# Patient Record
Sex: FEMALE | Race: ASIAN | Employment: FULL TIME | URBAN - METROPOLITAN AREA
[De-identification: names, ages, dates, MRNs, and addresses within clinical notes are randomized per-mention and may not be internally consistent; named-entity substitution may affect disease eponyms.]

---

## 2017-12-19 ENCOUNTER — ALLSCRIPTS OFFICE VISIT (OUTPATIENT)
Dept: OTHER | Facility: OTHER | Age: 22
End: 2017-12-19

## 2017-12-20 NOTE — PROGRESS NOTES
Assessment  1  Right ovarian cyst (620 2) (N83 201)   2  Acute pelvic pain, female (625 9) (R10 2)    Plan  Acute pelvic pain, female, Right ovarian cyst    · Follow-up visit in 2 weeks Evaluation and Treatment  Follow-up  Status: Hold For -Scheduling  Requested for: [id number]   Ordered; For: Acute pelvic pain, female, Right ovarian cyst; Ordered By: Milton Cabrales Performed:  Due: [id number]    Discussion/Summary  Discussion Summary:   Await results from [facility] and will call patient with recommendations  Goals and Barriers: The patient has the current Goals: Resolution of pain  The patent has the current Barriers: None  Patient's Capacity to Self-Care: Patient is able to Self-Care  Chief Complaint  Chief Complaint Free Text Note Form: I am following up from the hospital      History of Present Illness  HPI: Pt is a 25 y o G0 with LMP 12/18/2017 who presents for follow up to the ED  SHe reports she went to the ED On 12/14/2017 for right sided pain and she was told she had an ovarian cyst  She reports she was told it was large, but does not know the size  She was given tramadol for pain nd naproxen, but she only is taking naproxen  In the ED her pain was 8/10 and now it is 4/10  She denies urinary complaints  SHe denies abnormal bleeding and reports regular menses  She reports she is sexually active with her   She has been  x 2 years  She denies abnormal vaginal discharge  She does not bring any medical records with her  Review of Systems  Focused-Female:  Constitutional: No fever, no chills, feels well, no tiredness, no recent weight gain or loss  ENT: no ear ache, no loss of hearing, no nosebleeds or nasal discharge, no sore throat or hoarseness  Cardiovascular: no complaints of slow or fast heart rate, no chest pain, no palpitations, no leg claudication or lower extremity edema  Respiratory: no complaints of shortness of breath, no wheezing, no dyspnea on exertion, no orthopnea or PND  Breasts: no complaints of breast pain, breast lump or nipple discharge  Gastrointestinal: no complaints of abdominal pain, no constipation, no nausea or diarrhea, no vomiting, no bloody stools  Genitourinary: as noted in HPI  Musculoskeletal: no complaints of arthralgia, no myalgia, no joint swelling or stiffness, no limb pain or swelling  Integumentary: no complaints of skin rash or lesion, no itching or dry skin, no skin wounds  Neurological: no complaints of headache, no confusion, no numbness or tingling, no dizziness or fainting  Active Problems  1  Acute pelvic pain, female (625 9) (R10 2)   2  Right ovarian cyst (620 2) (N83 201)    Past Medical History  1  History of chickenpox vaccination (V49 89) (Z92 29)   2  Denied: History of herpes simplex infection   3  Denied: History of pregnancy   4  History of sexually transmitted disease (V12 09) (Z86 19)   5  History of Menarche (V21 8)    Surgical History  1  History of Nasal Septal Deviation Repair    Family History  Mother    1  Family history of hypercholesterolemia (V18 19) (Z83 42)   2  Family history of ischemic heart disease (V17 3) (Z82 49)  Father    3  Family history of    4  Family history of ischemic heart disease (V17 3) (Z82 49)  Family History    5  Denied: Family history of breast cancer   6  Denied: Family history of colon cancer   7  Denied: FHx: ovarian cancer    Social History   · College student   · [employer] [employer] [place]   · Denied: History of drug use   · 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)   · Housewife or homemaker   ·    · Never a smoker   · Occasional alcohol use   · Sedentary lifestyle (V15 89) (Z91 89)    Current Meds   1  Naproxen 500 MG Oral Tablet; Therapy: (Recorded:[id number]) to Recorded    Allergies  1   No Known Drug Allergies    Vitals  Vital Signs    Recorded: 07CRA1745 19:58SC   Systolic 824   Diastolic 70   Height 5 ft 5 75 in   Weight 196 lb 0 4 oz   BMI Calculated 31 88   BSA Calculated 1 98 LMP [id number]     Physical Exam   Constitutional  General appearance: Abnormal   obese  Neck  Neck: Normal, supple, trachea midline, no masses  Pulmonary  Respiratory effort: No increased work of breathing or signs of respiratory distress  Auscultation of lungs: Clear to auscultation  Cardiovascular  Auscultation of heart: Normal rate and rhythm, normal S1 and S2, no murmurs  Peripheral vascular exam: Normal pulses Throughout  Genitourinary  External genitalia: Normal and no lesions appreciated  Vagina: Normal, no lesions or dryness appreciated  Urethra: Normal    Urethral meatus: Normal    Bladder: Normal, soft, non-tender and no prolapse or masses appreciated  Cervix: Normal, no palpable masses  Examination of the cervix revealed normal findings,-- a nulliparous cervix,-- no polyps-- and-- no masses  Bimanual exam findings include no cervical motion tenderness  Uterus: Normal, non-tender, not enlarged, and no palpable masses  Adnexa/parametria: Abnormal   Adnexa Findings: tenderness on the right-- and-- a 5-6 cm right mass, but-- normal right adnexa,-- normal left adnexa,-- nontender on the left-- and-- no masses on the left  Abdomen  Abdomen: Normal, non-tender, and no organomegaly noted  Liver and spleen: No hepatomegaly or splenomegaly  Examination for hernias: No hernias appreciated  Skin  Skin and subcutaneous tissue: Normal skin turgor and no rashes     Psychiatric  Orientation to person, place, and time: Normal    Mood and affect: Normal        Signatures   Electronically signed by : TORI Martinez ; Dec 19 2017 11:59AM EST                       (Author)

## 2018-01-22 ENCOUNTER — ALLSCRIPTS OFFICE VISIT (OUTPATIENT)
Dept: OTHER | Facility: OTHER | Age: 23
End: 2018-01-22

## 2018-01-23 VITALS
SYSTOLIC BLOOD PRESSURE: 120 MMHG | DIASTOLIC BLOOD PRESSURE: 70 MMHG | BODY MASS INDEX: 31.51 KG/M2 | WEIGHT: 196.03 LBS | HEIGHT: 66 IN

## 2018-01-23 NOTE — PROGRESS NOTES
Assessment   1  Right ovarian cyst (620 2) (N83 201)    Plan   Right ovarian cyst    · * US PELVIS COMPLETE (TRANSABDOMINAL AND TRANSVAGINAL); Status:Hold For -    Scheduling; Requested for:[id number]; Perform:[facility]; Order Comments:previous right ovarian cyst 12/15/2017 9 9 x 7 7 x 5 5 cmplease re-evaluate; XZX:[id number];DXVIGBP; For:Right ovarian cyst; Ordered By:Christy Valera;    Discussion/Summary   Goals and Barriers: The patient has the current Goals: As per HPI  The patent has the current Barriers: None  Patient's Capacity to Self-Care: Patient is able to Self-Care  Chief Complaint   Chief Complaint Free Text Note Form: discuss cyst management      History of Present Illness   HPI: Pt is a 25 y o  G0 with LMP 1/12/2018 who presents to discuss management of her ovarian cyst  I reviewed with the patient that I received her records from [facility] and as we had discussed on the phone her right ovarian cyst measures 9 5 x 7 7 x 5 5 cm  At that time we reviewed the risk of ovarian torsion and I recommended surgical management  The pt reports she has discussed things with her family and  and she would like more information about alternative options  SHe reports she has minimal pain as compared to prior  She reports sexual activity is not painful at all  She also reports she is a full time student and does not want to miss school at this time  We reviewed the technique of laparoscopy ovarian cystectomy and the risks of bleeding, infection, damage to adjacent organs, possible loss of the ovary, dvt/pe and death  We also reviewed the risks of ovarian torsion and need for emergent surgery with possible loss of ovary  The pt would like to be as conservative as possible  She reports she understands the risks and would like to repeat the ultrasound  Pt reports that if the ultrasound shows the cyst is similar in size she will undergo the surgery during spring break   iF it is smaller she would prefer conservative management  Review of Systems   Focused-Female:      Constitutional: No fever, no chills, feels well, no tiredness, no recent weight gain or loss  ENT: no ear ache, no loss of hearing, no nosebleeds or nasal discharge, no sore throat or hoarseness  Cardiovascular: no complaints of slow or fast heart rate, no chest pain, no palpitations, no leg claudication or lower extremity edema  Respiratory: no complaints of shortness of breath, no wheezing, no dyspnea on exertion, no orthopnea or PND  Breasts: no complaints of breast pain, breast lump or nipple discharge  Gastrointestinal: no complaints of abdominal pain, no constipation, no nausea or diarrhea, no vomiting, no bloody stools  Genitourinary: as noted in HPI  Musculoskeletal: no complaints of arthralgia, no myalgia, no joint swelling or stiffness, no limb pain or swelling  Integumentary: no complaints of skin rash or lesion, no itching or dry skin, no skin wounds  Neurological: no complaints of headache, no confusion, no numbness or tingling, no dizziness or fainting  Active Problems   1  Acute pelvic pain, female (625 9) (R10 2)   2  Right ovarian cyst (620 2) (N83 201)    Past Medical History   1  History of chickenpox vaccination (V49 89) (Z92 29)   2  Denied: History of herpes simplex infection   3  Denied: History of pregnancy   4  History of sexually transmitted disease (V12 09) (Z86 19)   5  History of Menarche (V21 8)  Active Problems And Past Medical History Reviewed: The active problems and past medical history were reviewed and updated today  Surgical History   1  History of Nasal Septal Deviation Repair  Surgical History Reviewed: The surgical history was reviewed and updated today  Family History   Mother    1  Family history of hypercholesterolemia (V18 19) (Z83 42)   2  Family history of ischemic heart disease (V17 3) (Z82 49)  Father    3   Family history of    4  Family history of ischemic heart disease (V17 3) (Z82 49)  Family History    5  Denied: Family history of breast cancer   6  Denied: Family history of colon cancer   7  Denied: FHx: ovarian cancer  Family History Reviewed: The family history was reviewed and updated today  Social History    · College student   · [employer] [employer] [place]   · Denied: History of drug use   · 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)   · Housewife or homemaker   ·    · Never a smoker   · Occasional alcohol use   · Sedentary lifestyle (V15 89) (Z91 89)  Social History Reviewed: The social history was reviewed and updated today  The social history was reviewed and is unchanged  Current Meds    1  Naproxen 500 MG Oral Tablet; Therapy: (Recorded:01Vhh9723) to Recorded  Medication List Reviewed: The medication list was reviewed and updated today  Allergies   1  No Known Drug Allergies    Vitals   Vital Signs    Recorded: 05LTY8942 45:92MA   Systolic 283, LUE, Sitting   Diastolic 70, LUE, Sitting   Height 5 ft 5 75 in   Weight 197 lb    BMI Calculated 32 04   BSA Calculated 1 98   LMP [id number]     Physical Exam        Constitutional      General appearance: Abnormal   obese  Neck      Neck: Normal, supple, trachea midline, no masses  Pulmonary      Respiratory effort: No increased work of breathing or signs of respiratory distress  Cardiovascular      Auscultation of heart: Normal rate and rhythm, normal S1 and S2, no murmurs  Genitourinary      External genitalia: Normal and no lesions appreciated  Vagina: Normal, no lesions or dryness appreciated  Urethra: Normal        Urethral meatus: Normal        Bladder: Normal, soft, non-tender and no prolapse or masses appreciated  Cervix: Normal, no palpable masses  Examination of the cervix revealed normal findings,-- a nulliparous cervix,-- no polyps-- and-- no masses   Bimanual exam findings include no cervical motion tenderness  Uterus: Normal, non-tender, not enlarged, and no palpable masses  Adnexa/parametria: Abnormal   Adnexa Findings: a 3-4 cm right mass, but-- normal right adnexa,-- normal left adnexa,-- nontender on the right,-- nontender on the left-- and-- no masses on the left  Abdomen      Abdomen: Normal, non-tender, and no organomegaly noted  Liver and spleen: No hepatomegaly or splenomegaly  Examination for hernias: No hernias appreciated  Skin      Skin and subcutaneous tissue: Normal skin turgor and no rashes         Psychiatric      Orientation to person, place, and time: Normal        Mood and affect: Normal        Signatures    Electronically signed by : TORI Araujo ; Jan 22 2018  4:22PM EST                       (Author)

## 2018-02-02 DIAGNOSIS — N83.201 CYST OF RIGHT OVARY: ICD-10-CM

## 2018-02-03 ENCOUNTER — HOSPITAL ENCOUNTER (OUTPATIENT)
Dept: ULTRASOUND IMAGING | Facility: HOSPITAL | Age: 23
Discharge: HOME | End: 2018-02-03
Attending: OBSTETRICS & GYNECOLOGY
Payer: COMMERCIAL

## 2018-02-03 DIAGNOSIS — N83.201 CYST OF RIGHT OVARY: ICD-10-CM

## 2018-02-03 PROCEDURE — 76830 TRANSVAGINAL US NON-OB: CPT

## 2018-02-03 PROCEDURE — 76856 US EXAM PELVIC COMPLETE: CPT

## 2018-02-13 ENCOUNTER — TELEPHONE (OUTPATIENT)
Dept: OBGYN CLINIC | Facility: CLINIC | Age: 23
End: 2018-02-13

## 2018-02-13 ENCOUNTER — OFFICE VISIT (OUTPATIENT)
Dept: OBGYN CLINIC | Facility: CLINIC | Age: 23
End: 2018-02-13
Payer: COMMERCIAL

## 2018-02-13 VITALS
HEIGHT: 65 IN | DIASTOLIC BLOOD PRESSURE: 70 MMHG | SYSTOLIC BLOOD PRESSURE: 102 MMHG | BODY MASS INDEX: 32.15 KG/M2 | WEIGHT: 193 LBS

## 2018-02-13 DIAGNOSIS — N83.201 RIGHT OVARIAN CYST: Primary | ICD-10-CM

## 2018-02-13 PROCEDURE — 99214 OFFICE O/P EST MOD 30 MIN: CPT | Performed by: OBSTETRICS & GYNECOLOGY

## 2018-02-13 RX ORDER — SODIUM CHLORIDE 9 MG/ML
125 INJECTION, SOLUTION INTRAVENOUS CONTINUOUS
Status: CANCELLED | OUTPATIENT
Start: 2018-02-13

## 2018-02-14 NOTE — PATIENT INSTRUCTIONS
Dr Emerita Wang surgery  [facility]                                                         Diagnostic surgery  [address]                                                                                           Tubal ligation  [place], [place]  [address]                                                                        Salpingectomy  [phone]                                                    Post-operative Instructions---Laparoscopic Surgery      Things to call for:         temperature of 100 4*F or more                                      worsening pain                                       foul smelling discharge                                      heavy vaginal bleeding                                      persistent nausea or vomiting                                      redness, tenderness, discharge at the incision(s)    You may experience: vaginal spotting for several days                                     mild nausea related to the anesthetic and which should steadily                                            improve                                     abdominal bloating for about two weeks                                     moderate pain for the first day or two followed by steady                                                        improvement    You may:                   return to work in 1-7 days (depending on the nature of your work                                         and the procedure performed)                                    shower the day following the procedure                                    eat as you normally would                                                                    do light housework (daily household requirements but please                                                 let the vacuuming wait at least two weeks) resume usual activities in two weeks                                    please avoid significant straining for at least two weeks      Please do not:           have sexual relations for at least four weeks                                      place anything in the vagina until two weeks after the procedure                                        lift or push heavy items (> 20 lbs ) until four weeks after the                                                   procedure

## 2018-02-14 NOTE — PROGRESS NOTES
HPI:  Pt is a 25 y o  Rayshawn Emeli with Patient's last menstrual period was 2018 (exact date)  using NFP for contraception presents c/o ovarian cyst, enlarging and persisting  The patient reports that after discussion with her  after we reviewed results of her last ultrasound, that even though her pelvic pain has resolved, she has decided to proceed with surgical management of her right ovarian cyst      PMHx:   Past Medical History:   Diagnosis Date    Chicken pox        PSHx:   Past Surgical History:   Procedure Laterality Date    NOSE SURGERY      Nasal Septum deviation repair       Meds:   (Not in a hospital admission)    Allergies: No Known Allergies    Social Hx:    Social History     Social History    Marital status: /Civil Union     Spouse name: N/A    Number of children: N/A    Years of education: college student     Occupational History    student      Social History Main Topics    Smoking status: Never Smoker    Smokeless tobacco: Never Used    Alcohol use Yes      Comment: occasional    Drug use: No    Sexual activity: Yes     Partners: Male     Birth control/ protection: Rhythm     Other Topics Concern    None     Social History Narrative    College student    [employer] [place]    Hookah pipe smoker    [place] lifestyle       Ob Hx:   OB History    Para Term  AB Living   0 0 0 0 0 0   SAB TAB Ectopic Multiple Live Births   0 0 0 0 0             Gyn HX:  denies STD, abnormal pap, significant dysmenorrhea or irregular menses    Fm Hx:   Family History   Problem Relation Age of Onset    Hyperlipidemia Mother     Heart disease Mother     Heart attack Father 36    Heart disease Father     Gopi's disease Maternal Grandmother     Breast cancer Neg Hx     Ovarian cancer Neg Hx     Colon cancer Neg Hx        ROS:  Review of Systems   Constitutional: Negative for chills, fatigue, fever and unexpected weight change     HENT: Negative for congestion, mouth sores and sore throat  Respiratory: Negative for cough, chest tightness, shortness of breath and wheezing  Cardiovascular: Negative for chest pain and palpitations  Gastrointestinal: Negative for abdominal distention, abdominal pain, constipation, diarrhea, nausea and vomiting  Endocrine: Negative for cold intolerance and heat intolerance  Genitourinary: Negative for dyspareunia, dysuria, genital sores, menstrual problem, pelvic pain, vaginal bleeding, vaginal discharge and vaginal pain  Musculoskeletal: Negative for arthralgias  Skin: Negative for color change and rash  Neurological: Negative for dizziness, light-headedness and headaches  Hematological: Negative for adenopathy  VS:  /70 (BP Location: Left arm, Patient Position: Sitting, Cuff Size: Standard)   Ht 5' 5 24" (1 657 m)   Wt 87 5 kg (193 lb)   LMP 02/09/2018 (Exact Date)   Breastfeeding? No   BMI 31 88 kg/m²       Exam:    Physical Exam   Constitutional: She is oriented to person, place, and time  She appears well-developed and well-nourished  HENT:   Head: Normocephalic and atraumatic  Eyes: Conjunctivae and EOM are normal    Neck: Normal range of motion  Cardiovascular: Normal rate, regular rhythm and normal heart sounds  Pulmonary/Chest: Effort normal and breath sounds normal    Abdominal: Soft  Bowel sounds are normal  She exhibits no distension  There is no tenderness  There is no rebound and no guarding  Musculoskeletal: Normal range of motion  Neurological: She is alert and oriented to person, place, and time  Skin: Skin is warm  Psychiatric: She has a normal mood and affect   Her behavior is normal  Judgment and thought content normal        abd        soft, NT, ND, no palpable masses or organomegally           vulva      normal external genitalia for age and no lesions, masses, epithelial changes, or exudate    vagina    color pink, rugae  well formed rugae and discharge  absent    cervix nullip, no lesions  and no cervical motion tenderness    uterus     NSSC, AF, NT, mobile and 6    adnexa   Right ovary with 4 cm mass palpated, nontender, mobile  left ovary normal size, free of cysts or masses  RV        deferred    Labs:  No results found for: WBC, HGB, HCT, PLT  No results found for: PREGTESTUR, PREGSERUM, HCG, HCGQUANT    Imagin/3/2018  UTERUS:  The uterus is normal in position, measuring 6 9 x 3 6 x 6 2 cm  Contour and echotexture appear normal   The cervix shows no suspicious abnormality      ENDOMETRIUM:    Normal caliber of 7 mm  Homogenous and normal in appearance      OVARIES/ADNEXA:  Right ovary:  10 8 x 6 6 x 6 9 cm  The right ovary is occupied by a large cyst   There are no septations  A small soft tissue nodule arises from the right, posterior wall of this cyst, this nodule measures approximately 8 mm in diameter  Doppler flow within normal limits      Left ovary:  3 6 x 2 1 x 2 6 cm  No suspicious left ovarian abnormality  Doppler flow within normal limits      No suspicious adnexal mass or loculated collections  There is trace free fluid      IMPRESSION:     The right ovary is replaced by a large primarily simple cyst which contains a small wall nodule  Surgical consultation recommended  A/P: 25 y o  Toney Hamilton with Patient's last menstrual period was 2018 (exact date)  with ovarian cyst, enlarging and persisting  for laparoscopic right ovarian cystetomy, possible right oophorectomy   The risks (infection, bleeding, transfusion, damage to adjacent organs requiring immediate and/or delayed repair, clots inside blood vessels, need to complete procedure using alternative approach, procedural failure, worsening of pre-exisiting medical condition, and death) and alternatives (see outpatient record) have been discussed and patient desires to proceed with laparoscopic right ovarian cystectomy, possible right oophrectomy at [facility] on 3/1/2018

## 2018-02-28 ENCOUNTER — ANESTHESIA EVENT (OUTPATIENT)
Dept: PERIOP | Facility: HOSPITAL | Age: 23
End: 2018-02-28
Payer: COMMERCIAL

## 2018-03-01 ENCOUNTER — ANESTHESIA (OUTPATIENT)
Dept: PERIOP | Facility: HOSPITAL | Age: 23
End: 2018-03-01
Payer: COMMERCIAL

## 2018-03-01 ENCOUNTER — HOSPITAL ENCOUNTER (OUTPATIENT)
Facility: HOSPITAL | Age: 23
Setting detail: OUTPATIENT SURGERY
Discharge: HOME | End: 2018-03-01
Attending: OBSTETRICS & GYNECOLOGY | Admitting: OBSTETRICS & GYNECOLOGY
Payer: COMMERCIAL

## 2018-03-01 VITALS
OXYGEN SATURATION: 98 % | HEIGHT: 67 IN | RESPIRATION RATE: 20 BRPM | WEIGHT: 192.9 LBS | SYSTOLIC BLOOD PRESSURE: 116 MMHG | DIASTOLIC BLOOD PRESSURE: 62 MMHG | HEART RATE: 84 BPM | BODY MASS INDEX: 30.28 KG/M2 | TEMPERATURE: 98.5 F

## 2018-03-01 DIAGNOSIS — N83.201 RIGHT OVARIAN CYST: ICD-10-CM

## 2018-03-01 DIAGNOSIS — G89.18 POST-OPERATIVE PAIN: Primary | ICD-10-CM

## 2018-03-01 LAB — EXT PREGNANCY TEST URINE: NEGATIVE

## 2018-03-01 PROCEDURE — 88112 CYTOPATH CELL ENHANCE TECH: CPT | Performed by: OBSTETRICS & GYNECOLOGY

## 2018-03-01 PROCEDURE — 58662 LAPAROSCOPY EXCISE LESIONS: CPT | Performed by: OBSTETRICS & GYNECOLOGY

## 2018-03-01 PROCEDURE — 88305 TISSUE EXAM BY PATHOLOGIST: CPT | Performed by: PATHOLOGY

## 2018-03-01 PROCEDURE — C1765 ADHESION BARRIER: HCPCS | Performed by: OBSTETRICS & GYNECOLOGY

## 2018-03-01 PROCEDURE — 88305 TISSUE EXAM BY PATHOLOGIST: CPT | Performed by: OBSTETRICS & GYNECOLOGY

## 2018-03-01 PROCEDURE — 81025 URINE PREGNANCY TEST: CPT | Performed by: OBSTETRICS & GYNECOLOGY

## 2018-03-01 PROCEDURE — 88304 TISSUE EXAM BY PATHOLOGIST: CPT | Performed by: PATHOLOGY

## 2018-03-01 PROCEDURE — 88112 CYTOPATH CELL ENHANCE TECH: CPT | Performed by: PATHOLOGY

## 2018-03-01 PROCEDURE — 88304 TISSUE EXAM BY PATHOLOGIST: CPT | Performed by: OBSTETRICS & GYNECOLOGY

## 2018-03-01 RX ORDER — ONDANSETRON 2 MG/ML
INJECTION INTRAMUSCULAR; INTRAVENOUS AS NEEDED
Status: DISCONTINUED | OUTPATIENT
Start: 2018-03-01 | End: 2018-03-01 | Stop reason: SURG

## 2018-03-01 RX ORDER — OXYCODONE HYDROCHLORIDE 5 MG/1
10 TABLET ORAL EVERY 4 HOURS PRN
Status: DISCONTINUED | OUTPATIENT
Start: 2018-03-01 | End: 2018-03-02 | Stop reason: HOSPADM

## 2018-03-01 RX ORDER — PROPOFOL 10 MG/ML
INJECTION, EMULSION INTRAVENOUS AS NEEDED
Status: DISCONTINUED | OUTPATIENT
Start: 2018-03-01 | End: 2018-03-01 | Stop reason: SURG

## 2018-03-01 RX ORDER — ONDANSETRON 2 MG/ML
4 INJECTION INTRAMUSCULAR; INTRAVENOUS ONCE AS NEEDED
Status: DISCONTINUED | OUTPATIENT
Start: 2018-03-01 | End: 2018-03-01 | Stop reason: HOSPADM

## 2018-03-01 RX ORDER — MIDAZOLAM HYDROCHLORIDE 1 MG/ML
INJECTION INTRAMUSCULAR; INTRAVENOUS AS NEEDED
Status: DISCONTINUED | OUTPATIENT
Start: 2018-03-01 | End: 2018-03-01 | Stop reason: SURG

## 2018-03-01 RX ORDER — BUPIVACAINE HYDROCHLORIDE 2.5 MG/ML
INJECTION, SOLUTION INFILTRATION; PERINEURAL AS NEEDED
Status: DISCONTINUED | OUTPATIENT
Start: 2018-03-01 | End: 2018-03-01 | Stop reason: HOSPADM

## 2018-03-01 RX ORDER — MAGNESIUM HYDROXIDE 1200 MG/15ML
LIQUID ORAL AS NEEDED
Status: DISCONTINUED | OUTPATIENT
Start: 2018-03-01 | End: 2018-03-01 | Stop reason: HOSPADM

## 2018-03-01 RX ORDER — KETOROLAC TROMETHAMINE 30 MG/ML
INJECTION, SOLUTION INTRAMUSCULAR; INTRAVENOUS AS NEEDED
Status: DISCONTINUED | OUTPATIENT
Start: 2018-03-01 | End: 2018-03-01 | Stop reason: SURG

## 2018-03-01 RX ORDER — OXYCODONE HYDROCHLORIDE AND ACETAMINOPHEN 5; 325 MG/1; MG/1
1 TABLET ORAL EVERY 4 HOURS PRN
Status: DISCONTINUED | OUTPATIENT
Start: 2018-03-01 | End: 2018-03-02 | Stop reason: HOSPADM

## 2018-03-01 RX ORDER — IBUPROFEN 600 MG/1
600 TABLET ORAL EVERY 6 HOURS PRN
Status: DISCONTINUED | OUTPATIENT
Start: 2018-03-01 | End: 2018-03-02 | Stop reason: HOSPADM

## 2018-03-01 RX ORDER — ROCURONIUM BROMIDE 10 MG/ML
INJECTION, SOLUTION INTRAVENOUS AS NEEDED
Status: DISCONTINUED | OUTPATIENT
Start: 2018-03-01 | End: 2018-03-01 | Stop reason: SURG

## 2018-03-01 RX ORDER — FENTANYL CITRATE/PF 50 MCG/ML
25 SYRINGE (ML) INJECTION
Status: DISCONTINUED | OUTPATIENT
Start: 2018-03-01 | End: 2018-03-01 | Stop reason: HOSPADM

## 2018-03-01 RX ORDER — IBUPROFEN 600 MG/1
600 TABLET ORAL EVERY 6 HOURS PRN
Qty: 30 TABLET | Refills: 0 | Status: SHIPPED | OUTPATIENT
Start: 2018-03-01 | End: 2018-03-08

## 2018-03-01 RX ORDER — OXYCODONE HYDROCHLORIDE AND ACETAMINOPHEN 5; 325 MG/1; MG/1
1 TABLET ORAL EVERY 4 HOURS PRN
Qty: 15 TABLET | Refills: 0 | Status: SHIPPED | OUTPATIENT
Start: 2018-03-01 | End: 2018-03-08

## 2018-03-01 RX ORDER — SODIUM CHLORIDE 9 MG/ML
125 INJECTION, SOLUTION INTRAVENOUS CONTINUOUS
Status: DISCONTINUED | OUTPATIENT
Start: 2018-03-01 | End: 2018-03-02 | Stop reason: HOSPADM

## 2018-03-01 RX ORDER — ONDANSETRON 2 MG/ML
4 INJECTION INTRAMUSCULAR; INTRAVENOUS EVERY 6 HOURS PRN
Status: DISCONTINUED | OUTPATIENT
Start: 2018-03-01 | End: 2018-03-02 | Stop reason: HOSPADM

## 2018-03-01 RX ORDER — FENTANYL CITRATE 50 UG/ML
INJECTION, SOLUTION INTRAMUSCULAR; INTRAVENOUS AS NEEDED
Status: DISCONTINUED | OUTPATIENT
Start: 2018-03-01 | End: 2018-03-01 | Stop reason: SURG

## 2018-03-01 RX ORDER — GLYCOPYRROLATE 0.2 MG/ML
INJECTION INTRAMUSCULAR; INTRAVENOUS AS NEEDED
Status: DISCONTINUED | OUTPATIENT
Start: 2018-03-01 | End: 2018-03-01 | Stop reason: SURG

## 2018-03-01 RX ADMIN — ROCURONIUM BROMIDE 30 MG: 10 INJECTION INTRAVENOUS at 14:12

## 2018-03-01 RX ADMIN — DEXAMETHASONE SODIUM PHOSPHATE 4 MG: 10 INJECTION INTRAMUSCULAR; INTRAVENOUS at 14:34

## 2018-03-01 RX ADMIN — MIDAZOLAM HYDROCHLORIDE 2 MG: 1 INJECTION, SOLUTION INTRAMUSCULAR; INTRAVENOUS at 14:02

## 2018-03-01 RX ADMIN — FENTANYL CITRATE 100 MCG: 50 INJECTION, SOLUTION INTRAMUSCULAR; INTRAVENOUS at 14:12

## 2018-03-01 RX ADMIN — ROCURONIUM BROMIDE 10 MG: 10 INJECTION INTRAVENOUS at 14:45

## 2018-03-01 RX ADMIN — PROPOFOL 200 MG: 10 INJECTION, EMULSION INTRAVENOUS at 14:12

## 2018-03-01 RX ADMIN — IBUPROFEN 600 MG: 600 TABLET, FILM COATED ORAL at 18:45

## 2018-03-01 RX ADMIN — KETOROLAC TROMETHAMINE 30 MG: 30 INJECTION, SOLUTION INTRAMUSCULAR at 16:07

## 2018-03-01 RX ADMIN — GLYCOPYRROLATE 0.4 MG: 0.2 INJECTION, SOLUTION INTRAMUSCULAR; INTRAVENOUS at 16:27

## 2018-03-01 RX ADMIN — FENTANYL CITRATE 50 MCG: 50 INJECTION, SOLUTION INTRAMUSCULAR; INTRAVENOUS at 15:39

## 2018-03-01 RX ADMIN — ONDANSETRON HYDROCHLORIDE 4 MG: 2 INJECTION, SOLUTION INTRAVENOUS at 14:34

## 2018-03-01 RX ADMIN — NEOSTIGMINE METHYLSULFATE 2.5 MG: 1 INJECTION, SOLUTION INTRAMUSCULAR; INTRAVENOUS; SUBCUTANEOUS at 16:27

## 2018-03-01 RX ADMIN — ONDANSETRON HYDROCHLORIDE 4 MG: 2 INJECTION, SOLUTION INTRAVENOUS at 15:41

## 2018-03-01 RX ADMIN — SODIUM CHLORIDE 125 ML/HR: 0.9 INJECTION, SOLUTION INTRAVENOUS at 13:35

## 2018-03-01 RX ADMIN — FENTANYL CITRATE 50 MCG: 50 INJECTION, SOLUTION INTRAMUSCULAR; INTRAVENOUS at 14:54

## 2018-03-01 RX ADMIN — FENTANYL CITRATE 25 MCG: 50 INJECTION INTRAMUSCULAR; INTRAVENOUS at 17:07

## 2018-03-01 RX ADMIN — FENTANYL CITRATE 50 MCG: 50 INJECTION, SOLUTION INTRAMUSCULAR; INTRAVENOUS at 15:00

## 2018-03-01 RX ADMIN — FENTANYL CITRATE 50 MCG: 50 INJECTION, SOLUTION INTRAMUSCULAR; INTRAVENOUS at 16:25

## 2018-03-01 NOTE — ANESTHESIA POSTPROCEDURE EVALUATION
Post-Op Assessment Note      CV Status:  Stable    Mental Status:  Alert and awake    Hydration Status:  Euvolemic    PONV Controlled:  Controlled    Airway Patency:  Patent    Post Op Vitals Reviewed:  Yes              /54 (03/01/18 1710)    Temp      Pulse 86 (03/01/18 1710)   Resp 12 (03/01/18 1710)    SpO2 93 % (03/01/18 1715)

## 2018-03-01 NOTE — ANESTHESIA PREPROCEDURE EVALUATION
Review of Systems/Medical History  Patient summary reviewed        Cardiovascular  Negative cardio ROS    Pulmonary  Negative pulmonary ROS        GI/Hepatic  Negative GI/hepatic ROS          Negative  ROS        Endo/Other  Negative endo/other ROS      GYN  Negative gynecology ROS          Hematology  Negative hematology ROS      Musculoskeletal  Negative musculoskeletal ROS        Neurology    Headaches,    Psychology   Negative psychology ROS              Physical Exam    Airway    Mallampati score: I  TM Distance: >3 FB  Neck ROM: full     Dental   No notable dental hx     Cardiovascular  Comment: Negative ROS, Rhythm: regular, Rate: normal, Cardiovascular exam normal    Pulmonary  Pulmonary exam normal Breath sounds clear to auscultation,     Other Findings        Anesthesia Plan  ASA Score- 2     Anesthesia Type- general with ASA Monitors  Additional Monitors:   Airway Plan: ETT  Plan Factors-Patient not instructed to abstain from smoking on day of procedure  Patient did not smoke on day of surgery  Induction-     Postoperative Plan-     Informed Consent- Anesthetic plan and risks discussed with patient and spouse

## 2018-03-01 NOTE — OP NOTE
OPERATIVE REPORT  PATIENT NAME: Ester Maldonado    :  1995  MRN: [id number]  Pt Location: [facility] OR ROOM 02    SURGERY DATE: 3/1/2018    Surgeon(s) and Role:     * Justino Landon MD - Primary     * Marcella Mcleod - Assisting    Preop Diagnosis:  Right ovarian cyst [N83 201]    Post-Op Diagnosis Codes:  Right fallopian tube cyst    Procedure(s) (LRB):  LAPAROSCOPIC RIGHT SALPINGOCYSTECTOMY (Right)    Specimen(s):  ID Type Source Tests Collected by Time Destination   1 : right fallopian tube cyst wall Tissue Fallopian Tube, Right TISSUE EXAM Justino Landon MD 3/1/2018 1558    2 : right fallopian cyst fluid Other Cyst NON-GYNECOLOGIC CYTOLOGY Justino Landon MD 3/1/2018 1600        Estimated Blood Loss:   25 mL    Drains: None    Anesthesia Type:   General with endotracheal tube    Operative Indications:  Right ovarian cyst [N83 201]    IV Fluids: 2000 mL normal saline    Supplemental Intraoperative Analgesia: IV toradol 30 mg    Diagnostic Laparoscopy Findings:   Right fallopian tube cyst - aspirated for 280 mL of straw colored clear fluid, sent for cytology   Elongated appearance to the ovaries bilaterally   Two small 1 cm corpus luteum cysts in the left ovary   Normal appearing left fallopian tube bilaterally   Normal appearing uterus;    Normal appearing vermiform appendix   No evidence of endometriosis   No evidence of intraperitoneal hemorrhage upon initial entry    Description of Procedure:    Patient was taken to the operating room were a time out was performed to confirm correct patient and correct procedure  General endotracheal anesthesia was administered and the patient was positioned on the OR table in the dorsal lithotomy position  All pressure points were padded and a maida hugger was placed to maintain control of core body temperature  Arms were tucked  The patient was prepped and draped in the usual sterile fashion       A Lacey catheter was introduced into the bladder, which started draining clear yellow urine  A weighted speculum was inserted into the vagina and a Bahman retractor was used to visualize the anterior lip of the cervix, which was then grasped with a single toothed tenaculum  A Cifuentes uterine manipulator was inserted into the cervix and secured to the tenaculum  The speculum was removed from the vagina  Sterile gloves were then exchanged and attention was turned to the abdomen  After injecting 2 mL of bupivacaine 0 25% subcutaneously at the inferior edge of the umbilicus, a 5 mm incision was made for introduction of a 5 mm trocar  Trocar was introduced under direct visualization  Pneumoperitoneum was then established to a maximum of 15 mmHg  The entire abdomen and pelvis were surveyed with the above-noted findings  There was no evidence of injury to bowel, bladder, vasculature, or other structures  Attention was then turned to the pelvis  The patient was placed in Trendelenburg position  A second port site was selected 3 cm cephalad and 3 cm medial to the right anterior superior iliac spine  After injecting 2 mL of bupivacaine 0 25% subcutaneously and transillumination of the skin via the laparoscopic light source, a 5 mm incision was made for introduction of a 5 mm trocar under direct visualization  A third port site was selected 3 cm cephalad and 3 cm medial to the left anterior superior iliac spine  After injecting 2 mL of bupivacaine 0 25% subcutaneously and transillumination of the skin via the laparoscopic light source, a 11 mm incision was made for introduction of a 11 mm trocar under direct visualization  A single, intact, right tubal cyst was visualized  Cystotomy with evacuation of the cyst fluid was performed with a sharp laparoscopic aspiration needle attached to a 60 mL syringe  A [place] grasper was used to elevate the cyst  Laparoscopic sharp scissors with bipolar electrocautery were used to incise the cyst margin   Further aspiration was performed using the laparoscopic blunt aspirator tip  Traction-countertraction using Maryland graspers was used to extensively seperate the incised cyst wall from the salpinx parenchyma  The cyst wall was also rotated to facilitate its separation from the salpinx parenchyma  The cyst wall was resected  The endopouch bag was inserted via the left port and deployed to capture the cyst wall  The trocar was removed and the endopouch bag retrieved with the resected cyst wall inside  Copious irrigation was performed on the resected cyst bed  Hemostasis was noted  The left trocar was removed  The 11 mm fascial defect was grasped on either side with Klaudia clamps, tented up, and closed with a single interrupted vicryl suture on a UR-6 needle  The right port was removed under direct visualization and the port site was noted to be hemostatic  The laparoscope was withdrawn from the abdomen, followed by its trocar sleeve at the umbilicus  Skin incisions were closed with 4-0 monocryl suture  The single toothed tenaculum and uterine manipulator were removed from the anterior lip of the cervix  At the conclusion of the procedure, all needle, sponge, and instrument counts were noted to be correct x2  Patient tolerated the procedure well and was transferred to PACU in stable condition prior to discharge with follow up in 1-2 weeks  Dr Ale Washington was present and participated in all key portions of the case  The patient's prescription narcotic record was reviewed in the [address] [place] [address] prior to prescribing her opioids for post-operative pain control  Complications:   None    Procedure and Technique:       I was present for the entire procedure    Patient Disposition:  PACU     SIGNATURE: Anthony Gonzalez  DATE: March 1, 2018  TIME: 4:52 PM    I agree with the operative report as dictated by Dr Luke Marc and edited by me  I was present for the entire procedure      Priscilla Moore MD

## 2018-03-01 NOTE — H&P (VIEW-ONLY)
HPI:  Pt is a 25 y o  Paulino Hackett with Patient's last menstrual period was 2018 (exact date)  using NFP for contraception presents c/o ovarian cyst, enlarging and persisting  The patient reports that after discussion with her  after we reviewed results of her last ultrasound, that even though her pelvic pain has resolved, she has decided to proceed with surgical management of her right ovarian cyst      PMHx:   Past Medical History:   Diagnosis Date    Chicken pox        PSHx:   Past Surgical History:   Procedure Laterality Date    NOSE SURGERY      Nasal Septum deviation repair       Meds:   (Not in a hospital admission)    Allergies: No Known Allergies    Social Hx:    Social History     Social History    Marital status: /Civil Union     Spouse name: N/A    Number of children: N/A    Years of education: college student     Occupational History    student      Social History Main Topics    Smoking status: Never Smoker    Smokeless tobacco: Never Used    Alcohol use Yes      Comment: occasional    Drug use: No    Sexual activity: Yes     Partners: Male     Birth control/ protection: Rhythm     Other Topics Concern    None     Social History Narrative    College student    [employer]    Hookah pipe smoker    [place] lifestyle       Ob Hx:   OB History    Para Term  AB Living   0 0 0 0 0 0   SAB TAB Ectopic Multiple Live Births   0 0 0 0 0             Gyn HX:  denies STD, abnormal pap, significant dysmenorrhea or irregular menses    Fm Hx:   Family History   Problem Relation Age of Onset    Hyperlipidemia Mother     Heart disease Mother     Heart attack Father 36    Heart disease Father     Mitchell's disease Maternal Grandmother     Breast cancer Neg Hx     Ovarian cancer Neg Hx     Colon cancer Neg Hx        ROS:  Review of Systems   Constitutional: Negative for chills, fatigue, fever and unexpected weight change     HENT: Negative for congestion, mouth sores and sore throat  Respiratory: Negative for cough, chest tightness, shortness of breath and wheezing  Cardiovascular: Negative for chest pain and palpitations  Gastrointestinal: Negative for abdominal distention, abdominal pain, constipation, diarrhea, nausea and vomiting  Endocrine: Negative for cold intolerance and heat intolerance  Genitourinary: Negative for dyspareunia, dysuria, genital sores, menstrual problem, pelvic pain, vaginal bleeding, vaginal discharge and vaginal pain  Musculoskeletal: Negative for arthralgias  Skin: Negative for color change and rash  Neurological: Negative for dizziness, light-headedness and headaches  Hematological: Negative for adenopathy  VS:  /70 (BP Location: Left arm, Patient Position: Sitting, Cuff Size: Standard)   Ht 5' 5 24" (1 657 m)   Wt 87 5 kg (193 lb)   LMP 02/09/2018 (Exact Date)   Breastfeeding? No   BMI 31 88 kg/m²       Exam:    Physical Exam   Constitutional: She is oriented to person, place, and time  She appears well-developed and well-nourished  HENT:   Head: Normocephalic and atraumatic  Eyes: Conjunctivae and EOM are normal    Neck: Normal range of motion  Cardiovascular: Normal rate, regular rhythm and normal heart sounds  Pulmonary/Chest: Effort normal and breath sounds normal    Abdominal: Soft  Bowel sounds are normal  She exhibits no distension  There is no tenderness  There is no rebound and no guarding  Musculoskeletal: Normal range of motion  Neurological: She is alert and oriented to person, place, and time  Skin: Skin is warm  Psychiatric: She has a normal mood and affect   Her behavior is normal  Judgment and thought content normal        abd        soft, NT, ND, no palpable masses or organomegally           vulva      normal external genitalia for age and no lesions, masses, epithelial changes, or exudate    vagina    color pink, rugae  well formed rugae and discharge  absent    cervix nullip, no lesions  and no cervical motion tenderness    uterus     NSSC, AF, NT, mobile and 6    adnexa   Right ovary with 4 cm mass palpated, nontender, mobile  left ovary normal size, free of cysts or masses  RV        deferred    Labs:  No results found for: WBC, HGB, HCT, PLT  No results found for: PREGTESTUR, PREGSERUM, HCG, HCGQUANT    Imagin/3/2018  UTERUS:  The uterus is normal in position, measuring 6 9 x 3 6 x 6 2 cm  Contour and echotexture appear normal   The cervix shows no suspicious abnormality      ENDOMETRIUM:    Normal caliber of 7 mm  Homogenous and normal in appearance      OVARIES/ADNEXA:  Right ovary:  10 8 x 6 6 x 6 9 cm  The right ovary is occupied by a large cyst   There are no septations  A small soft tissue nodule arises from the right, posterior wall of this cyst, this nodule measures approximately 8 mm in diameter  Doppler flow within normal limits      Left ovary:  3 6 x 2 1 x 2 6 cm  No suspicious left ovarian abnormality  Doppler flow within normal limits      No suspicious adnexal mass or loculated collections  There is trace free fluid      IMPRESSION:     The right ovary is replaced by a large primarily simple cyst which contains a small wall nodule  Surgical consultation recommended  A/P: 25 y o  [employer] with Patient's last menstrual period was 2018 (exact date)  with ovarian cyst, enlarging and persisting  for laparoscopic right ovarian cystetomy, possible right oophorectomy   The risks (infection, bleeding, transfusion, damage to adjacent organs requiring immediate and/or delayed repair, clots inside blood vessels, need to complete procedure using alternative approach, procedural failure, worsening of pre-exisiting medical condition, and death) and alternatives (see outpatient record) have been discussed and patient desires to proceed with laparoscopic right ovarian cystectomy, possible right oophrectomy at [facility] on 3/1/2018

## 2018-03-01 NOTE — DISCHARGE INSTRUCTIONS
Post-Gynecologic Surgery Discharge Instructions:  1  No heavy lifting more than one full gallon of milk (about 8 lbs) for 1 week  2  Nothing in the vagina for 6 weeks  3  You may take stairs one at a time, touching each step with both feet for the first few days, then as tolerated  4  Call the office for fever greater than 100  4'F, heavy vaginal bleeding, or increasing pain  5  Activity as tolerated  6  Avoid driving if taking narcotic pain medications (Percocet)  Post Operative Pain Management:  If you have cramping or mild pain you may take 600 mg Ibuprofen every 6 hours to relieve  If you continue to have residual mild pain not entirely relieved by Ibuprofen then you may take 650 mg of tylenol every 6 hours  If you have moderate pain then please take 1 tab of Percocet every 4 hours for relief  Do not combine with tylenol and please wait at least 4 hours after your last dose of Tylenol  If you have severe pain then please take 2 tabs of Percocet every 6 hours for relief  Do not combine with tylenol and please wait at least 4 hours after your last dose of Tylenol  If you have any questions regarding your prescriptions please call your doctor  Exploratory Laparoscopy   WHAT YOU NEED TO KNOW:   Exploratory laparoscopy is surgery to look for causes of pain, abnormal growths, bleeding, or disease in your abdomen  During this surgery, small incisions are made in your abdomen  A small scope and tools are inserted through these incisions  A scope is a flexible tube with a light and camera on the end  DISCHARGE INSTRUCTIONS:   Medicines:   · Antibiotics: This medicine is given to fight or prevent an infection caused by bacteria  · Pain medicine: You may be given a prescription medicine to decrease pain  Do not wait until the pain is severe before you take this medicine  · Take your medicine as directed    Contact your healthcare provider if you think your medicine is not helping or if you have side effects  Tell him or her if you are allergic to any medicine  Keep a list of the medicines, vitamins, and herbs you take  Include the amounts, and when and why you take them  Bring the list or the pill bottles to follow-up visits  Carry your medicine list with you in case of an emergency  Follow up with your healthcare provider or surgeon as directed: You may need to return to have your stitches removed  Write down your questions so you remember to ask them during your visits  Wound care:   · Follow your healthcare provider or surgeon's instructions: You may need to keep the bandages on your incisions for 1 to 2 days or until your follow-up visit  After your follow-up visit, you may need to change your bandage 1 to 2 times a day  · Wash your hands:  Use soap and warm water to wash your hands  Do this before and after you care for your wound  Hand washing helps prevent an infection  · Remove your bandages gently:  If the bandage sticks to your wound, use warm water on the bandage and lift it off slowly  Lift the edges toward the center of your wound  Carefully wash around the wound with soap and water  Try not to get soap and water directly on your wound  Dry the area and put on new, clean bandages as directed  Change your bandages when they get wet or dirty  Ask how to clean your wounds after your stitches are removed  Self-care:   · Pain:  You may have neck or shoulder pain from gas used during your surgery  Rest and use heat on your shoulder to help decrease the pain  You may be more comfortable if you elevate your head and shoulders on several pillows  · Rest:  You may feel like resting more after your surgery  Slowly start to do more each day  Rest when you feel it is needed  · Prevent constipation:  High-fiber foods, extra liquids, and regular exercise can help you prevent constipation  Examples of high-fiber foods are fruit and bran   Prune juice and water are good liquids to drink  Regular exercise helps your digestive system work  You may also be told to take over-the-counter fiber and stool softener medicines  Take these items as directed  · Vaginal bleeding: You may have vaginal bleeding for a day or two if your laparoscopy was done for a female problem  Ask when you can bathe:  Ask your healthcare provider when you can take a shower or bath  Contact your healthcare provider or surgeon if:   · You have a fever  · You have pain in your abdomen or shoulder that does not go away after 2 days or gets worse  · You have more vaginal bleeding or discharge than you expected  · You have trouble having a bowel movement, or have diarrhea often  · You have repeated vomiting  · You have chills, a cough, or feel weak and achy  · Your stitches are swollen, red, or have pus coming from them  · You have questions or concerns about your condition or care  Seek care immediately or call 911 if:   · Your incisions come apart  · Your incisions bleed, or blood soaks through your bandage  · Your arm or leg feels warm, tender, and painful  It may look swollen and red  · You suddenly feel lightheaded and short of breath  · You have chest pain when you take a deep breath or cough  You may cough up blood  © 2017 [address] Information is for End User's use only and may not be sold, redistributed or otherwise used for commercial purposes  All illustrations and images included in CareNotes® are the copyrighted property of A D A M , Inc  or [employer]  The above information is an  only  It is not intended as medical advice for individual conditions or treatments  Talk to your doctor, nurse or pharmacist before following any medical regimen to see if it is safe and effective for you  Ibuprofen (By mouth)   Ibuprofen (eye-bue-PROE-fen)  Treats pain and fever  This medicine is an NSAID  Brand Name(s):  Advil, Advil Children's, Advil Liqui-Gels, Advil Migraine, All-Purpose First Aid Kit, Children's Ibuprofen, Children's Motrin, Comfort Pac, Concentrated Motrin Infants' Drops, Equate Ibuprofen Steven Strength, Genpril, Good Neighbor Cap-Profen, Good Neighbor Ibuprofen Infants', [facility] Children's Ibuprofen, [facility] Ibuprofen   There may be other brand names for this medicine  When This Medicine Should Not Be Used: This medicine is not right for everyone  Do not use if you had an allergic reaction (including asthma) to ibuprofen, aspirin, or another NSAID, or right before or after heart surgery  How to Use This Medicine:   Capsule, Liquid Filled Capsule, Suspension, Tablet, Chewable Tablet  · Your doctor will tell you how much medicine to use  Do not use more than directed  · Prescription ibuprofen should come with a Medication Guide  Ask your pharmacist for the Medication Guide if you do not have one  · Follow the instructions on the medicine label if you are using this medicine without a prescription  · Take this medicine with food or milk if it upsets your stomach  · Oral liquid: Shake well just before using  Measure with a marked measuring spoon, oral syringe, or medicine cup  · Chewable tablet: Chew completely before you swallow it  Then drink some water to make sure you swallow all of the medicine  · For Children: Ask your pharmacist if you are not sure how much medicine to give a child  The dose is usually based on weight, not age  Never give more medicine than directed  · For Adults: Do not take more than 6 pills in 1 day (24 hours) unless your doctor tells you to  · Missed dose: If you take this medicine on a regular basis and miss a dose, take it as soon as you can  If it is almost time for your next dose, wait until then to use the medicine and skip the missed dose  Do not use extra medicine to make up for a missed dose    · Store the medicine in a closed container at room temperature, away from heat, moisture, and direct light  Do not freeze the oral liquid  Drugs and Foods to Avoid:   Ask your doctor or pharmacist before using any other medicine, including over-the-counter medicines, vitamins, and herbal products  · Some foods and medicine can affect how ibuprofen works  Tell your doctor if you are also using lithium, methotrexate, a blood thinner (such as warfarin), a steroid medicine (such as hydrocortisone, prednisolone, prednisone), a diuretic (water pill), or an ACE inhibitor blood pressure medicine  · Do not use any other NSAID medicine unless your doctor says it is okay  Some other NSAIDs are aspirin, diclofenac, naproxen, or celecoxib  · Do not drink alcohol while you are using this medicine  Warnings While Using This Medicine:   · Tell your doctor if you are pregnant or breastfeeding  Do not use this medicine during the later part of pregnancy  · Tell your doctor if you have kidney disease, liver disease, asthma, lupus or a similar connective tissue disease, or a history of ulcers or other digestion problems  Tell your doctor if you smoke or have heart or blood circulation problems, including high blood pressure, heart failure (CHF), or bleeding problems  · This medicine may cause the following problems:  ¨ Bleeding and ulcers in the stomach or intestines  ¨ Higher risk of heart attack or stroke  ¨ Liver damage  ¨ Kidney damage  ¨ Vision problems  · Call your doctor if symptoms get worse, pain lasts more than 10 days, or fever lasts more than 3 days  · This medicine might contain sugar or phenylalanine (aspartame)  · Tell any doctor or dentist who treats you that you are using this medicine  · Keep all medicine out of the reach of children  Never share your medicine with anyone    Possible Side Effects While Using This Medicine:   Call your doctor right away if you notice any of these side effects:  · Allergic reaction: Itching or hives, swelling in your face or hands, swelling or tingling in your mouth or throat, chest tightness, trouble breathing  · Blistering, peeling, or red skin rash  · Change in how much or how often you urinate  · Chest pain that may spread to your arms, jaw, back, or neck, trouble breathing, nausea, unusual sweating, faintness  · Chest pain, trouble breathing, weakness on one side of your body, severe headache, trouble seeing or talking, pain in your lower leg  · Dark urine or pale stools, nausea, vomiting, loss of appetite, stomach pain, yellow skin or eyes  · Fever, neck pain, stiff neck  · Severe stomach pain, vomiting blood, bloody or black, tarry stools  · Swelling in your hands, ankles, or feet, rapid weight gain  · Trouble seeing, blind spots, change in how you see colors  · Unusual bleeding, bruising, or weakness  If you notice these less serious side effects, talk with your doctor:   · Constipation, diarrhea, gas, mild upset stomach  · Dizziness, headache, ringing in the ears  If you notice other side effects that you think are caused by this medicine, tell your doctor  Call your doctor for medical advice about side effects  You may report side effects to FDA at 6-291-FDA-8992  © 2017 [address]  Information is for End User's use only and may not be sold, redistributed or otherwise used for commercial purposes  The above information is an  only  It is not intended as medical advice for individual conditions or treatments  Talk to your doctor, nurse or pharmacist before following any medical regimen to see if it is safe and effective for you  Oxycodone/Acetaminophen (By mouth)   Acetaminophen (b-pgtz-w-MIN-oh-fen), Oxycodone Hydrochloride (kv-j-JFU-done dmitri-droe-KLOR-augie)  Treats moderate to moderately severe pain  This medicine is a narcotic pain reliever  Brand Name(s): Endocet, Percocet, Primlev, Xartemis XR   There may be other brand names for this medicine    When This Medicine Should Not Be Used:   This medicine is not right for everyone  Do not use it if you had an allergic reaction to acetaminophen or oxycodone, or if you have serious breathing problems or paralytic ileus  How to Use This Medicine:   Capsule, Liquid, Tablet, Long Acting Tablet  · Your doctor will tell you how much medicine to use  Do not use more than directed  · An overdose can be dangerous  Follow directions carefully so you do not get too much medicine at one time  · Oral liquid: Measure the oral liquid medicine with a marked measuring spoon, oral syringe, or medicine cup  · Swallow the extended-release tablet whole  Do not crush, break, or chew it  Do not lick or wet the tablet before placing it in your mouth  Do not give this medicine through a feeding tube  · This medicine should come with a Medication Guide  Ask your pharmacist for a copy if you do not have one  · Missed dose: If you miss a dose of this medicine, skip the missed dose and go back to your regular dosing schedule  Do not double doses  · Store the medicine in a closed container at room temperature, away from heat, moisture, and direct light  Ask your pharmacist about the best way to dispose of medicine you do not use  Drugs and Foods to Avoid:   Ask your doctor or pharmacist before using any other medicine, including over-the-counter medicines, vitamins, and herbal products  · Do not use Xartemis XR if you are using or have used an MAO inhibitor in the past 14 days  · Some medicines can affect how this medicine works  Tell your doctor if you are using any of the following:   ¨ Carbamazepine, erythromycin, ketoconazole, lamotrigine, mirtazapine, naltrexone, phenytoin, propranolol, rifampin, ritonavir, tramadol, trazodone, or zidovudine  ¨ Birth control pills  ¨ Diuretic (water pill)  ¨ Medicine to treat depression  ¨ Phenothiazine medicine  ¨ Triptan medicine to treat migraine headaches  · Do not drink alcohol while you are using this medicine  Acetaminophen can damage your liver, and alcohol can increase this risk  Do not take acetaminophen without asking your doctor if you have 3 or more drinks of alcohol every day  · Tell your doctor if you use anything else that makes you sleepy  Some examples are allergy medicine, narcotic pain medicine, and alcohol  Tell your doctor if you are using buprenorphine, butorphanol, nalbuphine, pentazocine, a benzodiazepine, or a muscle relaxer  Warnings While Using This Medicine:   · Tell your doctor if you are pregnant or breastfeeding, or if you have kidney disease, liver disease, heart disease, low blood pressure, breathing problems or lung disease (such as asthma, COPD), thyroid problems, [place] disease, pancreas or gallbladder problems, prostate problems, trouble urinating, or a stomach problems, or a history of head injury or brain damage, seizures, or alcohol or drug abuse  Tell your doctor if you are allergic to codeine  · This medicine may cause the following problems:  ¨ High risk of overdose, which can lead to death  ¨ Respiratory depression (serious breathing problem that can be life-threatening)  ¨ Liver problems  ¨ Serious skin reactions  ¨ Serotonin syndrome (when used with certain medicines)  · This medicine may make you dizzy or drowsy  Do not drive or do anything that could be dangerous until you know how this medicine affects you  Sit or lie down if you feel dizzy  Stand up carefully  · This medicine contains acetaminophen  Read the labels of all other medicines you are using to see if they also contain acetaminophen, or ask your doctor or pharmacist  Luis Croft not use more than 4 grams (4,000 milligrams) total of acetaminophen in one day  · This medicine can be habit-forming  Do not use more than your prescribed dose  Call your doctor if you think your medicine is not working  · Do not stop using this medicine suddenly   Your doctor will need to slowly decrease your dose before you stop it completely  · This medicine could cause infertility  Talk with your doctor before using this medicine if you plan to have children  · This medicine may cause constipation, especially with long-term use  Ask your doctor if you should use a laxative to prevent and treat constipation  · Keep all medicine out of the reach of children  Never share your medicine with anyone  Possible Side Effects While Using This Medicine:   Call your doctor right away if you notice any of these side effects:  · Allergic reaction: Itching or hives, swelling in your face or hands, swelling or tingling in your mouth or throat, chest tightness, trouble breathing  · Anxiety, restlessness, fast heartbeat, fever, muscle spasms, twitching, diarrhea, seeing or hearing things that are not there  · Blistering, peeling, red skin rash  · Blue lips, fingernails, or skin  · Dark urine or pale stools, loss of appetite, stomach pain, yellow skin or eyes  · Extreme weakness, shallow breathing, uneven heartbeat, seizures, sweating, or cold or clammy skin  · Severe confusion, lightheadedness, dizziness, or fainting  · Severe constipation, nausea, or vomiting  · Trouble breathing or slow breathing  If you notice these less serious side effects, talk with your doctor:   · Headache  · Mild constipation, nausea, or vomiting  · Mild sleepiness or drowsiness  If you notice other side effects that you think are caused by this medicine, tell your doctor  Call your doctor for medical advice about side effects  You may report side effects to FDA at 6-898-FDA-9371  © 2017 [address] Information is for End User's use only and may not be sold, redistributed or otherwise used for commercial purposes  The above information is an  only  It is not intended as medical advice for individual conditions or treatments   Talk to your doctor, nurse or pharmacist before following any medical regimen to see if it is safe and effective for you

## 2018-03-01 NOTE — INTERVAL H&P NOTE
H&P reviewed  After examining the patient I find no changes in the patients condition since the H&P had been written  Abdomen has been marked on the right side for surgical identification

## 2018-03-08 ENCOUNTER — OFFICE VISIT (OUTPATIENT)
Dept: OBGYN CLINIC | Facility: CLINIC | Age: 23
End: 2018-03-08

## 2018-03-08 VITALS — WEIGHT: 196 LBS | SYSTOLIC BLOOD PRESSURE: 110 MMHG | BODY MASS INDEX: 30.7 KG/M2 | DIASTOLIC BLOOD PRESSURE: 70 MMHG

## 2018-03-08 DIAGNOSIS — Z09 POSTOP CHECK: Primary | ICD-10-CM

## 2018-03-08 PROBLEM — G89.18 POST-OPERATIVE PAIN: Status: RESOLVED | Noted: 2018-03-01 | Resolved: 2018-03-08

## 2018-03-08 PROCEDURE — 99024 POSTOP FOLLOW-UP VISIT: CPT | Performed by: OBSTETRICS & GYNECOLOGY

## 2018-03-08 NOTE — PROGRESS NOTES
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 Patient's last menstrual period was 2018 (exact date)  who presents for post operative examination s/p Laparoscopic right salpingocystectomy for 11 cm right fallopian tube cyst  The pt reports she feels well and all incision are healing well  Her pain is well controlled and she no longer is using any pain medications  She is performing all ADLS  She denies fever/chills/purulent drainage  We reviewed that her pathology was benign and her cytology was unremarkable  We reviewed laparoscopic photos together  We also reviewed that she may have a higher chance of ectopic pregnancy in the future as she had tubal surgery  Pt should call with any pregnancy to ensure it is intrauterine  Pt verbalizes understanding  Past Medical History:   Diagnosis Date    Anal itching     since childhood    Chicken pox     Cyst of fallopian tube 2017    Right side, 11 cm, benign    Generalized headaches        Past Surgical History:   Procedure Laterality Date    NOSE SURGERY      Nasal Septum deviation repair, lazer    NE REMOVAL OF OVARIAN CYST(S) Right 3/1/2018    Procedure: LAPAROSCOPIC RIGHT SALPINGOCYSTECTOMY;  Surgeon: Justino Landon MD;  Location: [facility];  Service: Gynecology       OB History    Para Term  AB Living   0 0 0 0 0 0   SAB TAB Ectopic Multiple Live Births   0 0 0 0 0             Gyn HX:  denies STD, abnormal pap, significant dysmenorrhea or irregular menses    No current outpatient prescriptions on file      No Known Allergies    Social History     Social History    Marital status: /Civil Union     Spouse name: N/A    Number of children: N/A    Years of education: college student     Occupational History    student      Social History Main Topics    Smoking status: Never Smoker    Smokeless tobacco: Never Used    Alcohol use Yes      Comment: occasional    Drug use: No    Sexual activity: Yes     Partners: Male     Birth control/ protection: Rhythm     Other Topics Concern    None     Social History Narrative    College student    [employer]    Hookah pipe smoker    S[place]etary lifestyle       Family History   Problem Relation Age of Onset    Hyperlipidemia Mother     Heart disease Mother     Heart attack Father 36    Heart disease Father     Gopi's disease Maternal Grandmother     Breast cancer Neg Hx     Ovarian cancer Neg Hx     Colon cancer Neg Hx        Review of Systems   Constitutional: Negative for chills, fatigue, fever and unexpected weight change  HENT: Negative for congestion, mouth sores and sore throat  Respiratory: Negative for cough, chest tightness, shortness of breath and wheezing  Cardiovascular: Negative for chest pain and palpitations  Gastrointestinal: Negative for abdominal distention, abdominal pain, constipation, diarrhea, nausea and vomiting  Endocrine: Negative for cold intolerance and heat intolerance  Genitourinary: Negative for dyspareunia, dysuria, genital sores, menstrual problem, pelvic pain, vaginal bleeding, vaginal discharge and vaginal pain  Musculoskeletal: Negative for arthralgias  Skin: Negative for color change and rash  Neurological: Negative for dizziness, light-headedness and headaches  Hematological: Negative for adenopathy  Blood pressure 110/70, weight 88 9 kg (196 lb), last menstrual period 02/09/2018, not currently breastfeeding  and Body mass index is 30 7 kg/m²  Physical Exam   Constitutional: She is oriented to person, place, and time  She appears well-developed and well-nourished  HENT:   Head: Normocephalic and atraumatic  Eyes: Conjunctivae and EOM are normal    Neck: Normal range of motion  Pulmonary/Chest: Effort normal    Abdominal: Soft  Bowel sounds are normal  She exhibits no distension and no mass  There is no tenderness  There is no rebound and no guarding  Incisions c/d/i x 3  No erythema, purulence or drainage  Non tender on examination   Musculoskeletal: Normal range of motion  Neurological: She is alert and oriented to person, place, and time  Skin: Skin is warm  No rash noted  No erythema  Psychiatric: She has a normal mood and affect  Her behavior is normal  Judgment and thought content normal              A/P:  Pt is a 25 y o  Romain [place] with      Diagnoses and all orders for this visit:    Postop check      Doing well  Follow up prn or for annual gyn examination

## 2018-06-07 ENCOUNTER — ANNUAL EXAM (OUTPATIENT)
Dept: OBGYN CLINIC | Facility: CLINIC | Age: 23
End: 2018-06-07
Payer: COMMERCIAL

## 2018-06-07 VITALS
SYSTOLIC BLOOD PRESSURE: 140 MMHG | WEIGHT: 198 LBS | BODY MASS INDEX: 31.82 KG/M2 | DIASTOLIC BLOOD PRESSURE: 82 MMHG | HEIGHT: 66 IN

## 2018-06-07 DIAGNOSIS — Z23 NEED FOR HPV VACCINATION: ICD-10-CM

## 2018-06-07 DIAGNOSIS — Z01.419 ENCOUNTER FOR ANNUAL ROUTINE GYNECOLOGICAL EXAMINATION: Primary | ICD-10-CM

## 2018-06-07 DIAGNOSIS — E58 DIETARY CALCIUM DEFICIENCY: ICD-10-CM

## 2018-06-07 DIAGNOSIS — Z12.4 PAP SMEAR FOR CERVICAL CANCER SCREENING: ICD-10-CM

## 2018-06-07 DIAGNOSIS — Z72.3 INADEQUATE EXERCISE: ICD-10-CM

## 2018-06-07 PROBLEM — Z09 POSTOP CHECK: Status: RESOLVED | Noted: 2018-03-08 | Resolved: 2018-06-07

## 2018-06-07 PROCEDURE — 99395 PREV VISIT EST AGE 18-39: CPT | Performed by: OBSTETRICS & GYNECOLOGY

## 2018-06-07 PROCEDURE — 90651 9VHPV VACCINE 2/3 DOSE IM: CPT

## 2018-06-07 PROCEDURE — 90471 IMMUNIZATION ADMIN: CPT

## 2018-06-07 NOTE — PROGRESS NOTES
Pt is a 25 y o  [facility] with Patient's last menstrual period was 2018  using [facility] for [facility] presents for preventive care  She notes the same partner since her last STI evaluation  In her lifetime she has been involved with 1 partner   Safe sexual practices (monogomy, condoms) are followed consistently  · She does  feel safe in the relationship  She does feel safe in her home  · Her calcium intake encompasses milk (cow, goat, almond, cashew, soy, etc), cheese and yogurt for a total of 1-2 servings daily on average  She does not take additional Vitamin D (MVI or supplement)  · She exercises irregular times per week  · Her menses occur every 28  Days, last 6-7 days and require overnight pad every 6-7 hours  Menstrual History:  OB History      Para Term  AB Living    0 0 0 0 0 0    SAB TAB Ectopic Multiple Live Births    0 0 0 0 0        Obstetric Comments    Menarche: 15                              Menarche age: 15  Patient's last menstrual period was 2018  · She has never recieved an HPV vaccine  · tobacco use : does not use tobacco              · Last pap: never--will obtain today  · Pt encouraged to have HPV vaccination       Past Medical History:   Diagnosis Date    Anal itching     since childhood    Chicken pox     Cyst of fallopian tube 2017    Right side, 11 cm, benign    Generalized headaches        Past Surgical History:   Procedure Laterality Date    NOSE SURGERY      Nasal Septum deviation repair, lazer    ME REMOVAL OF OVARIAN CYST(S) Right 3/1/2018    Procedure: LAPAROSCOPIC RIGHT SALPINGOCYSTECTOMY;  Surgeon: Kirstie Mejia MD;  Location: [facility] OR;  Service: Gynecology       OB History    Para Term  AB Living   0 0 0 0 0 0   SAB TAB Ectopic Multiple Live Births   0 0 0 0 0         Obstetric Comments   Menarche: 15                                      Gyn HX:  denies STD, abnormal pap, significant dysmenorrhea or irregular menses     No current outpatient prescriptions on file  No Known Allergies    Social History     Social History    Marital status: /Civil Union     Spouse name: N/A    Number of children: 0    Years of education: college student     Occupational History    student      Social History Main Topics    Smoking status: Never Smoker    Smokeless tobacco: Never Used    Alcohol use Yes      Comment: occasional    Drug use: No    Sexual activity: Yes     Partners: Male     Birth control/ protection: Rhythm      Comment: lifetime partners: 1     Other Topics Concern    None     Social History Narrative    College student    [place] orthodox [place]ian    Hookah pipe smoker    Sedetary lifestyle    Accepts blood products       Family History   Problem Relation Age of Onset    Hyperlipidemia Mother     Heart disease Mother     Heart attack Father 36    Heart disease Father     [place]'s disease Maternal Grandmother     Breast cancer Neg Hx     Ovarian cancer Neg Hx     Colon cancer Neg Hx        Blood pressure 140/82, height 5' 6" (1 676 m), weight 89 8 kg (198 lb), last menstrual period 06/01/2018, not currently breastfeeding  and Body mass index is 31 96 kg/m²  Physical Exam   Constitutional: She is oriented to person, place, and time  She appears well-developed and well-nourished  HENT:   Head: Normocephalic and atraumatic  Eyes: Conjunctivae and EOM are normal    Neck: Normal range of motion  Neck supple  No tracheal deviation present  No thyromegaly present  Cardiovascular: Normal rate, regular rhythm and normal heart sounds  Pulmonary/Chest: Effort normal and breath sounds normal  No stridor  Abdominal: Soft  Bowel sounds are normal  She exhibits no distension and no mass  There is no tenderness  There is no rebound and no guarding  Musculoskeletal: Normal range of motion  She exhibits no edema or tenderness  Lymphadenopathy:     She has no cervical adenopathy     Neurological: She is alert and oriented to person, place, and time  Skin: Skin is warm  No rash noted  No erythema  Psychiatric: She has a normal mood and affect  Her behavior is normal  Judgment and thought content normal      Breasts: breasts appear normal, no suspicious masses, no skin or nipple changes or axillary nodes, right breast normal without mass, skin or nipple changes or axillary nodes, left breast normal without mass, skin or nipple changes or axillary nodes  vulva: normal external genitalia for age and no lesions, masses, epithelial changes, or exudate  vagina: color pink, rugae  well formed rugae, discharge  white and bleeding  without any bleeding  cervix: nullip, no lesions  and pap obtained  uterus: NSSC, AF, NT, mobile  adnexa: no masses or tenderness      A/P:  Pt is a 25 y o  Guillermo Mems with      Emerita Files was seen today for gynecologic exam     Diagnoses and all orders for this visit:    Encounter for annual routine gynecological examination    Pap smear for cervical cancer screening  -     Thinprep Tis Pap (Refl) HPV mRNA E6/E7    Dietary calcium deficiency    Inadequate exercise    Need for HPV vaccination  -     HPV VACCINE 9 VALENT IM      Patient advised recommendation of daily dietary calcium of  1000 mg calcium  Patient advised recommendation of exercise 5 times per week for 30 minutes  Patient advised recommendation of BMI to be between 19-25

## 2018-06-11 LAB
CLINICAL INFO: NORMAL
CYTO CVX: NORMAL
CYTOLOGY CMNT CVX/VAG CYTO-IMP: NORMAL
DATE PREVIOUS BX: NORMAL
LMP START DATE: NORMAL
QUESTION/PROBLEM: NORMAL
SL AMB CONTAINER TYPE: NORMAL
SL AMB FINAL RESOLUTION: NORMAL
SL AMB PREV. PAP:: NORMAL
SL AMB REPORT STATUS: NORMAL
SPECIMEN SOURCE CVX/VAG CYTO: NORMAL

## 2018-06-25 ENCOUNTER — OFFICE VISIT (OUTPATIENT)
Dept: FAMILY MEDICINE CLINIC | Facility: CLINIC | Age: 23
End: 2018-06-25
Payer: COMMERCIAL

## 2018-06-25 VITALS
BODY MASS INDEX: 33.59 KG/M2 | HEIGHT: 65 IN | SYSTOLIC BLOOD PRESSURE: 110 MMHG | WEIGHT: 201.6 LBS | TEMPERATURE: 97.6 F | HEART RATE: 96 BPM | DIASTOLIC BLOOD PRESSURE: 72 MMHG | RESPIRATION RATE: 20 BRPM | OXYGEN SATURATION: 100 %

## 2018-06-25 DIAGNOSIS — L40.9 PSORIASIS: ICD-10-CM

## 2018-06-25 DIAGNOSIS — R63.5 WEIGHT GAIN: ICD-10-CM

## 2018-06-25 DIAGNOSIS — E61.1 IRON DEFICIENCY: Primary | ICD-10-CM

## 2018-06-25 DIAGNOSIS — F17.200 SMOKING: ICD-10-CM

## 2018-06-25 PROCEDURE — 99214 OFFICE O/P EST MOD 30 MIN: CPT | Performed by: FAMILY MEDICINE

## 2018-06-25 NOTE — PROGRESS NOTES
Assessment/Plan:    No problem-specific Assessment & Plan notes found for this encounter  Diagnoses and all orders for this visit:    Iron deficiency  Comments:  Home check Hemoccult  To consider starting iron supplement after that okay  Psoriasis  Comments:  cleared     Weight gain  Comments: To follow was low-fat diet  exercise   recommend referral to [facility] pt declined    Smoking  Comments:  advise to stop smoking          Subjective:      Patient ID: Rob Morfin is a 25 y o  female     on deficiency   Denied fatigue  Denied bleeding  Did not do Hemoccult, not taking iron supplements   Psoriasis   Patient took steroids cream    Patient stated skin lesions resolved  Weight gain  Patient did not change her diet  But less active  Patient used to live in [facility]  used to walk 2 KM daily  denied hair loss , fatigue , cold intolerance   Smoking   Still smoking Hooka   Denied SOB, cough    no tests done since last ov     The following portions of the patient's history were reviewed and updated as appropriate: allergies, current medications, past family history, past medical history, past social history, past surgical history and problem list     Review of Systems   Constitutional: Negative for chills, diaphoresis and fatigue  HENT: Negative for ear pain, sore throat, trouble swallowing and voice change  Eyes: Negative for visual disturbance  Respiratory: Negative for cough, chest tightness and shortness of breath  Cardiovascular: Negative for chest pain, palpitations and leg swelling  Gastrointestinal: Negative for abdominal pain, blood in stool, constipation, diarrhea and nausea  Endocrine: Negative for polydipsia and polyuria  Genitourinary: Negative for dysuria, flank pain, frequency, hematuria, pelvic pain, urgency, vaginal bleeding and vaginal discharge  Musculoskeletal: Negative for arthralgias, back pain, gait problem, myalgias and neck pain     Neurological: Negative for dizziness, tremors, seizures, weakness, light-headedness, numbness and headaches  Hematological: Negative for adenopathy  Does not bruise/bleed easily  Psychiatric/Behavioral: Negative for confusion  Objective:      /72 (BP Location: Left arm, Patient Position: Sitting, Cuff Size: Standard)   Pulse 96   Temp 97 6 °F (36 4 °C)   Resp 20   Ht 5' 4 96" (1 65 m)   Wt 91 4 kg (201 lb 9 6 oz)   LMP 06/01/2018   SpO2 100%   BMI 33 59 kg/m²          Physical Exam   Constitutional: She is oriented to person, place, and time  She appears well-developed and well-nourished  HENT:   Head: Normocephalic  Eyes: No scleral icterus  Neck: Neck supple  Cardiovascular: Normal rate and regular rhythm  No murmur heard  Pulmonary/Chest: Effort normal and breath sounds normal    Abdominal: She exhibits no distension and no mass  There is no tenderness  There is no rebound  Musculoskeletal: She exhibits no edema or tenderness  Lymphadenopathy:     She has no cervical adenopathy  Neurological: She is alert and oriented to person, place, and time  No cranial nerve deficit  Skin: No rash noted  No erythema  No pallor  Psychiatric: She has a normal mood and affect   Her behavior is normal  Judgment and thought content normal

## 2018-07-02 ENCOUNTER — TRANSCRIBE ORDERS (OUTPATIENT)
Dept: LAB | Facility: HOSPITAL | Age: 23
End: 2018-07-02

## 2018-07-02 ENCOUNTER — APPOINTMENT (OUTPATIENT)
Dept: LAB | Facility: HOSPITAL | Age: 23
End: 2018-07-02
Payer: COMMERCIAL

## 2018-07-02 DIAGNOSIS — E61.1 IRON DEFICIENCY: ICD-10-CM

## 2018-07-02 LAB
BASOPHILS # BLD AUTO: 0 THOUSANDS/ΜL (ref 0–0.1)
BASOPHILS NFR BLD AUTO: 0 % (ref 0–1)
EOSINOPHIL # BLD AUTO: 0.2 THOUSAND/ΜL (ref 0–0.4)
EOSINOPHIL NFR BLD AUTO: 2 % (ref 0–6)
ERYTHROCYTE [DISTWIDTH] IN BLOOD BY AUTOMATED COUNT: 14.8 %
HCT VFR BLD AUTO: 39.4 % (ref 36–46)
HGB BLD-MCNC: 12.5 G/DL (ref 12–16)
LYMPHOCYTES # BLD AUTO: 2.6 THOUSANDS/ΜL (ref 0.5–4)
LYMPHOCYTES NFR BLD AUTO: 27 % (ref 20–50)
MCH RBC QN AUTO: 24.6 PG (ref 26–34)
MCHC RBC AUTO-ENTMCNC: 31.7 G/DL (ref 31–36)
MCV RBC AUTO: 78 FL (ref 80–100)
MICROCYTES BLD QL AUTO: PRESENT
MONOCYTES # BLD AUTO: 0.9 THOUSAND/ΜL (ref 0.2–0.9)
MONOCYTES NFR BLD AUTO: 9 % (ref 1–10)
NEUTROPHILS # BLD AUTO: 5.8 THOUSANDS/ΜL (ref 1.8–7.8)
NEUTS SEG NFR BLD AUTO: 61 % (ref 45–65)
PLATELET # BLD AUTO: 309 THOUSANDS/UL (ref 150–450)
PLATELET BLD QL SMEAR: ADEQUATE
PMV BLD AUTO: 8.7 FL (ref 8.9–12.7)
RBC # BLD AUTO: 5.06 MILLION/UL (ref 4–5.2)
RBC MORPH BLD: NORMAL
WBC # BLD AUTO: 9.5 THOUSAND/UL (ref 4.5–11)

## 2018-07-02 PROCEDURE — 36415 COLL VENOUS BLD VENIPUNCTURE: CPT

## 2018-07-02 PROCEDURE — 85025 COMPLETE CBC W/AUTO DIFF WBC: CPT

## 2018-07-13 ENCOUNTER — TRANSCRIBE ORDERS (OUTPATIENT)
Dept: ADMINISTRATIVE | Facility: HOSPITAL | Age: 23
End: 2018-07-13

## 2018-07-13 DIAGNOSIS — E61.1 IRON DEFICIENCY: Primary | ICD-10-CM

## 2018-07-13 DIAGNOSIS — E61.1 LOW IRON: Primary | ICD-10-CM

## 2018-07-19 ENCOUNTER — OFFICE VISIT (OUTPATIENT)
Dept: FAMILY MEDICINE CLINIC | Facility: CLINIC | Age: 23
End: 2018-07-19
Payer: COMMERCIAL

## 2018-07-19 VITALS
HEART RATE: 83 BPM | SYSTOLIC BLOOD PRESSURE: 110 MMHG | DIASTOLIC BLOOD PRESSURE: 70 MMHG | HEIGHT: 66 IN | RESPIRATION RATE: 18 BRPM | BODY MASS INDEX: 32.27 KG/M2 | TEMPERATURE: 98.1 F | WEIGHT: 200.8 LBS | OXYGEN SATURATION: 98 %

## 2018-07-19 DIAGNOSIS — Z01.84 IMMUNITY STATUS TESTING: Primary | ICD-10-CM

## 2018-07-19 DIAGNOSIS — E61.1 IRON DEFICIENCY: ICD-10-CM

## 2018-07-19 PROCEDURE — 3008F BODY MASS INDEX DOCD: CPT | Performed by: FAMILY MEDICINE

## 2018-07-19 PROCEDURE — 99213 OFFICE O/P EST LOW 20 MIN: CPT | Performed by: FAMILY MEDICINE

## 2018-07-19 RX ORDER — FERROUS SULFATE 325(65) MG
TABLET ORAL
Qty: 60 TABLET | Refills: 1 | Status: SHIPPED | OUTPATIENT
Start: 2018-07-19 | End: 2018-09-13 | Stop reason: SDUPTHER

## 2018-07-19 NOTE — PROGRESS NOTES
Assessment/Plan:      Diagnoses and all orders for this visit:    Immunity status testing  -     Measles/Mumps/Rubella Immunity; Future    Iron deficiency  Comments:  Recommend to check Hemoccult  Patient decline  Recheck blood work in 3 months  Orders:  -     ferrous sulfate (FEOSOL) 325 (65 Fe) mg tablet; Take 1 tablet twice a day, p o   -     CBC and differential; Future  -     Iron, TIBC and Ferritin Panel; Future  -     Iron Saturation %; Future          Subjective:     Patient ID: Rina Waddell is a 25 y o  female  Iron deficient  Patient denied rectal bleeding  Melena  Patient stated she need blood test to check for immunity for her measles, rubella and mumps for school     Test result  CBC done on July 2, 2018 discussed  hemoccult not done it was the wrong container     Last menstrual period June 26, 2018        Review of Systems   Constitutional: Negative for appetite change, chills, fatigue and fever  HENT: Negative for rhinorrhea and sore throat  Eyes: Negative for visual disturbance  Respiratory: Negative for cough, chest tightness and shortness of breath  Cardiovascular: Negative for chest pain, palpitations and leg swelling  Gastrointestinal: Negative for abdominal pain, blood in stool, constipation, nausea and vomiting  Genitourinary: Negative for dysuria, frequency, hematuria, vaginal bleeding and vaginal discharge  Musculoskeletal: Negative for arthralgias, back pain, gait problem, joint swelling and myalgias  Skin: Negative for rash  Neurological: Negative for dizziness, weakness and numbness  Objective:     Physical Exam   Constitutional: She is oriented to person, place, and time  She appears well-developed  No distress  Eyes: No scleral icterus  Neck: Neck supple  Cardiovascular: Normal rate and regular rhythm  No murmur heard  Pulmonary/Chest: Effort normal and breath sounds normal    Abdominal: Soft  Bowel sounds are normal  She exhibits no mass  There is no tenderness  There is no rebound and no guarding  Musculoskeletal: She exhibits no edema or tenderness  Lymphadenopathy:     She has no cervical adenopathy  Neurological: She is alert and oriented to person, place, and time  Skin: No pallor  Psychiatric: She has a normal mood and affect

## 2018-07-23 ENCOUNTER — APPOINTMENT (OUTPATIENT)
Dept: LAB | Age: 23
End: 2018-07-23
Payer: COMMERCIAL

## 2018-07-23 DIAGNOSIS — Z01.84 IMMUNITY STATUS TESTING: ICD-10-CM

## 2018-07-23 DIAGNOSIS — E61.1 IRON DEFICIENCY: ICD-10-CM

## 2018-07-23 LAB
BASOPHILS # BLD AUTO: 0.03 THOUSANDS/ΜL (ref 0–0.1)
BASOPHILS NFR BLD AUTO: 0 % (ref 0–1)
EOSINOPHIL # BLD AUTO: 0.15 THOUSAND/ΜL (ref 0–0.61)
EOSINOPHIL NFR BLD AUTO: 2 % (ref 0–6)
ERYTHROCYTE [DISTWIDTH] IN BLOOD BY AUTOMATED COUNT: 14.5 % (ref 11.6–15.1)
HCT VFR BLD AUTO: 39.7 % (ref 34.8–46.1)
HGB BLD-MCNC: 12.2 G/DL (ref 11.5–15.4)
IMM GRANULOCYTES # BLD AUTO: 0.04 THOUSAND/UL (ref 0–0.2)
IMM GRANULOCYTES NFR BLD AUTO: 0 % (ref 0–2)
LYMPHOCYTES # BLD AUTO: 2.38 THOUSANDS/ΜL (ref 0.6–4.47)
LYMPHOCYTES NFR BLD AUTO: 25 % (ref 14–44)
MCH RBC QN AUTO: 24.4 PG (ref 26.8–34.3)
MCHC RBC AUTO-ENTMCNC: 30.7 G/DL (ref 31.4–37.4)
MCV RBC AUTO: 79 FL (ref 82–98)
MONOCYTES # BLD AUTO: 0.7 THOUSAND/ΜL (ref 0.17–1.22)
MONOCYTES NFR BLD AUTO: 8 % (ref 4–12)
NEUTROPHILS # BLD AUTO: 6.07 THOUSANDS/ΜL (ref 1.85–7.62)
NEUTS SEG NFR BLD AUTO: 65 % (ref 43–75)
NRBC BLD AUTO-RTO: 0 /100 WBCS
PLATELET # BLD AUTO: 267 THOUSANDS/UL (ref 149–390)
PMV BLD AUTO: 11.1 FL (ref 8.9–12.7)
RBC # BLD AUTO: 5.01 MILLION/UL (ref 3.81–5.12)
RUBV IGG SERPL IA-ACNC: 146.1 IU/ML
WBC # BLD AUTO: 9.37 THOUSAND/UL (ref 4.31–10.16)

## 2018-07-23 PROCEDURE — 86762 RUBELLA ANTIBODY: CPT

## 2018-07-23 PROCEDURE — 86735 MUMPS ANTIBODY: CPT

## 2018-07-23 PROCEDURE — 85025 COMPLETE CBC W/AUTO DIFF WBC: CPT

## 2018-07-23 PROCEDURE — 86765 RUBEOLA ANTIBODY: CPT

## 2018-07-23 PROCEDURE — 36415 COLL VENOUS BLD VENIPUNCTURE: CPT

## 2018-07-24 LAB
MEV IGG SER QL: NORMAL
MUV IGG SER QL: NORMAL

## 2018-07-27 ENCOUNTER — TELEPHONE (OUTPATIENT)
Dept: FAMILY MEDICINE CLINIC | Facility: CLINIC | Age: 23
End: 2018-07-27

## 2018-07-30 NOTE — TELEPHONE ENCOUNTER
Patient called and told of immunity of MMR and that her form will be filled  She was also told that she did her CBC too soon and that it will need to be redone in 2-3 months, Patient understands

## 2018-07-31 ENCOUNTER — DOCUMENTATION (OUTPATIENT)
Dept: FAMILY MEDICINE CLINIC | Facility: CLINIC | Age: 23
End: 2018-07-31

## 2018-07-31 ENCOUNTER — TELEPHONE (OUTPATIENT)
Dept: FAMILY MEDICINE CLINIC | Facility: CLINIC | Age: 23
End: 2018-07-31

## 2018-07-31 NOTE — TELEPHONE ENCOUNTER
Left message for pt in regards to her form  Form was not given to me   Need to know if she still has it in her possession

## 2018-08-21 ENCOUNTER — CLINICAL SUPPORT (OUTPATIENT)
Dept: OBGYN CLINIC | Facility: CLINIC | Age: 23
End: 2018-08-21
Payer: COMMERCIAL

## 2018-08-21 VITALS — BODY MASS INDEX: 33.27 KG/M2 | WEIGHT: 203.6 LBS | DIASTOLIC BLOOD PRESSURE: 84 MMHG | SYSTOLIC BLOOD PRESSURE: 118 MMHG

## 2018-08-21 DIAGNOSIS — Z23 NEED FOR HPV VACCINATION: Primary | ICD-10-CM

## 2018-08-21 PROCEDURE — 90471 IMMUNIZATION ADMIN: CPT | Performed by: OBSTETRICS & GYNECOLOGY

## 2018-08-21 PROCEDURE — 90651 9VHPV VACCINE 2/3 DOSE IM: CPT | Performed by: OBSTETRICS & GYNECOLOGY

## 2018-08-21 NOTE — PROGRESS NOTES
Pt tolerated 2nd Gardasil 9 Injection IM in right upper arm w/out any complication, has 3rd Gardasil injection scheduled      Jonny Moreno 47: [id number]  LOT: [id number]  EXP: 6/25/2020

## 2018-09-13 DIAGNOSIS — E61.1 IRON DEFICIENCY: ICD-10-CM

## 2018-09-13 RX ORDER — FERROUS SULFATE 325(65) MG
TABLET ORAL
Qty: 60 TABLET | Refills: 1 | Status: SHIPPED | OUTPATIENT
Start: 2018-09-13 | End: 2018-11-06 | Stop reason: SDUPTHER

## 2018-10-31 ENCOUNTER — APPOINTMENT (OUTPATIENT)
Dept: LAB | Age: 23
End: 2018-10-31
Payer: COMMERCIAL

## 2018-10-31 DIAGNOSIS — E61.1 IRON DEFICIENCY: ICD-10-CM

## 2018-10-31 LAB
FERRITIN SERPL-MCNC: 30 NG/ML (ref 8–388)
IRON SATN MFR SERPL: 22 %
IRON SERPL-MCNC: 74 UG/DL (ref 50–170)
TIBC SERPL-MCNC: 338 UG/DL (ref 250–450)

## 2018-10-31 PROCEDURE — 83550 IRON BINDING TEST: CPT

## 2018-10-31 PROCEDURE — 83540 ASSAY OF IRON: CPT

## 2018-10-31 PROCEDURE — 82728 ASSAY OF FERRITIN: CPT

## 2018-10-31 PROCEDURE — 36415 COLL VENOUS BLD VENIPUNCTURE: CPT

## 2018-11-06 ENCOUNTER — OFFICE VISIT (OUTPATIENT)
Dept: FAMILY MEDICINE CLINIC | Facility: CLINIC | Age: 23
End: 2018-11-06
Payer: COMMERCIAL

## 2018-11-06 VITALS
HEIGHT: 66 IN | WEIGHT: 208.2 LBS | BODY MASS INDEX: 33.46 KG/M2 | RESPIRATION RATE: 20 BRPM | HEART RATE: 77 BPM | DIASTOLIC BLOOD PRESSURE: 78 MMHG | SYSTOLIC BLOOD PRESSURE: 120 MMHG | OXYGEN SATURATION: 99 % | TEMPERATURE: 96.9 F

## 2018-11-06 DIAGNOSIS — E66.9 CLASS 1 OBESITY WITHOUT SERIOUS COMORBIDITY WITH BODY MASS INDEX (BMI) OF 30.0 TO 30.9 IN ADULT, UNSPECIFIED OBESITY TYPE: ICD-10-CM

## 2018-11-06 DIAGNOSIS — L40.9 PSORIASIS: ICD-10-CM

## 2018-11-06 DIAGNOSIS — E61.1 IRON DEFICIENCY: Primary | ICD-10-CM

## 2018-11-06 DIAGNOSIS — R63.5 WEIGHT GAIN: ICD-10-CM

## 2018-11-06 PROCEDURE — 99214 OFFICE O/P EST MOD 30 MIN: CPT | Performed by: FAMILY MEDICINE

## 2018-11-06 PROCEDURE — 3008F BODY MASS INDEX DOCD: CPT | Performed by: FAMILY MEDICINE

## 2018-11-06 RX ORDER — FERROUS SULFATE 325(65) MG
1 TABLET ORAL
Qty: 60 TABLET | Refills: 0 | Status: SHIPPED | OUTPATIENT
Start: 2018-11-06 | End: 2018-11-26

## 2018-11-06 NOTE — PATIENT INSTRUCTIONS
To follow up with her test results      To bring her immunization record with her to update her immunization

## 2018-11-06 NOTE — PROGRESS NOTES
Assessment/Plan:          Diagnoses and all orders for this visit:    Iron deficiency  Comments:   decreased iron supplement to 1 tablet daily  Orders:  -     Comprehensive metabolic panel; Future  -     CBC and differential; Future  -     ferrous sulfate 325 (65 Fe) mg tablet; Take 1 tablet (325 mg total) by mouth daily with breakfast    Weight gain  Comments:    Diet and exercise discussed  Orders:  -     TSH, 3rd generation with Free T4 reflex; Future  -     Lipid Panel with Direct LDL reflex; Future    Class 1 obesity without serious comorbidity with body mass index (BMI) of 30 0 to 30 9 in adult, unspecified obesity type  Comments:   consel patient about obesity   advised to lose weight  Psoriasis  Comments:   resolved            Subjective:     Patient ID: Marylene Bottom is a 21 y o  female       Patient is here for follow-up on chronic medical problem  Low iron  Patient had been taking iron supplement 1 tablet twice a day  Denied fatigue, dizziness or bleeding  Obesity  Patient stated she is trying to lose weight she had been exercising but not able to lose the weight  Admit to regular fat intake  Denied fatigue, cold intolerance or hair loss  Psoriasis  Patient stated she has no further skin lesion  Test results  Lab done on July 23, 2018 and October 31, 2018   discussed result with patient   hemoccults not done  Patient does not want to do them        Review of Systems   Constitutional: Negative for chills, diaphoresis and fatigue  HENT: Negative for ear pain, sore throat, trouble swallowing and voice change  Eyes: Negative for visual disturbance  Respiratory: Negative for cough, chest tightness and shortness of breath  Cardiovascular: Negative for chest pain, palpitations and leg swelling  Gastrointestinal: Negative for abdominal pain, blood in stool, constipation, diarrhea and nausea  Endocrine: Negative for polydipsia and polyuria     Genitourinary: Negative for dysuria, flank pain, frequency, hematuria, pelvic pain, urgency, vaginal bleeding and vaginal discharge  Musculoskeletal: Negative for arthralgias, back pain, gait problem, myalgias and neck pain  Neurological: Negative for dizziness, tremors, seizures, weakness, light-headedness, numbness and headaches  Hematological: Negative for adenopathy  Does not bruise/bleed easily  Psychiatric/Behavioral: Negative for confusion  Objective:     Physical Exam   Constitutional: She is oriented to person, place, and time  She appears well-developed and well-nourished  No distress  HENT:   Head: Normocephalic  Eyes: Pupils are equal, round, and reactive to light  Conjunctivae and EOM are normal  No scleral icterus  Neck: No thyromegaly present  Cardiovascular: Normal rate and regular rhythm  Pulmonary/Chest: Effort normal and breath sounds normal  She has no wheezes  Abdominal: Soft  She exhibits no mass  There is no tenderness  There is no rebound and no guarding  No hernia  Lymphadenopathy:     She has no cervical adenopathy  Neurological: She is alert and oriented to person, place, and time  No cranial nerve deficit  She exhibits normal muscle tone  Coordination normal    Skin: No rash noted  No erythema  Psychiatric: She has a normal mood and affect   Her behavior is normal  Judgment and thought content normal

## 2018-11-26 DIAGNOSIS — E61.1 IRON DEFICIENCY: ICD-10-CM

## 2018-11-26 RX ORDER — FERROUS SULFATE 325(65) MG
1 TABLET ORAL
Qty: 30 TABLET | Refills: 2 | Status: SHIPPED | OUTPATIENT
Start: 2018-11-26 | End: 2020-08-11

## 2018-12-17 ENCOUNTER — CLINICAL SUPPORT (OUTPATIENT)
Dept: OBGYN CLINIC | Facility: CLINIC | Age: 23
End: 2018-12-17
Payer: COMMERCIAL

## 2018-12-17 DIAGNOSIS — Z23 NEED FOR HPV VACCINE: Primary | ICD-10-CM

## 2018-12-17 PROCEDURE — 90651 9VHPV VACCINE 2/3 DOSE IM: CPT

## 2018-12-17 PROCEDURE — 90471 IMMUNIZATION ADMIN: CPT

## 2019-01-02 ENCOUNTER — APPOINTMENT (OUTPATIENT)
Dept: LAB | Age: 24
End: 2019-01-02
Payer: COMMERCIAL

## 2019-01-02 DIAGNOSIS — E61.1 IRON DEFICIENCY: ICD-10-CM

## 2019-01-02 DIAGNOSIS — R63.5 WEIGHT GAIN: ICD-10-CM

## 2019-01-02 LAB
ALBUMIN SERPL BCP-MCNC: 3.6 G/DL (ref 3.5–5)
ALP SERPL-CCNC: 52 U/L (ref 46–116)
ALT SERPL W P-5'-P-CCNC: 26 U/L (ref 12–78)
ANION GAP SERPL CALCULATED.3IONS-SCNC: 8 MMOL/L (ref 4–13)
AST SERPL W P-5'-P-CCNC: 18 U/L (ref 5–45)
BASOPHILS # BLD AUTO: 0.03 THOUSANDS/ΜL (ref 0–0.1)
BASOPHILS NFR BLD AUTO: 0 % (ref 0–1)
BILIRUB SERPL-MCNC: 0.2 MG/DL (ref 0.2–1)
BUN SERPL-MCNC: 10 MG/DL (ref 5–25)
CALCIUM SERPL-MCNC: 8.8 MG/DL (ref 8.3–10.1)
CHLORIDE SERPL-SCNC: 107 MMOL/L (ref 100–108)
CHOLEST SERPL-MCNC: 168 MG/DL (ref 50–200)
CO2 SERPL-SCNC: 23 MMOL/L (ref 21–32)
CREAT SERPL-MCNC: 0.76 MG/DL (ref 0.6–1.3)
EOSINOPHIL # BLD AUTO: 0.11 THOUSAND/ΜL (ref 0–0.61)
EOSINOPHIL NFR BLD AUTO: 1 % (ref 0–6)
ERYTHROCYTE [DISTWIDTH] IN BLOOD BY AUTOMATED COUNT: 13.4 % (ref 11.6–15.1)
GFR SERPL CREATININE-BSD FRML MDRD: 111 ML/MIN/1.73SQ M
GLUCOSE P FAST SERPL-MCNC: 86 MG/DL (ref 65–99)
HCT VFR BLD AUTO: 41.8 % (ref 34.8–46.1)
HDLC SERPL-MCNC: 49 MG/DL (ref 40–60)
HGB BLD-MCNC: 13.2 G/DL (ref 11.5–15.4)
IMM GRANULOCYTES # BLD AUTO: 0.04 THOUSAND/UL (ref 0–0.2)
IMM GRANULOCYTES NFR BLD AUTO: 0 % (ref 0–2)
LDLC SERPL CALC-MCNC: 95 MG/DL (ref 0–100)
LYMPHOCYTES # BLD AUTO: 2.24 THOUSANDS/ΜL (ref 0.6–4.47)
LYMPHOCYTES NFR BLD AUTO: 22 % (ref 14–44)
MCH RBC QN AUTO: 26.1 PG (ref 26.8–34.3)
MCHC RBC AUTO-ENTMCNC: 31.6 G/DL (ref 31.4–37.4)
MCV RBC AUTO: 83 FL (ref 82–98)
MONOCYTES # BLD AUTO: 0.6 THOUSAND/ΜL (ref 0.17–1.22)
MONOCYTES NFR BLD AUTO: 6 % (ref 4–12)
NEUTROPHILS # BLD AUTO: 7.18 THOUSANDS/ΜL (ref 1.85–7.62)
NEUTS SEG NFR BLD AUTO: 71 % (ref 43–75)
NRBC BLD AUTO-RTO: 0 /100 WBCS
PLATELET # BLD AUTO: 290 THOUSANDS/UL (ref 149–390)
PMV BLD AUTO: 10.5 FL (ref 8.9–12.7)
POTASSIUM SERPL-SCNC: 4.3 MMOL/L (ref 3.5–5.3)
PROT SERPL-MCNC: 7.3 G/DL (ref 6.4–8.2)
RBC # BLD AUTO: 5.06 MILLION/UL (ref 3.81–5.12)
SODIUM SERPL-SCNC: 138 MMOL/L (ref 136–145)
TRIGL SERPL-MCNC: 122 MG/DL
TSH SERPL DL<=0.05 MIU/L-ACNC: 2.09 UIU/ML (ref 0.36–3.74)
WBC # BLD AUTO: 10.2 THOUSAND/UL (ref 4.31–10.16)

## 2019-01-02 PROCEDURE — 36415 COLL VENOUS BLD VENIPUNCTURE: CPT

## 2019-01-02 PROCEDURE — 80053 COMPREHEN METABOLIC PANEL: CPT

## 2019-01-02 PROCEDURE — 85025 COMPLETE CBC W/AUTO DIFF WBC: CPT

## 2019-01-02 PROCEDURE — 80061 LIPID PANEL: CPT

## 2019-01-02 PROCEDURE — 84443 ASSAY THYROID STIM HORMONE: CPT

## 2019-01-03 ENCOUNTER — OFFICE VISIT (OUTPATIENT)
Dept: FAMILY MEDICINE CLINIC | Facility: CLINIC | Age: 24
End: 2019-01-03
Payer: COMMERCIAL

## 2019-01-03 VITALS
DIASTOLIC BLOOD PRESSURE: 70 MMHG | WEIGHT: 209.2 LBS | RESPIRATION RATE: 20 BRPM | TEMPERATURE: 97.8 F | BODY MASS INDEX: 33.77 KG/M2 | OXYGEN SATURATION: 98 % | SYSTOLIC BLOOD PRESSURE: 102 MMHG | HEART RATE: 87 BPM

## 2019-01-03 DIAGNOSIS — E66.9 CLASS 1 OBESITY WITHOUT SERIOUS COMORBIDITY WITH BODY MASS INDEX (BMI) OF 33.0 TO 33.9 IN ADULT, UNSPECIFIED OBESITY TYPE: ICD-10-CM

## 2019-01-03 DIAGNOSIS — F17.200 SMOKING: ICD-10-CM

## 2019-01-03 DIAGNOSIS — J06.9 UPPER RESPIRATORY TRACT INFECTION, UNSPECIFIED TYPE: ICD-10-CM

## 2019-01-03 DIAGNOSIS — E61.1 IRON DEFICIENCY: Primary | ICD-10-CM

## 2019-01-03 DIAGNOSIS — D72.829 LEUKOCYTOSIS, UNSPECIFIED TYPE: ICD-10-CM

## 2019-01-03 PROCEDURE — 99214 OFFICE O/P EST MOD 30 MIN: CPT | Performed by: FAMILY MEDICINE

## 2019-01-03 NOTE — PROGRESS NOTES
Assessment/Plan:          Diagnoses and all orders for this visit:    Iron deficiency  Comments:  Improved  Discussed to take 1 iron supplement daily started 2 days before her menstrual period and through her menstrual period  Orders:  -     CBC and differential; Future    Smoking  Comments:   patient about smoking for 5 min  Discussed potential complication  Advised patient to stop smoking    Upper respiratory tract infection, unspecified type  Comments:  Improving  Patient to call if any further problem  Discussed no need for antibiotic    Leukocytosis, unspecified type  Comments:  Most likely secondary to her recent upper respiratory infection  Recheck CBC in 1-2 months  Orders:  -     CBC and differential; Future    Class 1 obesity without serious comorbidity with body mass index (BMI) of 33 0 to 33 9 in adult, unspecified obesity type  Comments:  Consel patient about medications  Patient declined medication  Diet and exercise discussed            Subjective:     Patient ID: Dima Bagley is a 21 y o  female      New complaint  Cold symptoms  Patient started with clear runny nose,  dry cough , started 1 week ago  Symptoms are mild, off and on  Overall are improving  Denied fever, chills, sore throat  Patient smokes hooka    Follow-up  Low iron  Patient denied dizziness  Rectal bleeding  Taking iron supplement 1 tablet every day  Luke Harper Her menstrual period little heavy but stable for her  Obesity  Patient stated she does not eat a lot     Does not exceed 1500 calorie a day Had been exercising 3-4 times a week for 1 hr  And she is not able to lose weight  Denied fatigue, hair loss or cold intolerance          Review of Systems   Constitutional: Negative for chills, diaphoresis and fatigue  HENT: Negative for ear pain, sore throat, trouble swallowing and voice change  Eyes: Negative for visual disturbance  Respiratory: Negative for cough, chest tightness and shortness of breath  Cardiovascular: Negative for chest pain, palpitations and leg swelling  Gastrointestinal: Negative for abdominal pain, blood in stool, constipation, diarrhea and nausea  Endocrine: Negative for polydipsia and polyuria  Genitourinary: Negative for dysuria, flank pain, frequency, hematuria, pelvic pain, urgency, vaginal bleeding and vaginal discharge  Musculoskeletal: Negative for arthralgias, back pain, gait problem, myalgias and neck pain  Neurological: Negative for dizziness, tremors, seizures, weakness, light-headedness, numbness and headaches  Hematological: Negative for adenopathy  Does not bruise/bleed easily  Psychiatric/Behavioral: Negative for confusion  Objective:     Physical Exam   Constitutional: She appears well-developed and well-nourished  HENT:   Head: Normocephalic  Right Ear: External ear normal    Left Ear: External ear normal    Nose: Nose normal    Mouth/Throat: Oropharynx is clear and moist  No oropharyngeal exudate  Tympanic membranes are normal bilaterally   Eyes: Pupils are equal, round, and reactive to light  Conjunctivae are normal  No scleral icterus  Neck: Normal range of motion  Neck supple  No JVD present  Cardiovascular: Normal rate, regular rhythm, normal heart sounds and intact distal pulses  Pulmonary/Chest: Effort normal and breath sounds normal  No respiratory distress  She has no wheezes  She has no rales  Abdominal: Soft  Bowel sounds are normal  She exhibits no mass  There is no tenderness  There is no rebound  Musculoskeletal: She exhibits no edema  Lymphadenopathy:     She has no cervical adenopathy  Neurological: Coordination normal    Skin: No rash noted  Psychiatric: She has a normal mood and affect   Her behavior is normal  Thought content normal

## 2019-01-04 ENCOUNTER — OFFICE VISIT (OUTPATIENT)
Dept: OBGYN CLINIC | Facility: CLINIC | Age: 24
End: 2019-01-04
Payer: COMMERCIAL

## 2019-01-04 ENCOUNTER — ULTRASOUND (OUTPATIENT)
Dept: OBGYN CLINIC | Facility: CLINIC | Age: 24
End: 2019-01-04
Payer: COMMERCIAL

## 2019-01-04 VITALS — SYSTOLIC BLOOD PRESSURE: 120 MMHG | BODY MASS INDEX: 35.09 KG/M2 | DIASTOLIC BLOOD PRESSURE: 80 MMHG | WEIGHT: 217.4 LBS

## 2019-01-04 DIAGNOSIS — R10.2 PELVIC PAIN IN FEMALE: Primary | ICD-10-CM

## 2019-01-04 DIAGNOSIS — N89.8 VAGINAL ODOR: ICD-10-CM

## 2019-01-04 DIAGNOSIS — R10.2 PELVIC PAIN: Primary | ICD-10-CM

## 2019-01-04 LAB — SL AMB POCT WET MOUNT: NORMAL

## 2019-01-04 PROCEDURE — 87210 SMEAR WET MOUNT SALINE/INK: CPT | Performed by: OBSTETRICS & GYNECOLOGY

## 2019-01-04 PROCEDURE — 99214 OFFICE O/P EST MOD 30 MIN: CPT | Performed by: OBSTETRICS & GYNECOLOGY

## 2019-01-04 PROCEDURE — 76830 TRANSVAGINAL US NON-OB: CPT | Performed by: OBSTETRICS & GYNECOLOGY

## 2019-01-04 PROCEDURE — 76856 US EXAM PELVIC COMPLETE: CPT | Performed by: OBSTETRICS & GYNECOLOGY

## 2019-01-04 NOTE — PROGRESS NOTES
AMB US Pelvic Non OB  Date/Time: 1/4/2019 9:35 AM  Performed by: Iris Pulse by: Fanny Gross     Procedure details:     Indications: non-obstetric abdominal pain      Technique:  Transvaginal US, Non-OB  Uterine findings:     Diameter (mm):  73    Length (mm):  48    Width (mm):  51    Uterine adhesions: not identified      Adnexal mass: not identified      Polyps: not identified      Myomas: not identified      Endometrial stripe: identified      Endometrial hyperplasia: not identified      Endometrium thickness (mm):  9  Left ovary findings:     Left ovary:  Visualized    Diameter (mm):  29    Length (mm):  22    Width (mm):  22  Right ovary findings:     Right ovary:  Visualized    Diameter (mm):  31    Length (mm):  29    Width (mm):  29  Other findings:     Free pelvic fluid: not identified      Free peritoneal fluid: not identified    Post-Procedure Details:     Impression:  No cyst seen    Tolerance:   Tolerated well, no immediate complications

## 2019-08-08 ENCOUNTER — OFFICE VISIT (OUTPATIENT)
Dept: FAMILY MEDICINE CLINIC | Facility: CLINIC | Age: 24
End: 2019-08-08
Payer: COMMERCIAL

## 2019-08-08 ENCOUNTER — APPOINTMENT (OUTPATIENT)
Dept: LAB | Facility: CLINIC | Age: 24
End: 2019-08-08
Payer: COMMERCIAL

## 2019-08-08 VITALS
OXYGEN SATURATION: 97 % | BODY MASS INDEX: 32.5 KG/M2 | SYSTOLIC BLOOD PRESSURE: 118 MMHG | HEART RATE: 72 BPM | RESPIRATION RATE: 16 BRPM | TEMPERATURE: 98.5 F | DIASTOLIC BLOOD PRESSURE: 80 MMHG | HEIGHT: 66 IN | WEIGHT: 202.2 LBS

## 2019-08-08 DIAGNOSIS — E61.1 IRON DEFICIENCY: ICD-10-CM

## 2019-08-08 DIAGNOSIS — D72.829 LEUKOCYTOSIS, UNSPECIFIED TYPE: ICD-10-CM

## 2019-08-08 DIAGNOSIS — L65.9 HAIR LOSS: ICD-10-CM

## 2019-08-08 DIAGNOSIS — E66.9 CLASS 1 OBESITY WITHOUT SERIOUS COMORBIDITY WITH BODY MASS INDEX (BMI) OF 32.0 TO 32.9 IN ADULT, UNSPECIFIED OBESITY TYPE: ICD-10-CM

## 2019-08-08 DIAGNOSIS — L40.9 PSORIASIS: ICD-10-CM

## 2019-08-08 DIAGNOSIS — L65.9 HAIR LOSS: Primary | ICD-10-CM

## 2019-08-08 DIAGNOSIS — L30.9 DERMATITIS: ICD-10-CM

## 2019-08-08 LAB
ALBUMIN SERPL BCP-MCNC: 3.7 G/DL (ref 3.5–5)
ALP SERPL-CCNC: 55 U/L (ref 46–116)
ALT SERPL W P-5'-P-CCNC: 26 U/L (ref 12–78)
ANION GAP SERPL CALCULATED.3IONS-SCNC: 8 MMOL/L (ref 4–13)
AST SERPL W P-5'-P-CCNC: 25 U/L (ref 5–45)
BASOPHILS # BLD AUTO: 0.03 THOUSANDS/ΜL (ref 0–0.1)
BASOPHILS NFR BLD AUTO: 0 % (ref 0–1)
BILIRUB SERPL-MCNC: 0.34 MG/DL (ref 0.2–1)
BUN SERPL-MCNC: 8 MG/DL (ref 5–25)
CALCIUM SERPL-MCNC: 8.7 MG/DL (ref 8.3–10.1)
CHLORIDE SERPL-SCNC: 106 MMOL/L (ref 100–108)
CO2 SERPL-SCNC: 22 MMOL/L (ref 21–32)
CREAT SERPL-MCNC: 0.67 MG/DL (ref 0.6–1.3)
EOSINOPHIL # BLD AUTO: 0.13 THOUSAND/ΜL (ref 0–0.61)
EOSINOPHIL NFR BLD AUTO: 2 % (ref 0–6)
ERYTHROCYTE [DISTWIDTH] IN BLOOD BY AUTOMATED COUNT: 13.3 % (ref 11.6–15.1)
FERRITIN SERPL-MCNC: 38 NG/ML (ref 8–388)
FOLATE SERPL-MCNC: 14.8 NG/ML (ref 3.1–17.5)
GFR SERPL CREATININE-BSD FRML MDRD: 123 ML/MIN/1.73SQ M
GLUCOSE P FAST SERPL-MCNC: 97 MG/DL (ref 65–99)
HCT VFR BLD AUTO: 42.4 % (ref 34.8–46.1)
HGB BLD-MCNC: 13.3 G/DL (ref 11.5–15.4)
IMM GRANULOCYTES # BLD AUTO: 0.04 THOUSAND/UL (ref 0–0.2)
IMM GRANULOCYTES NFR BLD AUTO: 1 % (ref 0–2)
IRON SATN MFR SERPL: 16 %
IRON SERPL-MCNC: 55 UG/DL (ref 50–170)
LYMPHOCYTES # BLD AUTO: 1.95 THOUSANDS/ΜL (ref 0.6–4.47)
LYMPHOCYTES NFR BLD AUTO: 23 % (ref 14–44)
MCH RBC QN AUTO: 25.8 PG (ref 26.8–34.3)
MCHC RBC AUTO-ENTMCNC: 31.4 G/DL (ref 31.4–37.4)
MCV RBC AUTO: 82 FL (ref 82–98)
MONOCYTES # BLD AUTO: 0.62 THOUSAND/ΜL (ref 0.17–1.22)
MONOCYTES NFR BLD AUTO: 7 % (ref 4–12)
NEUTROPHILS # BLD AUTO: 5.69 THOUSANDS/ΜL (ref 1.85–7.62)
NEUTS SEG NFR BLD AUTO: 67 % (ref 43–75)
NRBC BLD AUTO-RTO: 0 /100 WBCS
PLATELET # BLD AUTO: 270 THOUSANDS/UL (ref 149–390)
PMV BLD AUTO: 11 FL (ref 8.9–12.7)
POTASSIUM SERPL-SCNC: 4 MMOL/L (ref 3.5–5.3)
PROT SERPL-MCNC: 7.5 G/DL (ref 6.4–8.2)
RBC # BLD AUTO: 5.15 MILLION/UL (ref 3.81–5.12)
SODIUM SERPL-SCNC: 136 MMOL/L (ref 136–145)
TIBC SERPL-MCNC: 350 UG/DL (ref 250–450)
VIT B12 SERPL-MCNC: 284 PG/ML (ref 100–900)
WBC # BLD AUTO: 8.46 THOUSAND/UL (ref 4.31–10.16)

## 2019-08-08 PROCEDURE — 83540 ASSAY OF IRON: CPT

## 2019-08-08 PROCEDURE — 3008F BODY MASS INDEX DOCD: CPT | Performed by: FAMILY MEDICINE

## 2019-08-08 PROCEDURE — 80053 COMPREHEN METABOLIC PANEL: CPT

## 2019-08-08 PROCEDURE — 82746 ASSAY OF FOLIC ACID SERUM: CPT

## 2019-08-08 PROCEDURE — 86038 ANTINUCLEAR ANTIBODIES: CPT

## 2019-08-08 PROCEDURE — 86592 SYPHILIS TEST NON-TREP QUAL: CPT

## 2019-08-08 PROCEDURE — 84403 ASSAY OF TOTAL TESTOSTERONE: CPT

## 2019-08-08 PROCEDURE — 82627 DEHYDROEPIANDROSTERONE: CPT

## 2019-08-08 PROCEDURE — 82728 ASSAY OF FERRITIN: CPT

## 2019-08-08 PROCEDURE — 84402 ASSAY OF FREE TESTOSTERONE: CPT

## 2019-08-08 PROCEDURE — 99214 OFFICE O/P EST MOD 30 MIN: CPT | Performed by: FAMILY MEDICINE

## 2019-08-08 PROCEDURE — 85025 COMPLETE CBC W/AUTO DIFF WBC: CPT

## 2019-08-08 PROCEDURE — 36415 COLL VENOUS BLD VENIPUNCTURE: CPT

## 2019-08-08 PROCEDURE — 82607 VITAMIN B-12: CPT

## 2019-08-08 PROCEDURE — 83550 IRON BINDING TEST: CPT

## 2019-08-08 RX ORDER — CLOBETASOL PROPIONATE 0.5 MG/G
CREAM TOPICAL 2 TIMES DAILY
Qty: 30 G | Refills: 0 | Status: SHIPPED | OUTPATIENT
Start: 2019-08-08 | End: 2020-08-11

## 2019-08-08 RX ORDER — CLOTRIMAZOLE AND BETAMETHASONE DIPROPIONATE 10; .64 MG/G; MG/G
CREAM TOPICAL 2 TIMES DAILY
Qty: 30 G | Refills: 0 | Status: SHIPPED | OUTPATIENT
Start: 2019-08-08 | End: 2020-08-11

## 2019-08-08 NOTE — PROGRESS NOTES
Assessment/Plan:          Diagnoses and all orders for this visit:    Hair loss  Comments: To consider referral to Dermatology after blood work done  Orders:  -     EFRAIN Screen w/ Reflex to Titer/Pattern; Future  -     CBC and differential; Future  -     Comprehensive metabolic panel; Future  -     DHEA-sulfate; Future  -     Folate; Future  -     Testosterone, Free+Total LC/MS; Future  -     Vitamin B12; Future  -     RPR; Future  -     Iron Saturation %; Future  -     TIBC; Future  -     Ferritin; Future  -     Iron; Future    Dermatitis  Comments:   avoid using deodorant  Keep area clean and dry  to call if any further problem  Orders:  -     clotrimazole-betamethasone (LOTRISONE) 1-0 05 % cream; Apply topically 2 (two) times a day    Leukocytosis, unspecified type  -     CBC and differential; Future    Psoriasis  -     clobetasol (TEMOVATE) 0 05 % cream; Apply topically 2 (two) times a day    Class 1 obesity without serious comorbidity with body mass index (BMI) of 32 0 to 32 9 in adult, unspecified obesity type  Comments:   encouraged patient to lose weight  Consel patient    Iron deficiency  -     CBC and differential; Future            Subjective:     Patient ID: Selin Wharton is a 25 y o  female       New complaint  Rash  Patient stated she complain from  Change in the color of the skin and itching under both axilla for the last 2-3 weeks  Patient does use deodorant  Symptoms stable  Hair loss  Patient stated [place] 2[place]3 years [place] she noticed her head she is losing hair more than usual ,   Mild ,denied itching of the scalp denied lesion  Patient   Does color her hair  chronic medical problem  Iron deficiency anemia  Patient stated taking 1 iron tablet only through her     Denied fatigue, shortness of breath or  Dizziness  Obesity  Patient stated she has been going to the gym 3 times a week for 1 hour  And also has been eating healthier    And stopped eating bread  She lost few lbs  She patient stated never her weight was 217 as mentioned with last office visit this is a mistake  psoriasis overall well controlled occasionally gets a little small patches on her buttocks and request to refill temovate  to keep it on hand  No test done since last office visit        Review of Systems   Constitutional: Negative for activity change, appetite change, chills, fatigue, fever and unexpected weight change  HENT: Negative for congestion, ear discharge, ear pain, hearing loss, nosebleeds, rhinorrhea, sinus pressure, sore throat, tinnitus, trouble swallowing and voice change  Eyes: Negative for photophobia, pain and visual disturbance  Respiratory: Negative for cough, chest tightness, shortness of breath and wheezing  Cardiovascular: Negative for chest pain, palpitations and leg swelling  Gastrointestinal: Negative for abdominal pain, anal bleeding, blood in stool, constipation, diarrhea, nausea and vomiting  Endocrine: Negative for cold intolerance, heat intolerance, polydipsia and polyuria  Genitourinary: Negative for dysuria, frequency, hematuria and urgency  Musculoskeletal: Negative for arthralgias, back pain, gait problem, joint swelling, myalgias and neck pain  Neurological: Negative for dizziness, tremors, seizures, syncope, weakness, light-headedness and headaches  Hematological: Negative for adenopathy  Does not bruise/bleed easily  Psychiatric/Behavioral: Negative for agitation, behavioral problems, confusion, dysphoric mood, hallucinations and sleep disturbance  The patient is not nervous/anxious  Objective:     Physical Exam   Constitutional: She is oriented to person, place, and time  She appears well-developed and well-nourished  No distress  HENT:   Head: Normocephalic and atraumatic  Eyes: Pupils are equal, round, and reactive to light  Conjunctivae and EOM are normal  No scleral icterus  Neck: Neck supple  No JVD present  No thyromegaly present  Cardiovascular: Normal rate, regular rhythm and normal heart sounds  No murmur heard  Pulses:       Carotid pulses are 3+ on the right side, and 3+ on the left side  Extremities  No edema   Pulmonary/Chest: Effort normal and breath sounds normal    Abdominal: Soft  Bowel sounds are normal  She exhibits no distension and no mass  There is no tenderness  There is no rebound and no guarding  No hernia  Musculoskeletal:   Under both axilla there is a tannish pigmentation bilaterally  Skull  There is no lesion  She has normal hair  And no obvious alopecia   Lymphadenopathy:     She has no cervical adenopathy  Neurological: She is alert and oriented to person, place, and time  No cranial nerve deficit  She exhibits normal muscle tone  Coordination normal    Skin: No rash noted  Psychiatric: She has a normal mood and affect  Her behavior is normal  Judgment and thought content normal    BMI Counseling: Body mass index is 32 64 kg/m²  Discussed the patient's BMI with her  The BMI is above average  BMI counseling and education was provided to the patient  Nutrition recommendations include reducing portion sizes

## 2019-08-09 LAB
DHEA-S SERPL-MCNC: 589.8 UG/DL (ref 110–431.7)
RPR SER QL: NORMAL

## 2019-08-10 LAB
TESTOST FREE SERPL-MCNC: 3.3 PG/ML (ref 0–4.2)
TESTOST SERPL-MCNC: 50 NG/DL (ref 8–48)

## 2019-08-13 LAB — RYE IGE QN: NEGATIVE

## 2019-09-12 ENCOUNTER — TELEPHONE (OUTPATIENT)
Dept: FAMILY MEDICINE CLINIC | Facility: CLINIC | Age: 24
End: 2019-09-12

## 2019-09-12 DIAGNOSIS — R79.89 HIGH SERUM TESTOSTERONE: Primary | ICD-10-CM

## 2019-09-12 NOTE — TELEPHONE ENCOUNTER
----- Message from Francis Bourne MD sent at 9/10/2019  2:10 PM EDT -----  Labs all normal except slight elevation in total testosterone and DHEA  Check pelvic ultrasound  And if continue to lose hair refer her to Dermatology    Keep office visit for follow-up

## 2019-09-12 NOTE — TELEPHONE ENCOUNTER
No order for ultrasound put in the system I do not find it can you please put this order in for the patient thanks

## 2019-09-13 ENCOUNTER — TELEPHONE (OUTPATIENT)
Dept: FAMILY MEDICINE CLINIC | Facility: CLINIC | Age: 24
End: 2019-09-13

## 2019-10-28 ENCOUNTER — HOSPITAL ENCOUNTER (OUTPATIENT)
Dept: ULTRASOUND IMAGING | Facility: HOSPITAL | Age: 24
Discharge: HOME | End: 2019-10-28
Attending: FAMILY MEDICINE
Payer: COMMERCIAL

## 2019-10-28 DIAGNOSIS — R79.89 HIGH SERUM TESTOSTERONE: ICD-10-CM

## 2019-10-28 PROCEDURE — 76856 US EXAM PELVIC COMPLETE: CPT

## 2019-10-28 PROCEDURE — 76830 TRANSVAGINAL US NON-OB: CPT

## 2019-11-25 ENCOUNTER — OFFICE VISIT (OUTPATIENT)
Dept: FAMILY MEDICINE CLINIC | Facility: CLINIC | Age: 24
End: 2019-11-25
Payer: COMMERCIAL

## 2019-11-25 VITALS
TEMPERATURE: 99.2 F | DIASTOLIC BLOOD PRESSURE: 78 MMHG | WEIGHT: 196.6 LBS | SYSTOLIC BLOOD PRESSURE: 123 MMHG | BODY MASS INDEX: 31.6 KG/M2 | HEART RATE: 76 BPM | OXYGEN SATURATION: 99 % | HEIGHT: 66 IN

## 2019-11-25 DIAGNOSIS — R42 VERTIGO: Primary | ICD-10-CM

## 2019-11-25 DIAGNOSIS — R79.89 HIGH SERUM TESTOSTERONE: ICD-10-CM

## 2019-11-25 DIAGNOSIS — N92.6 MENSTRUAL PERIODS, ABNORMAL: ICD-10-CM

## 2019-11-25 DIAGNOSIS — L65.9 HAIR LOSS: ICD-10-CM

## 2019-11-25 DIAGNOSIS — R79.89 ELEVATED DHEA: ICD-10-CM

## 2019-11-25 DIAGNOSIS — E61.1 IRON DEFICIENCY: ICD-10-CM

## 2019-11-25 DIAGNOSIS — Z28.21 IMMUNIZATION NOT CARRIED OUT BECAUSE OF PATIENT REFUSAL: ICD-10-CM

## 2019-11-25 DIAGNOSIS — D72.829 LEUKOCYTOSIS, UNSPECIFIED TYPE: ICD-10-CM

## 2019-11-25 DIAGNOSIS — F17.200 SMOKING: ICD-10-CM

## 2019-11-25 DIAGNOSIS — Z53.20 HIV SCREENING DECLINED: ICD-10-CM

## 2019-11-25 PROBLEM — IMO0001 SMOKING: Status: ACTIVE | Noted: 2019-11-25

## 2019-11-25 PROCEDURE — 99214 OFFICE O/P EST MOD 30 MIN: CPT | Performed by: FAMILY MEDICINE

## 2019-11-25 NOTE — PROGRESS NOTES
The Assessment/Plan:          Diagnoses and all orders for this visit:    Vertigo  Comments:  Improving most likely patient has benign vertigo  Discussed using Antivert  Patient decline  Advised to call if any further problem    Hair loss  -     Ambulatory referral to Dermatology; Future  -     Methylmalonic acid, serum; Future    High serum testosterone  -     Testosterone, Free+Total LC/MS; Future    Elevated DHEA (HCC)  -     DHEA-sulfate; Future    Iron deficiency  Comments:  Corrected    Smoking  Comments:  Again advised to stop smoking discussed complication of smoking including heart disease, stroke, COPD and cancer    Leukocytosis, unspecified type  Comments:  Most likely secondary refused to smoking  Advised to stop smoking  Orders:  -     CBC and differential; Future    Menstrual periods, abnormal  Comments:  check urne pregnancy , pt declined  to follow with GYN    Immunization not carried out because of patient refusal  Comments:  Had flu shot , Tdap and prevnar    HIV screening declined            Subjective:     Patient ID: Judy Bonilla is a 25 y o  female        Patient is here for follow-up    Hair loss  Patient continued to have hair loss mild diffuse  Denied skin lesion or itching of the hair  Patient does apply dime to her hair  Anemia  Denied melena or rectal bleeding denied fatigue or shortness of breath  New complaint  Dizziness  Patient stated today when she woke up in the morning and she get up off bed she felt little bed the room is spinning around her  Had been improving was mild  Denied any hearing denied loss of vision, slurred speech, weakness or numbness  Had similar symptoms 1 time last year  Patient denied earache  Denied head injury  She said the last 2 menstrual   They changed in pattern  Usually she gets her  For 5 days the 1st 3 days she has heavy blow  And then last 2 days she has mild flow  However in the last 2  She had heavy flow for 2 days and her    Slow on the 3rd day and finish denied pelvic pain  Patient made an appointment with her gyn next month      Test results lab done August 8, 2019  Pelvic ultrasound  Discussed result with patient      Review of Systems   Constitutional: Negative for activity change, appetite change, chills, fatigue, fever and unexpected weight change  HENT: Negative for congestion, ear discharge, ear pain, hearing loss, nosebleeds, rhinorrhea, sinus pressure, sore throat, tinnitus, trouble swallowing and voice change  Eyes: Negative for photophobia, pain and visual disturbance  Respiratory: Negative for cough, chest tightness, shortness of breath and wheezing  Cardiovascular: Negative for chest pain, palpitations and leg swelling  Gastrointestinal: Negative for abdominal pain, anal bleeding, blood in stool, constipation, diarrhea, nausea and vomiting  Endocrine: Negative for cold intolerance, heat intolerance, polydipsia and polyuria  Genitourinary: Negative for dysuria, frequency, hematuria and urgency  Musculoskeletal: Negative for arthralgias, back pain, gait problem, joint swelling, myalgias and neck pain  Skin: Negative for rash  Neurological: Negative for dizziness, tremors, seizures, syncope, weakness, light-headedness and headaches  Hematological: Negative for adenopathy  Does not bruise/bleed easily  Psychiatric/Behavioral: Negative for agitation, behavioral problems, confusion, dysphoric mood, hallucinations and sleep disturbance  The patient is not nervous/anxious  Objective:     Physical Exam   Constitutional: She is oriented to person, place, and time  She appears well-developed and well-nourished  No distress  HENT:   Head: Normocephalic and atraumatic  Right Ear: External ear normal    Left Ear: External ear normal    Nose: Nose normal    Mouth/Throat: Oropharynx is clear and moist  No oropharyngeal exudate  Tympanic membrane    Normal bilaterally   Eyes: Pupils are equal, round, and reactive to light  Conjunctivae and EOM are normal  No scleral icterus  Neck: Normal range of motion  Neck supple  No JVD present  No thyromegaly present  Cardiovascular: Normal rate, regular rhythm and normal heart sounds  No murmur heard  Pulses:       Carotid pulses are 3+ on the right side, and 3+ on the left side  Dorsalis pedis pulses are 3+ on the right side, and 3+ on the left side  Posterior tibial pulses are 3+ on the right side, and 3+ on the left side  Pulmonary/Chest: Effort normal and breath sounds normal  No stridor  No respiratory distress  She has no wheezes  She has no rales  Abdominal: Soft  Bowel sounds are normal  She exhibits no distension and no mass  There is no tenderness  There is no rebound and no guarding  No hernia  Musculoskeletal: Normal range of motion  She exhibits no edema or deformity  Lymphadenopathy:     She has no cervical adenopathy  Neurological: She is alert and oriented to person, place, and time  She displays normal reflexes  No cranial nerve deficit or sensory deficit  She exhibits normal muscle tone  Coordination normal    Gait is normal   Negative Babinski bilaterally  Negative Romberg  Skin: No rash noted  She is not diaphoretic  Psychiatric: She has a normal mood and affect   Her behavior is normal  Judgment and thought content normal

## 2019-11-26 PROBLEM — Z28.21 IMMUNIZATION NOT CARRIED OUT BECAUSE OF PATIENT REFUSAL: Status: ACTIVE | Noted: 2019-11-26

## 2019-11-26 PROBLEM — Z53.20 HIV SCREENING DECLINED: Status: ACTIVE | Noted: 2019-11-26

## 2020-03-26 ENCOUNTER — TELEPHONE (OUTPATIENT)
Dept: FAMILY MEDICINE CLINIC | Facility: CLINIC | Age: 25
End: 2020-03-26

## 2020-03-31 ENCOUNTER — TELEPHONE (OUTPATIENT)
Dept: FAMILY MEDICINE CLINIC | Facility: CLINIC | Age: 25
End: 2020-03-31

## 2020-03-31 NOTE — TELEPHONE ENCOUNTER
Left message for patient to return phone call regarding past due follow up appointment with Dr Maximiliano Cardona

## 2020-04-21 ENCOUNTER — TELEPHONE (OUTPATIENT)
Dept: FAMILY MEDICINE CLINIC | Facility: CLINIC | Age: 25
End: 2020-04-21

## 2020-04-30 ENCOUNTER — TELEPHONE (OUTPATIENT)
Dept: FAMILY MEDICINE CLINIC | Facility: CLINIC | Age: 25
End: 2020-04-30

## 2020-08-11 ENCOUNTER — ANNUAL EXAM (OUTPATIENT)
Dept: OBGYN CLINIC | Facility: CLINIC | Age: 25
End: 2020-08-11
Payer: COMMERCIAL

## 2020-08-11 VITALS
WEIGHT: 195 LBS | TEMPERATURE: 97.5 F | HEART RATE: 88 BPM | SYSTOLIC BLOOD PRESSURE: 120 MMHG | BODY MASS INDEX: 31.34 KG/M2 | HEIGHT: 66 IN | DIASTOLIC BLOOD PRESSURE: 80 MMHG

## 2020-08-11 DIAGNOSIS — Z72.3 INADEQUATE EXERCISE: ICD-10-CM

## 2020-08-11 DIAGNOSIS — Z01.419 ENCOUNTER FOR ANNUAL ROUTINE GYNECOLOGICAL EXAMINATION: Primary | ICD-10-CM

## 2020-08-11 DIAGNOSIS — E58 DIETARY CALCIUM DEFICIENCY: ICD-10-CM

## 2020-08-11 PROCEDURE — 3008F BODY MASS INDEX DOCD: CPT | Performed by: OBSTETRICS & GYNECOLOGY

## 2020-08-11 PROCEDURE — S0612 ANNUAL GYNECOLOGICAL EXAMINA: HCPCS | Performed by: OBSTETRICS & GYNECOLOGY

## 2020-08-11 RX ORDER — FERROUS SULFATE 325(65) MG
325 TABLET ORAL
COMMUNITY
End: 2021-09-09

## 2020-08-11 NOTE — PROGRESS NOTES
Pt is a 22 y o  Harden Ar with Patient's last menstrual period was 2020 (exact date)  using [facility] for [facility] presents for preventive care  She notes the same partner since her last STI evaluation  In her lifetime she has been involved with 1 partner   Safe sexual practices (monogomy, condoms) are followed consistently  · She does  feel safe in the relationship  She does feel safe in her home  · Her calcium intake encompasses cheese and yogurt for a total of 1 servings daily on average  She does not take additional Vitamin D (MVI or supplement)  · She exercises 0 times per week  · Her menses occur every 28  Days, last 5-6 days and require super tampons every 2-4 hours  Menstrual History:  OB History        0    Para   0    Term   0       0    AB   0    Living   0       SAB   0    TAB   0    Ectopic   0    Multiple   0    Live Births   0           Obstetric Comments   Menarche: 15    28/5-6/super tampon every 2-4 hours             Menarche age: 15  Patient's last menstrual period was 2020 (exact date)  ·      · She has completed the HPV vaccine series appropriate for age  · tobacco use : does use tobacco   Hookah  Advised to cease          · Colonoscopy/mammogram: not indicated  · Pap: 2018-wnl, repeat 1 year    Past Medical History:   Diagnosis Date    Anal itching     since childhood    Chicken pox     Cyst of fallopian tube 2017    Right side, 11 cm, benign    Generalized headaches     Need for HPV vaccination     completed series 2018    Psoriasis        Past Surgical History:   Procedure Laterality Date    NOSE SURGERY      Nasal Septum deviation repair, lazer    OR REMOVAL OF OVARIAN CYST(S) Right 3/1/2018    Procedure: LAPAROSCOPIC RIGHT SALPINGOCYSTECTOMY;  Surgeon: Francesca Trujillo MD;  Location: [facility];  Service: Gynecology       OB History    Para Term  AB Living   0 0 0 0 0 0   SAB TAB Ectopic Multiple Live Births   0 0 0 0 0   Obstetric Comments   Menarche: 13      28/5-6/super tampon every 2-4 hours             Current Outpatient Medications:     ferrous sulfate 325 (65 Fe) mg tablet, Take 325 mg by mouth daily with breakfast During menses, Disp: , Rfl:     Allergies   Allergen Reactions    No Known Allergies        Social History     Socioeconomic History    Marital status: /Civil Union     Spouse name: None    Number of children: 0    Years of education: college student    Highest education level: None   Occupational History    Occupation: student   Social Needs    Financial resource strain: None    Food insecurity     Worry: None     Inability: None    Transportation needs     Medical: None     Non-medical: None   Tobacco Use    Smoking status: Current Every Day Smoker    Smokeless tobacco: Current User    Tobacco comment: pt smokes hookah   Substance and Sexual Activity    Alcohol use: Yes     Comment: occasional    Drug use: No    Sexual activity: Yes     Partners: Male     Birth control/protection: Rhythm     Comment: lifetime partners: 1   Lifestyle    Physical activity     Days per week: None     Minutes per session: None    Stress: None   Relationships    Social connections     Talks on phone: None     Gets together: None     Attends Baptism service: None     Active member of club or organization: None     Attends meetings of clubs or organizations: None     Relationship status: None    Intimate partner violence     Fear of current or ex partner: None     Emotionally abused: None     Physically abused: None     Forced sexual activity: None   Other Topics Concern    None   Social History Narrative    College student - Electrical engineering    [place]    Hookah pipe smoker    Sedetary lifestyle    Accepts blood products        Exercise: none    Calcium: 1 serving cheese 3-4x  week, yogurt once/week       Family History   Problem Relation Age of Onset    Hyperlipidemia Mother     Heart disease Mother     Heart attack Father 36    Heart disease Father     Victoria's disease Maternal Grandmother     Breast cancer Neg Hx     Ovarian cancer Neg Hx     Colon cancer Neg Hx        Blood pressure 120/80, pulse 88, temperature 97 5 °F (36 4 °C), temperature source Tympanic, height 5' 6" (1 676 m), weight 88 5 kg (195 lb), last menstrual period 08/01/2020, not currently breastfeeding  and Body mass index is 31 47 kg/m²  Physical Exam  Constitutional:       Appearance: Normal appearance  She is well-developed  She is obese  HENT:      Head: Normocephalic and atraumatic  Eyes:      Conjunctiva/sclera: Conjunctivae normal    Neck:      Musculoskeletal: Normal range of motion and neck supple  Thyroid: No thyromegaly  Trachea: No tracheal deviation  Cardiovascular:      Rate and Rhythm: Normal rate and regular rhythm  Heart sounds: Normal heart sounds  Pulmonary:      Effort: Pulmonary effort is normal  No respiratory distress  Breath sounds: Normal breath sounds  No stridor  No wheezing or rales  Abdominal:      General: Bowel sounds are normal  There is no distension  Palpations: Abdomen is soft  There is no mass  Tenderness: There is no abdominal tenderness  There is no guarding or rebound  Musculoskeletal: Normal range of motion  General: No tenderness  Lymphadenopathy:      Cervical: No cervical adenopathy  Skin:     General: Skin is warm  Findings: No erythema or rash  Neurological:      Mental Status: She is alert and oriented to person, place, and time  Psychiatric:         Behavior: Behavior normal          Thought Content: Thought content normal          Judgment: Judgment normal          Breasts: breasts appear normal, no suspicious masses, no skin or nipple changes or axillary nodes, symmetric fibrous changes in both upper outer quadrants      vulva: normal external genitalia for age and no lesions, masses, epithelial changes, or exudate  vagina: color pink and rugae  well formed rugae  cervix: nullip and no lesions   uterus: NSSC, AF, NT, mobile  adnexa: no masses or tenderness      A/P:  Pt is a 22 y o  Toney Grise with      Diagnoses and all orders for this visit:    Encounter for annual routine gynecological examination  -pap up to date    Dietary calcium deficiency  Patient advised recommendation of daily dietary calcium of 1000 mg calcium  Inadequate exercise  Patient advised recommendation of exercise 5 times per week for 30 minutes  BMI 31 0-31 9,adult  Patient advised recommendation of BMI to be between 19-25

## 2021-03-10 ENCOUNTER — OFFICE VISIT (OUTPATIENT)
Dept: FAMILY MEDICINE CLINIC | Facility: CLINIC | Age: 26
End: 2021-03-10
Payer: COMMERCIAL

## 2021-03-10 VITALS
SYSTOLIC BLOOD PRESSURE: 126 MMHG | DIASTOLIC BLOOD PRESSURE: 72 MMHG | WEIGHT: 211.4 LBS | HEART RATE: 84 BPM | HEIGHT: 66 IN | OXYGEN SATURATION: 98 % | TEMPERATURE: 98.1 F | BODY MASS INDEX: 33.97 KG/M2

## 2021-03-10 DIAGNOSIS — R63.5 WEIGHT GAIN: ICD-10-CM

## 2021-03-10 DIAGNOSIS — F17.200 SMOKING: ICD-10-CM

## 2021-03-10 DIAGNOSIS — E66.9 OBESITY (BMI 30-39.9): ICD-10-CM

## 2021-03-10 DIAGNOSIS — N94.6 DYSMENORRHEA: ICD-10-CM

## 2021-03-10 DIAGNOSIS — R79.89 HIGH SERUM TESTOSTERONE: ICD-10-CM

## 2021-03-10 DIAGNOSIS — Z53.20 HIV SCREENING DECLINED: ICD-10-CM

## 2021-03-10 DIAGNOSIS — R79.89 ELEVATED DHEA: ICD-10-CM

## 2021-03-10 DIAGNOSIS — L40.9 PSORIASIS: ICD-10-CM

## 2021-03-10 DIAGNOSIS — Z71.85 IMMUNIZATION COUNSELING: ICD-10-CM

## 2021-03-10 DIAGNOSIS — Z00.00 WELL ADULT EXAM: Primary | ICD-10-CM

## 2021-03-10 DIAGNOSIS — D72.829 LEUKOCYTOSIS, UNSPECIFIED TYPE: ICD-10-CM

## 2021-03-10 PROCEDURE — 99214 OFFICE O/P EST MOD 30 MIN: CPT | Performed by: FAMILY MEDICINE

## 2021-03-10 PROCEDURE — 99395 PREV VISIT EST AGE 18-39: CPT | Performed by: FAMILY MEDICINE

## 2021-03-10 RX ORDER — FOLIC ACID 1 MG/1
TABLET ORAL DAILY
COMMUNITY
End: 2021-09-09

## 2021-03-10 RX ORDER — PSYLLIUM HUSK 0.4 G
CAPSULE ORAL
COMMUNITY
End: 2021-09-09

## 2021-03-10 RX ORDER — CLOBETASOL PROPIONATE 0.5 MG/G
CREAM TOPICAL 2 TIMES DAILY
Qty: 30 G | Refills: 0 | Status: SHIPPED | OUTPATIENT
Start: 2021-03-10 | End: 2021-09-09

## 2021-03-10 RX ORDER — NAPROXEN 500 MG/1
TABLET ORAL
Qty: 20 TABLET | Refills: 0 | Status: SHIPPED | OUTPATIENT
Start: 2021-03-10 | End: 2021-09-09

## 2021-03-10 NOTE — PROGRESS NOTES
Assessment/Plan:       No problem-specific Assessment & Plan notes found for this encounter  Diagnoses and all orders for this visit:    Well adult exam  Comments:  Advised to see Ophthalmology for routine eye care and the dentist for teeth care    Psoriasis  -     clobetasol (TEMOVATE) 0 05 % cream; Apply topically 2 (two) times a day  -     Ambulatory referral to Dermatology; Future    Dysmenorrhea  Comments:  GI side effect of Naprosyn discussed  Orders:  -     US pelvis complete non OB; Future  -     naproxen (NAPROSYN) 500 mg tablet; Take 1 tablet twice a day with food as needed p o  Weight gain  Comments:  Advised patient to start watching her diet and to exercise  Orders:  -     TSH, 3rd generation with Free T4 reflex; Future  -     Lipid Panel with Direct LDL reflex; Future    Smoking  Comments:  Consel patient about smoking advised to stop smoking  Patient stated she will try to stop smoking in 2 months after she finishes school    Obesity (BMI 30-39  9)  Comments:  Concel patient about weight loss  Orders:  -     Lipid Panel with Direct LDL reflex; Future    High serum testosterone  -     Testosterone, free, total; Future    Elevated DHEA (HCC)  -     DHEA; Future    Leukocytosis, unspecified type  -     CBC and differential; Future  -     Comprehensive metabolic panel; Future    HIV screening declined    Immunization counseling  Comments:  Recommend COVID vaccine    Other orders  -     Calcium Carb-Cholecalciferol (Calcium 1000 + D) 1000-800 MG-UNIT TABS; Take by mouth  -     folic acid (FOLVITE) 1 mg tablet; Take by mouth daily        Patient Instructions   To follow up with test results      Orders Placed This Encounter   Procedures    US pelvis complete non OB     Standing Status:   Future     Standing Expiration Date:   3/10/2025     Scheduling Instructions:      13 years and Up - 1)  Full bladder required  2)  Please drink 24-32 oz of water 1 hour prior to appointment time  3)  No voiding 1 hour prior to appointment time  "Patient must drink 24 oz of water 60 minutes beforeyour scheduled appointment time  This test requires you to have a FULL bladder  Please do not urinate 1 hour before your appointment time  Patient is not to urinate until their Ultrasound is completed  Bladder filling is akey part of this study and needs to be full for accurate imaging during this exam             Please bring your insurance cards, a form of photo ID and a list of your medications with you  Arrive 15 minutesprior to your appointment time in order to register "            To schedule this appointment, please contact Central Scheduling at [phone]   CBC and differential     This is a patient instruction: This test is non-fasting  Please drink two glasses of water morning of bloodwork  Standing Status:   Future     Standing Expiration Date:   3/10/2022    Comprehensive metabolic panel     This is a patient instruction: Patient fasting for 8 hours or longer recommended  Standing Status:   Future     Standing Expiration Date:   3/10/2022    TSH, 3rd generation with Free T4 reflex     Standing Status:   Future     Standing Expiration Date:   3/10/2022    Lipid Panel with Direct LDL reflex     This is a patient instruction: This test requires patient fasting for 10-12 hours or longer  Drinking of black coffee or black tea is acceptable  Standing Status:   Future     Standing Expiration Date:   3/10/2022    DHEA     Standing Status:   Future     Standing Expiration Date:   3/10/2022    Testosterone, free, total     This is a patient instruction: Fasting preferred  Collections for men not undergoing treatment must be completed between 7am-9am ONLY  Collection time restrictions are not applicable to women or men already undergoing treatment         Standing Status:   Future     Standing Expiration Date:   3/10/2022    Ambulatory referral to Dermatology     Standing Status:   Future Standing Expiration Date:   3/10/2022     Referral Priority:   Routine     Referral Type:   Consult - AMB     Referral Reason:   Specialty Services Required     Requested Specialty:   Dermatology     Number of Visits Requested:   1     Expiration Date:   3/10/2022         Subjective:     Patient ID: Dionna Hayes is a 22 y o  female      HPI  Dysmenorrhea  Patient stated in the beginning when she start having her menstrual period ,used to get pelvic pain before her menstrual   But when she got  3 years ago her symptoms was much less   in the last 1 year start experiencing the same significant pain she use to have before  Her menstrual period  Is regular and last 5-6 days with has been stable, no significant vaginal bleeding   Patient did have her routine gyn with Dr Kamilah Collins  Everything was normal but she forgot to tell her about her concerns  History of iron deficiency anemia  She is not taking any iron supplement   Denied fatigue or dizziness  Obesity  Patient she is a student and she really does not exercise and she spend long time sitting on the chair studying  Admit to regular diet  Psoriasis  Patient stated her diarrhea is acting up  She does Temovate at home to use  She has a patch on her lower skull and between buttocks area    No test done since last office visit    Review of Systems   Constitutional: Negative for chills, diaphoresis and fatigue  HENT: Negative for ear pain, sore throat, trouble swallowing and voice change  Eyes: Negative for visual disturbance  Respiratory: Negative for cough, chest tightness and shortness of breath  Cardiovascular: Negative for chest pain, palpitations and leg swelling  Gastrointestinal: Negative for abdominal pain, blood in stool, constipation, diarrhea and nausea  Endocrine: Negative for polydipsia and polyuria     Genitourinary: Negative for dysuria, flank pain, frequency, hematuria, pelvic pain, urgency, vaginal bleeding and vaginal discharge  Musculoskeletal: Negative for arthralgias, back pain, gait problem, myalgias and neck pain  Skin: Negative for rash  Allergic/Immunologic: Negative for food allergies  Neurological: Negative for dizziness, tremors, seizures, weakness, light-headedness, numbness and headaches  Hematological: Negative for adenopathy  Does not bruise/bleed easily  Psychiatric/Behavioral: Negative for confusion and dysphoric mood  The patient is not nervous/anxious  Objective:     Physical Exam  Constitutional:       General: She is not in acute distress  Appearance: Normal appearance  She is well-developed  She is not ill-appearing or diaphoretic  HENT:      Head: Normocephalic and atraumatic  Eyes:      General: No scleral icterus  Left eye: No discharge  Conjunctiva/sclera: Conjunctivae normal       Pupils: Pupils are equal, round, and reactive to light  Neck:      Thyroid: No thyromegaly  Vascular: No JVD  Cardiovascular:      Rate and Rhythm: Normal rate and regular rhythm  Heart sounds: Normal heart sounds  No murmur  Comments: Extremities  No edema  Pulmonary:      Effort: Pulmonary effort is normal       Breath sounds: Normal breath sounds  Abdominal:      Palpations: Abdomen is soft  There is no mass  Tenderness: There is no abdominal tenderness  There is no guarding or rebound  Hernia: No hernia is present  Lymphadenopathy:      Cervical: No cervical adenopathy  Skin:     Findings: No rash  Comments: Moderate size psoriatic  patch at the right lower scalp   Neurological:      General: No focal deficit present  Mental Status: She is alert and oriented to person, place, and time  Cranial Nerves: No cranial nerve deficit  Motor: No abnormal muscle tone  Coordination: Coordination normal    Psychiatric:         Mood and Affect: Mood normal          Behavior: Behavior normal          Thought Content:  Thought content normal      BMI Counseling: Body mass index is 34 12 kg/m²  The BMI is above normal  Nutrition recommendations include decreasing portion sizes

## 2021-03-10 NOTE — PROGRESS NOTES
Assessment/Plan:       No problem-specific Assessment & Plan notes found for this encounter  Diagnoses and all orders for this visit:    Well adult exam  Comments:  Advised to see Ophthalmology for routine eye care and the dentist for teeth care    Psoriasis  -     clobetasol (TEMOVATE) 0 05 % cream; Apply topically 2 (two) times a day  -     Ambulatory referral to Dermatology; Future    Dysmenorrhea  Comments:  GI side effect of Naprosyn discussed  Orders:  -     US pelvis complete non OB; Future  -     naproxen (NAPROSYN) 500 mg tablet; Take 1 tablet twice a day with food as needed p o  Weight gain  Comments:  Advised patient to start watching her diet and to exercise  Orders:  -     TSH, 3rd generation with Free T4 reflex; Future  -     Lipid Panel with Direct LDL reflex; Future    Smoking  Comments:  Consel patient about smoking advised to stop smoking  Patient stated she will try to stop smoking in 2 months after she finishes school    Obesity (BMI 30-39  9)  Comments:  Concel patient about weight loss  Orders:  -     Lipid Panel with Direct LDL reflex; Future    High serum testosterone  -     Testosterone, free, total; Future    Elevated DHEA (HCC)  -     DHEA; Future    Leukocytosis, unspecified type  -     CBC and differential; Future  -     Comprehensive metabolic panel; Future    HIV screening declined    Immunization counseling  Comments:  Recommend COVID vaccine    Other orders  -     Calcium Carb-Cholecalciferol (Calcium 1000 + D) 1000-800 MG-UNIT TABS; Take by mouth  -     folic acid (FOLVITE) 1 mg tablet; Take by mouth daily        Patient Instructions   To follow up with test results      Orders Placed This Encounter   Procedures    US pelvis complete non OB     Standing Status:   Future     Standing Expiration Date:   3/10/2025     Scheduling Instructions:      13 years and Up - 1)  Full bladder required  2)  Please drink 24-32 oz of water 1 hour prior to appointment time  3)  No voiding 1 hour prior to appointment time  "Patient must drink 24 oz of water 60 minutes beforeyour scheduled appointment time  This test requires you to have a FULL bladder  Please do not urinate 1 hour before your appointment time  Patient is not to urinate until their Ultrasound is completed  Bladder filling is akey part of this study and needs to be full for accurate imaging during this exam             Please bring your insurance cards, a form of photo ID and a list of your medications with you  Arrive 15 minutesprior to your appointment time in order to register "            To schedule this appointment, please contact Central Scheduling at [phone]   CBC and differential     This is a patient instruction: This test is non-fasting  Please drink two glasses of water morning of bloodwork  Standing Status:   Future     Standing Expiration Date:   3/10/2022    Comprehensive metabolic panel     This is a patient instruction: Patient fasting for 8 hours or longer recommended  Standing Status:   Future     Standing Expiration Date:   3/10/2022    TSH, 3rd generation with Free T4 reflex     Standing Status:   Future     Standing Expiration Date:   3/10/2022    Lipid Panel with Direct LDL reflex     This is a patient instruction: This test requires patient fasting for 10-12 hours or longer  Drinking of black coffee or black tea is acceptable  Standing Status:   Future     Standing Expiration Date:   3/10/2022    DHEA     Standing Status:   Future     Standing Expiration Date:   3/10/2022    Testosterone, free, total     This is a patient instruction: Fasting preferred  Collections for men not undergoing treatment must be completed between 7am-9am ONLY  Collection time restrictions are not applicable to women or men already undergoing treatment         Standing Status:   Future     Standing Expiration Date:   3/10/2022    Ambulatory referral to Dermatology     Standing Status:   Future Standing Expiration Date:   3/10/2022     Referral Priority:   Routine     Referral Type:   Consult - AMB     Referral Reason:   Specialty Services Required     Requested Specialty:   Dermatology     Number of Visits Requested:   1     Expiration Date:   3/10/2022         Subjective:     Patient ID: Mami Connors is a 22 y o  female      HPI   Patient overall she feels well  Denied anxiety or depression  Eye care  She is not following with Ophthalmology  Does not wear glasses  Dental care she has not been seen by a dentist for a long time   Denied dental problem  Gyn exam   Had gyn exam at the last summer  Breast exam also done in the same time  Immunization patient is up-to-date with her childhood immunization last time she took tetanus shot was 5 years ago and also she took  3 shots of HPV  Exercise  She student she said on the chair starting all day  Does not exercise   Diet  Regular diet  Smokes hookah  Drinks socially  Does not do drugs    Review of Systems   Constitutional: Negative for activity change, appetite change, chills, fatigue, fever and unexpected weight change  HENT: Negative for congestion, ear discharge, ear pain, hearing loss, mouth sores, nosebleeds, rhinorrhea, sinus pressure, sore throat, tinnitus, trouble swallowing and voice change  Eyes: Negative for photophobia, pain, discharge, itching and visual disturbance  Respiratory: Negative for cough, chest tightness, shortness of breath and wheezing  Cardiovascular: Negative for chest pain, palpitations and leg swelling  Gastrointestinal: Negative for abdominal pain, anal bleeding, blood in stool, constipation, diarrhea, nausea and vomiting  Endocrine: Negative for cold intolerance, heat intolerance, polydipsia and polyuria  Genitourinary: Negative for decreased urine volume, dyspareunia, dysuria, enuresis, flank pain, frequency, hematuria, urgency, vaginal bleeding and vaginal discharge     Musculoskeletal: Negative for arthralgias, back pain, gait problem, joint swelling, myalgias and neck pain  Skin: Negative for rash  Neurological: Negative for dizziness, tremors, seizures, syncope, facial asymmetry, speech difficulty, weakness, light-headedness, numbness and headaches  Hematological: Negative for adenopathy  Does not bruise/bleed easily  Psychiatric/Behavioral: Negative for agitation, behavioral problems, confusion, dysphoric mood, hallucinations and sleep disturbance  The patient is not nervous/anxious and is not hyperactive  Objective:     Physical Exam  Constitutional:       General: She is not in acute distress  Appearance: Normal appearance  She is well-developed  She is not ill-appearing, toxic-appearing or diaphoretic  HENT:      Head: Normocephalic and atraumatic  Comments: Whisper test normal bilaterally 6 ft away     Right Ear: Tympanic membrane, ear canal and external ear normal       Left Ear: Tympanic membrane, ear canal and external ear normal       Nose:      Comments: Nose and throat  Not examined, has a mask due to COVID  Eyes:      General: No scleral icterus  Right eye: No discharge  Left eye: No discharge  Extraocular Movements: Extraocular movements intact  Conjunctiva/sclera: Conjunctivae normal       Pupils: Pupils are equal, round, and reactive to light  Neck:      Musculoskeletal: Neck supple  No neck rigidity or muscular tenderness  Thyroid: No thyromegaly  Vascular: No carotid bruit or JVD  Cardiovascular:      Rate and Rhythm: Normal rate and regular rhythm  Pulses:           Carotid pulses are 3+ on the right side and 3+ on the left side  Dorsalis pedis pulses are 3+ on the right side and 3+ on the left side  Posterior tibial pulses are 3+ on the right side and 3+ on the left side  Heart sounds: Normal heart sounds  No murmur  Pulmonary:      Effort: Pulmonary effort is normal  No respiratory distress  Breath sounds: Normal breath sounds  No stridor  No wheezing or rales  Abdominal:      General: Bowel sounds are normal  There is no distension  Palpations: Abdomen is soft  There is no mass  Tenderness: There is no abdominal tenderness  There is no guarding or rebound  Hernia: No hernia is present  Musculoskeletal: Normal range of motion  General: No deformity  Right lower leg: No edema  Left lower leg: No edema  Lymphadenopathy:      Cervical: No cervical adenopathy  Skin:     Coloration: Skin is not jaundiced or pale  Findings: No bruising or erythema  Comments: Moderate size psoriatic  patch at the lower right  scalp    Neurological:      General: No focal deficit present  Mental Status: She is alert and oriented to person, place, and time  Mental status is at baseline  Cranial Nerves: No cranial nerve deficit  Sensory: No sensory deficit  Motor: No weakness or abnormal muscle tone  Coordination: Coordination normal       Gait: Gait normal       Deep Tendon Reflexes: Reflexes normal       Comments: Negative Babinski  Negative Romberg   Psychiatric:         Mood and Affect: Mood normal          Behavior: Behavior normal          Thought Content:  Thought content normal          Judgment: Judgment normal

## 2021-03-30 ENCOUNTER — VBI (OUTPATIENT)
Dept: ADMINISTRATIVE | Facility: OTHER | Age: 26
End: 2021-03-30

## 2021-05-18 ENCOUNTER — OFFICE VISIT (OUTPATIENT)
Dept: OBGYN CLINIC | Facility: CLINIC | Age: 26
End: 2021-05-18
Payer: COMMERCIAL

## 2021-05-18 VITALS
BODY MASS INDEX: 32.92 KG/M2 | HEIGHT: 66 IN | DIASTOLIC BLOOD PRESSURE: 66 MMHG | WEIGHT: 204.8 LBS | SYSTOLIC BLOOD PRESSURE: 114 MMHG

## 2021-05-18 DIAGNOSIS — N93.0 PCB (POST COITAL BLEEDING): Primary | ICD-10-CM

## 2021-05-18 PROCEDURE — 87624 HPV HI-RISK TYP POOLED RSLT: CPT | Performed by: OBSTETRICS & GYNECOLOGY

## 2021-05-18 PROCEDURE — 88175 CYTOPATH C/V AUTO FLUID REDO: CPT | Performed by: OBSTETRICS & GYNECOLOGY

## 2021-05-18 PROCEDURE — 99213 OFFICE O/P EST LOW 20 MIN: CPT | Performed by: OBSTETRICS & GYNECOLOGY

## 2021-05-18 PROCEDURE — 87591 N.GONORRHOEAE DNA AMP PROB: CPT | Performed by: OBSTETRICS & GYNECOLOGY

## 2021-05-18 PROCEDURE — 87491 CHLMYD TRACH DNA AMP PROBE: CPT | Performed by: OBSTETRICS & GYNECOLOGY

## 2021-05-18 NOTE — PROGRESS NOTES
Patient is a 22 y o  Greg Camara with Patient's last menstrual period was 2021  who presents requesting evaluation of abnormal bleeding  Pt reports that she is actively attempting pregnancy and when she had intercourse on 2021 she began bleeding immediately  She reports it was bright red and then it turned darker until it became spotting and then stopped on 2021  She reports this has never happened before  She did not have any pain associated with it  She reports otherwise that her menses are monthly and they last 5-6 days  She reports they are heavier the first 3 days and then taper off  She does report nausea after intercourse for the last 2 months  She reports that prior to attempting pregnancy she only had intercourse once weekly but now it is 4-6x/week  Reviewed with patient she may have nausea from movement of her uterus as she typically does not have intercourse frequently  Did review that for active attempts at pregnancy, recommend every other day intercourse from day 10-20 of her cycle  Pt reports she is ovulating  Pt has h o a tubal cyst, 11 cm in the past  Advised pelvic u/s--PCP has already ordered  Advised pt to have done to r/o pathology  Additionally, recommend diagnostic pap (last pap 3 years ago) and sugar/ch screening for eval of post coital bleeding  Pt agreeable       Past Medical History:   Diagnosis Date    Anal itching     since childhood    Chicken pox     Cyst of fallopian tube 2017    Right side, 11 cm, benign    Generalized headaches     Need for HPV vaccination     completed series 2018    Psoriasis        Past Surgical History:   Procedure Laterality Date    NOSE SURGERY      Nasal Septum deviation repair, lazer    NV REMOVAL OF OVARIAN CYST(S) Right 3/1/2018    Procedure: LAPAROSCOPIC RIGHT SALPINGOCYSTECTOMY;  Surgeon: Mari Munoz MD;  Location: [facility];  Service: Gynecology       OB History    Para Term  AB Living   0 0 0 0 0 0   SAB TAB Ectopic Multiple Live Births   0 0 0 0 0   Obstetric Comments   Menarche: 13      28/5-6/super tampon every 2-4 hours            Current Outpatient Medications:     Calcium Carb-Cholecalciferol (Calcium 1000 + D) 1000-800 MG-UNIT TABS, Take by mouth, Disp: , Rfl:     clobetasol (TEMOVATE) 0 05 % cream, Apply topically 2 (two) times a day, Disp: 30 g, Rfl: 0    ferrous sulfate 325 (65 Fe) mg tablet, Take 325 mg by mouth daily with breakfast During menses, Disp: , Rfl:     folic acid (FOLVITE) 1 mg tablet, Take by mouth daily, Disp: , Rfl:     naproxen (NAPROSYN) 500 mg tablet, Take 1 tablet twice a day with food as needed p o , Disp: 20 tablet, Rfl: 0    Allergies   Allergen Reactions    No Known Allergies        Social History     Socioeconomic History    Marital status: /Civil Union     Spouse name: None    Number of children: 0    Years of education: college student    Highest education level: None   Occupational History    Occupation: student   Social Needs    Financial resource strain: None    Food insecurity     Worry: None     Inability: None    Transportation needs     Medical: None     Non-medical: None   Tobacco Use    Smoking status: Never Smoker    Smokeless tobacco: Current User   Substance and Sexual Activity    Alcohol use: Yes     Frequency: Monthly or less     Drinks per session: 1 or 2     Binge frequency: Never     Comment: occasional    Drug use: No    Sexual activity: Yes     Partners: Male     Birth control/protection: None     Comment: lifetime partners: 1   Lifestyle    Physical activity     Days per week: None     Minutes per session: None    Stress: None   Relationships    Social connections     Talks on phone: None     Gets together: None     Attends Taoism service: None     Active member of club or organization: None     Attends meetings of clubs or organizations: None     Relationship status: None    Intimate partner violence     Fear of current or ex partner: None     Emotionally abused: None     Physically abused: None     Forced sexual activity: None   Other Topics Concern    None   Social History Narrative    College student - Electrical engineering    Eastern Sabianist [place]    Hookah pipe smoker    Sedetary lifestyle    Accepts blood products        Exercise: none    Calcium: 1 serving cheese 3-4x  week, yogurt once/week       Family History   Problem Relation Age of Onset    Hyperlipidemia Mother     Heart disease Mother     Heart attack Father 36    Heart disease Father     Leicester's disease Maternal Grandmother     Breast cancer Neg Hx     Ovarian cancer Neg Hx     Colon cancer Neg Hx        Review of Systems   Constitutional: Negative for chills, fatigue, fever and unexpected weight change  HENT: Negative for congestion, mouth sores and sore throat  Respiratory: Negative for cough, chest tightness, shortness of breath and wheezing  Cardiovascular: Negative for chest pain and palpitations  Gastrointestinal: Negative for abdominal distention, abdominal pain, constipation, diarrhea, nausea (after intercourse) and vomiting  Endocrine: Negative for cold intolerance and heat intolerance  Genitourinary: Positive for vaginal bleeding (post coital bleeding)  Negative for dyspareunia, dysuria, genital sores, menstrual problem, pelvic pain, vaginal discharge and vaginal pain  Musculoskeletal: Negative for arthralgias  Skin: Negative for color change and rash  Neurological: Negative for dizziness, light-headedness and headaches  Hematological: Negative for adenopathy  Blood pressure 114/66, height 5' 6" (1 676 m), weight 92 9 kg (204 lb 12 8 oz), last menstrual period 05/06/2021, not currently breastfeeding  and Body mass index is 33 06 kg/m²  Physical Exam  Constitutional:       General: She is not in acute distress  Appearance: Normal appearance  She is well-developed  She is obese  She is not ill-appearing     HENT: Head: Normocephalic and atraumatic  Eyes:      Extraocular Movements: Extraocular movements intact  Conjunctiva/sclera: Conjunctivae normal    Neck:      Musculoskeletal: Normal range of motion and neck supple  Thyroid: No thyromegaly  Trachea: No tracheal deviation  Cardiovascular:      Rate and Rhythm: Normal rate and regular rhythm  Heart sounds: Normal heart sounds  Pulmonary:      Effort: Pulmonary effort is normal  No respiratory distress  Breath sounds: Normal breath sounds  No stridor  No wheezing or rales  Abdominal:      General: Bowel sounds are normal  There is no distension  Palpations: Abdomen is soft  There is no mass  Tenderness: There is no abdominal tenderness  There is no guarding or rebound  Hernia: No hernia is present  Musculoskeletal: Normal range of motion  General: No tenderness  Lymphadenopathy:      Cervical: No cervical adenopathy  Skin:     General: Skin is warm  Findings: No erythema or rash  Neurological:      Mental Status: She is alert and oriented to person, place, and time  Psychiatric:         Mood and Affect: Mood normal          Behavior: Behavior normal          Thought Content: Thought content normal          Judgment: Judgment normal           vulva: normal external genitalia for age and no lesions, masses, epithelial changes, or exudate  vagina: color pink, rugae  well formed rugae and discharge  clear   cervix: nullip and no lesions   uterus: NSSC, AF, NT, mobile  adnexa: no masses or tenderness      A/P:  Pt is a 22 y o  Rennis Chiles with      Bunny Primes was seen today for menstrual problem      Diagnoses and all orders for this visit:    PCB (post coital bleeding)  -     Chlamydia/GC amplified DNA by PCR  -     Liquid-based pap, diagnostic    f u on pelvic u/s as ordered by PCP

## 2021-05-21 ENCOUNTER — APPOINTMENT (OUTPATIENT)
Dept: LAB | Age: 26
End: 2021-05-21
Payer: COMMERCIAL

## 2021-05-21 DIAGNOSIS — D72.829 LEUKOCYTOSIS, UNSPECIFIED TYPE: ICD-10-CM

## 2021-05-21 DIAGNOSIS — R79.89 HIGH SERUM TESTOSTERONE: ICD-10-CM

## 2021-05-21 DIAGNOSIS — R79.89 ELEVATED DHEA: ICD-10-CM

## 2021-05-21 DIAGNOSIS — R63.5 WEIGHT GAIN: ICD-10-CM

## 2021-05-21 DIAGNOSIS — E66.9 OBESITY (BMI 30-39.9): ICD-10-CM

## 2021-05-21 LAB
ALBUMIN SERPL BCP-MCNC: 3.7 G/DL (ref 3.5–5)
ALP SERPL-CCNC: 45 U/L (ref 46–116)
ALT SERPL W P-5'-P-CCNC: 31 U/L (ref 12–78)
ANION GAP SERPL CALCULATED.3IONS-SCNC: 5 MMOL/L (ref 4–13)
AST SERPL W P-5'-P-CCNC: 12 U/L (ref 5–45)
BASOPHILS # BLD AUTO: 0.04 THOUSANDS/ΜL (ref 0–0.1)
BASOPHILS NFR BLD AUTO: 0 % (ref 0–1)
BILIRUB SERPL-MCNC: 0.21 MG/DL (ref 0.2–1)
BUN SERPL-MCNC: 15 MG/DL (ref 5–25)
C TRACH DNA SPEC QL NAA+PROBE: NEGATIVE
CALCIUM SERPL-MCNC: 9 MG/DL (ref 8.3–10.1)
CHLORIDE SERPL-SCNC: 109 MMOL/L (ref 100–108)
CHOLEST SERPL-MCNC: 172 MG/DL (ref 50–200)
CO2 SERPL-SCNC: 25 MMOL/L (ref 21–32)
CREAT SERPL-MCNC: 0.65 MG/DL (ref 0.6–1.3)
EOSINOPHIL # BLD AUTO: 0.12 THOUSAND/ΜL (ref 0–0.61)
EOSINOPHIL NFR BLD AUTO: 1 % (ref 0–6)
ERYTHROCYTE [DISTWIDTH] IN BLOOD BY AUTOMATED COUNT: 13.2 % (ref 11.6–15.1)
GFR SERPL CREATININE-BSD FRML MDRD: 124 ML/MIN/1.73SQ M
GLUCOSE P FAST SERPL-MCNC: 105 MG/DL (ref 65–99)
HCT VFR BLD AUTO: 41 % (ref 34.8–46.1)
HDLC SERPL-MCNC: 53 MG/DL
HGB BLD-MCNC: 13.1 G/DL (ref 11.5–15.4)
IMM GRANULOCYTES # BLD AUTO: 0.03 THOUSAND/UL (ref 0–0.2)
IMM GRANULOCYTES NFR BLD AUTO: 0 % (ref 0–2)
LDLC SERPL CALC-MCNC: 99 MG/DL (ref 0–100)
LYMPHOCYTES # BLD AUTO: 2.38 THOUSANDS/ΜL (ref 0.6–4.47)
LYMPHOCYTES NFR BLD AUTO: 26 % (ref 14–44)
MCH RBC QN AUTO: 26 PG (ref 26.8–34.3)
MCHC RBC AUTO-ENTMCNC: 32 G/DL (ref 31.4–37.4)
MCV RBC AUTO: 81 FL (ref 82–98)
MONOCYTES # BLD AUTO: 0.6 THOUSAND/ΜL (ref 0.17–1.22)
MONOCYTES NFR BLD AUTO: 7 % (ref 4–12)
N GONORRHOEA DNA SPEC QL NAA+PROBE: NEGATIVE
NEUTROPHILS # BLD AUTO: 6.04 THOUSANDS/ΜL (ref 1.85–7.62)
NEUTS SEG NFR BLD AUTO: 66 % (ref 43–75)
NRBC BLD AUTO-RTO: 0 /100 WBCS
PLATELET # BLD AUTO: 284 THOUSANDS/UL (ref 149–390)
PMV BLD AUTO: 10.5 FL (ref 8.9–12.7)
POTASSIUM SERPL-SCNC: 4.3 MMOL/L (ref 3.5–5.3)
PROT SERPL-MCNC: 7.3 G/DL (ref 6.4–8.2)
RBC # BLD AUTO: 5.04 MILLION/UL (ref 3.81–5.12)
SODIUM SERPL-SCNC: 139 MMOL/L (ref 136–145)
TRIGL SERPL-MCNC: 100 MG/DL
TSH SERPL DL<=0.05 MIU/L-ACNC: 2.58 UIU/ML (ref 0.36–3.74)
WBC # BLD AUTO: 9.21 THOUSAND/UL (ref 4.31–10.16)

## 2021-05-21 PROCEDURE — 84402 ASSAY OF FREE TESTOSTERONE: CPT

## 2021-05-21 PROCEDURE — 80061 LIPID PANEL: CPT

## 2021-05-21 PROCEDURE — 82626 DEHYDROEPIANDROSTERONE: CPT

## 2021-05-21 PROCEDURE — 80053 COMPREHEN METABOLIC PANEL: CPT

## 2021-05-21 PROCEDURE — 84403 ASSAY OF TOTAL TESTOSTERONE: CPT

## 2021-05-21 PROCEDURE — 85025 COMPLETE CBC W/AUTO DIFF WBC: CPT

## 2021-05-21 PROCEDURE — 84443 ASSAY THYROID STIM HORMONE: CPT

## 2021-05-21 PROCEDURE — 36415 COLL VENOUS BLD VENIPUNCTURE: CPT

## 2021-05-22 LAB
HPV HR 12 DNA CVX QL NAA+PROBE: NEGATIVE
HPV16 DNA CVX QL NAA+PROBE: NEGATIVE
HPV18 DNA CVX QL NAA+PROBE: NEGATIVE
TESTOST FREE SERPL-MCNC: 3.5 PG/ML (ref 0–4.2)
TESTOST SERPL-MCNC: 56 NG/DL (ref 13–71)

## 2021-05-24 LAB
DHEA SERPL-MCNC: 527 NG/DL (ref 31–701)
LAB AP GYN PRIMARY INTERPRETATION: NORMAL
Lab: NORMAL
PATH INTERP SPEC-IMP: NORMAL

## 2021-05-25 ENCOUNTER — HOSPITAL ENCOUNTER (OUTPATIENT)
Dept: RADIOLOGY | Age: 26
Discharge: HOME | End: 2021-05-25
Payer: COMMERCIAL

## 2021-05-25 DIAGNOSIS — N94.6 DYSMENORRHEA: ICD-10-CM

## 2021-05-25 PROCEDURE — 76856 US EXAM PELVIC COMPLETE: CPT

## 2021-05-25 PROCEDURE — 76830 TRANSVAGINAL US NON-OB: CPT

## 2021-06-03 ENCOUNTER — OFFICE VISIT (OUTPATIENT)
Dept: FAMILY MEDICINE CLINIC | Facility: CLINIC | Age: 26
End: 2021-06-03
Payer: COMMERCIAL

## 2021-06-03 VITALS
BODY MASS INDEX: 32.62 KG/M2 | OXYGEN SATURATION: 98 % | HEART RATE: 80 BPM | DIASTOLIC BLOOD PRESSURE: 80 MMHG | HEIGHT: 66 IN | SYSTOLIC BLOOD PRESSURE: 110 MMHG | TEMPERATURE: 97.3 F | WEIGHT: 203 LBS | RESPIRATION RATE: 16 BRPM

## 2021-06-03 DIAGNOSIS — N92.6 ABNORMAL MENSTRUAL PERIODS: Primary | ICD-10-CM

## 2021-06-03 DIAGNOSIS — R79.89 ELEVATED DHEA: ICD-10-CM

## 2021-06-03 DIAGNOSIS — L91.8 SKIN TAG: ICD-10-CM

## 2021-06-03 DIAGNOSIS — E66.9 OBESITY (BMI 30-39.9): ICD-10-CM

## 2021-06-03 DIAGNOSIS — L65.9 HAIR LOSS: ICD-10-CM

## 2021-06-03 DIAGNOSIS — Z71.85 IMMUNIZATION COUNSELING: ICD-10-CM

## 2021-06-03 DIAGNOSIS — R93.5 ABNORMAL ULTRASOUND OF UTERUS: ICD-10-CM

## 2021-06-03 DIAGNOSIS — R73.9 ELEVATED BLOOD SUGAR: ICD-10-CM

## 2021-06-03 DIAGNOSIS — F17.200 SMOKING: ICD-10-CM

## 2021-06-03 DIAGNOSIS — R79.89 HIGH SERUM TESTOSTERONE: ICD-10-CM

## 2021-06-03 LAB — SL AMB POCT URINE HCG: NEGATIVE

## 2021-06-03 PROCEDURE — 81025 URINE PREGNANCY TEST: CPT | Performed by: FAMILY MEDICINE

## 2021-06-03 PROCEDURE — 99214 OFFICE O/P EST MOD 30 MIN: CPT | Performed by: FAMILY MEDICINE

## 2021-06-03 NOTE — PROGRESS NOTES
Assessment/Plan:       No problem-specific Assessment & Plan notes found for this encounter  Diagnoses and all orders for this visit:    Abnormal menstrual periods  Comments: To follow with her gyn  Orders:  -     POCT urine HCG  -     Pregnancy Test (HCG Qualitative); Future    Hair loss  Comments:  Improved    Elevated blood sugar  Comments: To follow with low sweet and carbohydrate diet  Advised to lose weight    Abnormal ultrasound of uterus  Comments: To follow with her gyn    Smoking  Comments:  Advised to stop smoking hookah    High serum testosterone  Comments:  Resolved    Elevated DHEA (HCC)  Comments:  Elevated DHEA sulfate  But normal DHEA  Orders:  -     DHEA-sulfate; Future    Skin tag  Comments:  Discussed excision  Discussed referral to plastic surgeon  Patient would like to hold on that    Obesity (BMI 30-39  9)  Comments:  Advised to lose weight  Discussed referral to weight management  Patient stated she knows what to do but she is not doing what she has to do    Immunization counseling  Comments:  Recommend COVID vaccine        Patient Instructions   To follow up with test results      Orders Placed This Encounter   Procedures    Pregnancy Test (HCG Qualitative)     Standing Status:   Future     Standing Expiration Date:   6/3/2022    DHEA-sulfate     Standing Status:   Future     Standing Expiration Date:   6/3/2022    POCT urine HCG         Subjective:     Patient ID: Eryn Rea is a 22 y o  female      HPI  Hair loss,no futher hair loss  Had vaginal bleeding, she had her menstural period on 5-6 x 5 day  Three days later she after intercourse she has heavy bleeding  She did see her gyn  Had gyn exam and Pap  Patient stated last menstrual period 3 days ago was different  She had only dark blood only spots  She check pregnancy test his he thought it was positive  Seb[facility] digital pregnancy test was negative      Skin lesion patient stated she has a skin lesion for 2 years located left side of her waist   Lesion is floppy  And the same color as the skin  Swink [employer] Her cloth starting irritating the lesion  Lesion is stable    Test results  Pelvic ultrasound  Lab done on May 21, 2021   discussed result with patient    Review of Systems    Objective:     Physical Exam  Constitutional:       General: She is not in acute distress  Appearance: She is well-developed  HENT:      Head: Normocephalic and atraumatic  Comments: Hair  Normal  Eyes:      General: No scleral icterus  Conjunctiva/sclera: Conjunctivae normal       Pupils: Pupils are equal, round, and reactive to light  Neck:      Musculoskeletal: Neck supple  Thyroid: No thyromegaly  Vascular: No JVD  Cardiovascular:      Rate and Rhythm: Normal rate and regular rhythm  Pulses:           Carotid pulses are 3+ on the right side and 3+ on the left side  Heart sounds: Normal heart sounds  No murmur  Comments: Extremities  No edema  Pulmonary:      Effort: Pulmonary effort is normal       Breath sounds: Normal breath sounds  Abdominal:      General: Bowel sounds are normal  There is no distension  Palpations: Abdomen is soft  There is no mass  Tenderness: There is no abdominal tenderness  There is no guarding or rebound  Hernia: No hernia is present  Lymphadenopathy:      Cervical: No cervical adenopathy  Skin:     Findings: No rash  Comments: There is 10x6 mm skin color floppy lesion located at the left side of her Waist   Neurological:      General: No focal deficit present  Mental Status: She is alert and oriented to person, place, and time  Cranial Nerves: No cranial nerve deficit  Motor: No weakness or abnormal muscle tone  Coordination: Coordination normal       Gait: Gait normal    Psychiatric:         Mood and Affect: Mood normal          Behavior: Behavior normal          Thought Content:  Thought content normal          Judgment: Judgment normal

## 2021-06-04 PROBLEM — E66.9 OBESITY (BMI 30-39.9): Status: ACTIVE | Noted: 2021-06-04

## 2021-06-04 PROBLEM — R73.9 ELEVATED BLOOD SUGAR: Status: ACTIVE | Noted: 2021-06-04

## 2021-06-04 PROBLEM — R93.5 ABNORMAL ULTRASOUND OF UTERUS: Status: ACTIVE | Noted: 2021-06-04

## 2021-06-04 PROBLEM — N92.6 ABNORMAL MENSTRUAL PERIODS: Status: ACTIVE | Noted: 2021-06-04

## 2021-06-04 PROBLEM — R42 VERTIGO: Status: RESOLVED | Noted: 2019-11-25 | Resolved: 2021-06-04

## 2021-06-04 PROBLEM — R79.89 HIGH SERUM TESTOSTERONE: Status: RESOLVED | Noted: 2019-11-25 | Resolved: 2021-06-04

## 2021-06-04 PROBLEM — Z71.85 IMMUNIZATION COUNSELING: Status: ACTIVE | Noted: 2021-06-04

## 2021-06-04 PROBLEM — L65.9 HAIR LOSS: Status: RESOLVED | Noted: 2019-11-25 | Resolved: 2021-06-04

## 2021-06-04 PROBLEM — D72.829 LEUKOCYTOSIS: Status: RESOLVED | Noted: 2019-11-25 | Resolved: 2021-06-04

## 2021-06-04 PROBLEM — L91.8 SKIN TAG: Status: ACTIVE | Noted: 2021-06-04

## 2021-06-05 ENCOUNTER — APPOINTMENT (OUTPATIENT)
Dept: LAB | Age: 26
End: 2021-06-05
Payer: COMMERCIAL

## 2021-06-05 DIAGNOSIS — R79.89 ELEVATED DHEA: ICD-10-CM

## 2021-06-05 DIAGNOSIS — N92.6 ABNORMAL MENSTRUAL PERIODS: ICD-10-CM

## 2021-06-05 LAB — HCG SERPL QL: NEGATIVE

## 2021-06-05 PROCEDURE — 82627 DEHYDROEPIANDROSTERONE: CPT

## 2021-06-05 PROCEDURE — 36415 COLL VENOUS BLD VENIPUNCTURE: CPT

## 2021-06-05 PROCEDURE — 84703 CHORIONIC GONADOTROPIN ASSAY: CPT

## 2021-06-06 LAB — DHEA-S SERPL-MCNC: 440 UG/DL (ref 84.8–378)

## 2021-06-07 ENCOUNTER — IMMUNIZATIONS (OUTPATIENT)
Dept: FAMILY MEDICINE CLINIC | Facility: HOSPITAL | Age: 26
End: 2021-06-07

## 2021-06-07 DIAGNOSIS — Z23 ENCOUNTER FOR IMMUNIZATION: Primary | ICD-10-CM

## 2021-06-07 PROCEDURE — 0001A SARS-COV-2 / COVID-19 MRNA VACCINE (PFIZER-BIONTECH) 30 MCG: CPT

## 2021-06-07 PROCEDURE — 91300 SARS-COV-2 / COVID-19 MRNA VACCINE (PFIZER-BIONTECH) 30 MCG: CPT

## 2021-06-28 ENCOUNTER — IMMUNIZATIONS (OUTPATIENT)
Dept: FAMILY MEDICINE CLINIC | Facility: HOSPITAL | Age: 26
End: 2021-06-28

## 2021-06-28 DIAGNOSIS — Z23 ENCOUNTER FOR IMMUNIZATION: Primary | ICD-10-CM

## 2021-06-28 PROCEDURE — 0002A SARS-COV-2 / COVID-19 MRNA VACCINE (PFIZER-BIONTECH) 30 MCG: CPT

## 2021-06-28 PROCEDURE — 91300 SARS-COV-2 / COVID-19 MRNA VACCINE (PFIZER-BIONTECH) 30 MCG: CPT

## 2021-07-16 ENCOUNTER — TELEPHONE (OUTPATIENT)
Dept: OBGYN CLINIC | Facility: CLINIC | Age: 26
End: 2021-07-16

## 2021-07-16 NOTE — TELEPHONE ENCOUNTER
I did confirm with the pt it is the results from May and informed her you will call her later after patients

## 2021-07-16 NOTE — TELEPHONE ENCOUNTER
Pt called stating she left a message for Dr Jani Freeman over a week ago asking her to review her blood work and [address] that she had done with her family doctor  Pt stated that at her last visit with Dr Jani Freeman she was told to call office to let her know when she has these test done so she can review them as well  Pt would like Dr Jani Freeman to call her when she has a chance to discuss them with her

## 2021-07-16 NOTE — TELEPHONE ENCOUNTER
I never received a message and I did not receive a copy of her labs because they were probably sent to her PCP if she ordered them  I will look at them today and call her this afternoon  To clarify she is talking about the labs and u/s ordered in May?     Please notify her of the same

## 2021-07-19 ENCOUNTER — OFFICE VISIT (OUTPATIENT)
Dept: OBGYN CLINIC | Facility: CLINIC | Age: 26
End: 2021-07-19
Payer: COMMERCIAL

## 2021-07-19 VITALS
OXYGEN SATURATION: 98 % | SYSTOLIC BLOOD PRESSURE: 104 MMHG | HEIGHT: 66 IN | BODY MASS INDEX: 32.88 KG/M2 | WEIGHT: 204.6 LBS | DIASTOLIC BLOOD PRESSURE: 68 MMHG | HEART RATE: 93 BPM

## 2021-07-19 DIAGNOSIS — N91.2 ABSENT MENSES: ICD-10-CM

## 2021-07-19 DIAGNOSIS — Z34.90 PREGNANCY AT EARLY STAGE: Primary | ICD-10-CM

## 2021-07-19 DIAGNOSIS — Z11.3 SCREENING EXAMINATION FOR STD (SEXUALLY TRANSMITTED DISEASE): ICD-10-CM

## 2021-07-19 DIAGNOSIS — N76.3 CHRONIC VULVITIS: ICD-10-CM

## 2021-07-19 LAB
EXTERNAL CHLAMYDIA SCREEN: NEGATIVE
SL AMB POCT URINE HCG: POSITIVE

## 2021-07-19 PROCEDURE — 99213 OFFICE O/P EST LOW 20 MIN: CPT | Performed by: NURSE PRACTITIONER

## 2021-07-19 PROCEDURE — 87491 CHLMYD TRACH DNA AMP PROBE: CPT | Performed by: NURSE PRACTITIONER

## 2021-07-19 PROCEDURE — 87591 N.GONORRHOEAE DNA AMP PROB: CPT | Performed by: NURSE PRACTITIONER

## 2021-07-19 NOTE — PROGRESS NOTES
Assessment/Plan:    1  Pregnancy at early stage  6w 2d by LMP  RV for dating US and OB intake  Reviewed measures for managing nausea  G/C obtained  - POCT urine HCG: positive    3  Chronic vulvitis  Managed with clobetasol per her PCP  Advised her to cease use since very strong steroid  States that she needs something for the itching-- rx sent for triamcinolone-- uses approx once a week  - triamcinolone (KENALOG) 0 1 % ointment; Apply topically 2 (two) times a day  Dispense: 30 g; Refill: 0    Dating US 1-2w  Ob intake    Subjective:      Patient ID: Jesica Cheema is a 22 y o  female  HPI  PROBLEM VISIT  CC: confirm pregnancy    21 yo G0 presents to confirm her pregnancy  Her LMP was 6/5/21 and her periods are regular  Reports some nausea  Has chronic itching in genital/ perianal area for which her PCP ordered clobetasol  5/2021 pap, g/c negative  BMI 33      The following portions of the patient's history were reviewed and updated as appropriate: allergies, current medications, past family history, past medical history, past social history, past surgical history and problem list     Review of Systems   Constitutional: Negative for chills and fever  Respiratory: Negative for cough and shortness of breath  Gastrointestinal: Positive for nausea  Genitourinary: Negative for difficulty urinating, dysuria, frequency, pelvic pain (minor menstrual like cramping), urgency, vaginal bleeding and vaginal discharge  Genital/perianal itching   Musculoskeletal: Negative for arthralgias and myalgias  Objective:    /68 (BP Location: Left arm, Patient Position: Sitting, Cuff Size: Standard)   Pulse 93   Ht 5' 6" (1 676 m)   Wt 92 8 kg (204 lb 9 6 oz)   LMP 06/05/2021 (Exact Date)   SpO2 98%   Breastfeeding No   BMI 33 02 kg/m²      Physical Exam  Constitutional:       General: She is not in acute distress  Appearance: Normal appearance  She is well-developed  She is obese   She is not ill-appearing or diaphoretic  HENT:      Head: Normocephalic and atraumatic  Eyes:      Pupils: Pupils are equal, round, and reactive to light  Pulmonary:      Effort: Pulmonary effort is normal    Genitourinary:     Exam position: Lithotomy position  Labia:         Right: No rash, tenderness, lesion or injury  Left: No rash, tenderness, lesion or injury  Vagina: No signs of injury and foreign body  No vaginal discharge, erythema, tenderness or bleeding  Cervix: No cervical motion tenderness, discharge or friability  Uterus: Not enlarged and not tender  Adnexa:         Right: No mass or tenderness  Left: No mass or tenderness  Comments: Cervix long and closed  Skin:     General: Skin is warm and dry  Neurological:      General: No focal deficit present  Mental Status: She is alert and oriented to person, place, and time  Psychiatric:         Mood and Affect: Mood normal          Behavior: Behavior normal          Thought Content:  Thought content normal          Judgment: Judgment normal

## 2021-07-19 NOTE — PATIENT INSTRUCTIONS
May take vitamin B6 50 mg with 1/2 tablet of Unisom three times a day  Eat every two hours and drink cold beverages in between

## 2021-07-21 LAB
C TRACH DNA SPEC QL NAA+PROBE: NEGATIVE
N GONORRHOEA DNA SPEC QL NAA+PROBE: NEGATIVE

## 2021-07-21 NOTE — TELEPHONE ENCOUNTER
I called the patient back  She reports she was concerned about the small cyst seen in her endometrium that her PCP told her about  Pt is now currently pregnant  Advised pt she will be coming in for a dating u/s  And we will look to see if it is still present  Pt agreeable

## 2021-07-27 ENCOUNTER — ULTRASOUND (OUTPATIENT)
Dept: OBGYN CLINIC | Facility: CLINIC | Age: 26
End: 2021-07-27
Payer: COMMERCIAL

## 2021-07-27 DIAGNOSIS — O36.80X0 ENCOUNTER TO DETERMINE FETAL VIABILITY OF PREGNANCY, SINGLE OR UNSPECIFIED FETUS: Primary | ICD-10-CM

## 2021-07-27 PROCEDURE — 76801 OB US < 14 WKS SINGLE FETUS: CPT | Performed by: OBSTETRICS & GYNECOLOGY

## 2021-07-27 NOTE — PROGRESS NOTES
SV IUP=160 BPM @ 7w3d    CRL=12 2mm=7w3d    EDC=3/12/2022    UT=64 x 57 x 72 mm  RT OV=28 x 26 x 22 mm  LT OV+28 x 25 x 26 mm

## 2021-08-11 ENCOUNTER — INITIAL PRENATAL (OUTPATIENT)
Dept: OBGYN CLINIC | Facility: CLINIC | Age: 26
End: 2021-08-11

## 2021-08-11 DIAGNOSIS — Z34.01 ENCOUNTER FOR SUPERVISION OF NORMAL FIRST PREGNANCY IN FIRST TRIMESTER: Primary | ICD-10-CM

## 2021-08-11 DIAGNOSIS — O99.211 OBESITY AFFECTING PREGNANCY IN FIRST TRIMESTER: ICD-10-CM

## 2021-08-11 PROCEDURE — OBC: Performed by: NURSE PRACTITIONER

## 2021-08-11 NOTE — PROGRESS NOTES
Hannah Tinsley is a 32year old  1 para 0000  pregnant female who presents for her obstetrical intake  She is 9w4d gestation based upon LMP and ultrasound dating  She is accompanied by her / FOB "Franky"  21 ob US: 7w3d  2021 pap negative  2021 G/C negative    She is feeling well, no complaints  Obstetrical history:  None    Personal history:  Past Medical History:   Diagnosis Date    Anal itching     since childhood    Chicken pox     Cyst of fallopian tube 2017    Right side, 11 cm, benign    Generalized headaches     Need for HPV vaccination     completed series     Psoriasis        Surgical history:  Past Surgical History:   Procedure Laterality Date    NOSE SURGERY      Nasal Septum deviation repair, lazer    OR REMOVAL OF OVARIAN CYST(S) Right 3/1/2018    Procedure: LAPAROSCOPIC RIGHT SALPINGOCYSTECTOMY;  Surgeon: Anna Marie Fine MD;  Location: [facility];  Service: Gynecology       Medications  Current Outpatient Medications on File Prior to Visit   Medication Sig Dispense Refill    Calcium Carb-Cholecalciferol (Calcium 1000 + D) 1000-800 MG-UNIT TABS Take by mouth (Patient not taking: Reported on 2021)      clobetasol (TEMOVATE) 0 05 % cream Apply topically 2 (two) times a day 30 g 0    ferrous sulfate 325 (65 Fe) mg tablet Take 325 mg by mouth daily with breakfast During menses (Patient not taking: Reported on 5175)      folic acid (FOLVITE) 1 mg tablet Take by mouth daily      naproxen (NAPROSYN) 500 mg tablet Take 1 tablet twice a day with food as needed p o  (Patient not taking: Reported on 2021) 20 tablet 0    triamcinolone (KENALOG) 0 1 % ointment Apply topically 2 (two) times a day 30 g 0     No current facility-administered medications on file prior to visit         Allergies:  NKDA    Family history:  Family History   Problem Relation Age of Onset    Hyperlipidemia Mother     Heart disease Mother     Heart attack Father 36    Heart disease Father     Stroke Father     Bosque's disease Maternal Grandmother     Breast cancer Neg Hx     Ovarian cancer Neg Hx     Colon cancer Neg Hx     Asthma Neg Hx     Cancer Neg Hx     Deep vein thrombosis Neg Hx     Pulmonary embolism Neg Hx     Venous thrombosis Neg Hx     Diabetes Neg Hx     Heart failure Neg Hx     Hypertension Neg Hx     Lung disease Neg Hx     Migraines Neg Hx     Miscarriages / Stillbirths Neg Hx     Seizures Neg Hx     Thyroid disease Neg Hx     Transient ischemic attack Neg Hx        Substance use:  None    Genetic history:  FOB's niece with heart defect    Infection history:  Unremarkable  + varicella        ASSESSMENT / PLAN:    1  Encounter for supervision of normal first pregnancy in first trimester  In 33 yo  at 9w4d gestation  Reviewed and provided with OB lab order  Discussed option for genetic screening with MFM which couple declines  Referral for 20 wk US placed  Reviewed PN folder/education materials  Advised to start prenatal vitamin daily  RV 4w    - Obstetric panel; Future  - Urine culture  - Hemoglobin Electrophoresis; Future  - Ambulatory Referral to Maternal Fetal Medicine; Future    2  Obesity affecting pregnancy in first trimester  Advised of healthy weight gain of 15- 20lbs    - Obstetric panel; Future  - Urine culture  - Hemoglobin Electrophoresis; Future  - Ambulatory Referral to Maternal Fetal Medicine;  Future

## 2021-08-19 ENCOUNTER — OFFICE VISIT (OUTPATIENT)
Dept: FAMILY MEDICINE CLINIC | Facility: CLINIC | Age: 26
End: 2021-08-19
Payer: COMMERCIAL

## 2021-08-19 VITALS
OXYGEN SATURATION: 98 % | HEART RATE: 76 BPM | SYSTOLIC BLOOD PRESSURE: 110 MMHG | HEIGHT: 66 IN | TEMPERATURE: 97.1 F | BODY MASS INDEX: 32.95 KG/M2 | RESPIRATION RATE: 16 BRPM | WEIGHT: 205 LBS | DIASTOLIC BLOOD PRESSURE: 76 MMHG

## 2021-08-19 DIAGNOSIS — F17.200 SMOKING: ICD-10-CM

## 2021-08-19 DIAGNOSIS — Z3A.11 11 WEEKS GESTATION OF PREGNANCY: ICD-10-CM

## 2021-08-19 DIAGNOSIS — R73.9 ELEVATED BLOOD SUGAR: Primary | ICD-10-CM

## 2021-08-19 DIAGNOSIS — R79.89 ELEVATED DHEA: ICD-10-CM

## 2021-08-19 PROCEDURE — 99213 OFFICE O/P EST LOW 20 MIN: CPT | Performed by: FAMILY MEDICINE

## 2021-08-19 NOTE — PROGRESS NOTES
Assessment/Plan:       No problem-specific Assessment & Plan notes found for this encounter  Diagnoses and all orders for this visit:    Elevated blood sugar  -     Glucose, fasting; Future  -     Hemoglobin A1C; Future    11 weeks gestation of pregnancy  Comments:  Doing well  To follow with Ob    Smoking  Comments:  Patient quit  encouraged patient not to restart smoking hookah    Elevated DHEA ([facility] [address] )  Comments:  Patient had elevated DHEA sulfate ( not significant ), but normal DHEA elevation  Patient Instructions   Follow-up  Test results      Orders Placed This Encounter   Procedures    Glucose, fasting     This is a patient instruction: This test requires patient fasting for 8 hours or longer  Standing Status:   Future     Standing Expiration Date:   8/19/2022    Hemoglobin A1C     Standing Status:   Future     Standing Expiration Date:   8/19/2022         Subjective:     Patient ID: Dao Davis is a 32 y o  female      HPI    Pregnant , 11 weeks  Doing well  Had been seen by Ob  She is going to start prenatal vitamin  Slightly abnormal blood sugar denied polyuria or polydipsia    Test results  Labs on 6-5-21  Pelvic uas done 5-25-21  Reviewed and discussed result with patient  Review of Systems   Constitutional: Negative for activity change, appetite change, chills, fatigue, fever and unexpected weight change  HENT: Negative for congestion, ear discharge, ear pain, hearing loss, nosebleeds, rhinorrhea, sinus pressure, sore throat, tinnitus, trouble swallowing and voice change  Eyes: Negative for photophobia, pain and visual disturbance  Respiratory: Negative for cough, chest tightness, shortness of breath and wheezing  Cardiovascular: Negative for chest pain, palpitations and leg swelling  Gastrointestinal: Negative for abdominal pain, anal bleeding, blood in stool, constipation, diarrhea, nausea and vomiting     Endocrine: Negative for cold intolerance, heat intolerance, polydipsia and polyuria  Genitourinary: Negative for dysuria, frequency, hematuria and urgency  Musculoskeletal: Negative for arthralgias, back pain, gait problem, joint swelling, myalgias and neck pain  Skin: Negative for rash  Neurological: Negative for dizziness, tremors, seizures, syncope, weakness, light-headedness and headaches  Hematological: Negative for adenopathy  Does not bruise/bleed easily  Psychiatric/Behavioral: Negative for agitation, behavioral problems, confusion, dysphoric mood, hallucinations and sleep disturbance  The patient is not nervous/anxious  Objective:     Physical Exam  Constitutional:       General: She is not in acute distress  Appearance: Normal appearance  She is well-developed  She is not ill-appearing or diaphoretic  HENT:      Head: Normocephalic and atraumatic  Eyes:      General: No scleral icterus  Right eye: No discharge  Left eye: No discharge  Extraocular Movements: Extraocular movements intact  Conjunctiva/sclera: Conjunctivae normal       Pupils: Pupils are equal, round, and reactive to light  Neck:      Thyroid: No thyromegaly  Vascular: No carotid bruit or JVD  Cardiovascular:      Rate and Rhythm: Normal rate and regular rhythm  Pulses:           Carotid pulses are 3+ on the right side and 3+ on the left side  Heart sounds: Normal heart sounds  No murmur heard  Comments: Extremities  No edema  Pulmonary:      Effort: Pulmonary effort is normal       Breath sounds: Normal breath sounds  Abdominal:      General: Bowel sounds are normal  There is no distension  Palpations: Abdomen is soft  There is no mass  Tenderness: There is no abdominal tenderness  There is no guarding or rebound  Hernia: No hernia is present  Musculoskeletal:      Cervical back: Neck supple  Right lower leg: No edema  Left lower leg: No edema     Lymphadenopathy:      Cervical: No cervical adenopathy  Skin:     Coloration: Skin is not jaundiced or pale  Findings: No rash  Neurological:      General: No focal deficit present  Mental Status: She is alert and oriented to person, place, and time  Cranial Nerves: No cranial nerve deficit  Sensory: No sensory deficit  Motor: No weakness or abnormal muscle tone  Coordination: Coordination normal       Gait: Gait normal    Psychiatric:         Mood and Affect: Mood normal          Behavior: Behavior normal          Thought Content:  Thought content normal          Judgment: Judgment normal

## 2021-08-26 ENCOUNTER — LAB (OUTPATIENT)
Dept: LAB | Age: 26
End: 2021-08-26
Payer: COMMERCIAL

## 2021-08-26 DIAGNOSIS — O99.211 OBESITY AFFECTING PREGNANCY IN FIRST TRIMESTER: ICD-10-CM

## 2021-08-26 DIAGNOSIS — Z34.01 ENCOUNTER FOR SUPERVISION OF NORMAL FIRST PREGNANCY IN FIRST TRIMESTER: ICD-10-CM

## 2021-08-26 DIAGNOSIS — R73.9 ELEVATED BLOOD SUGAR: ICD-10-CM

## 2021-08-26 LAB
ABO GROUP BLD: NORMAL
BASOPHILS # BLD AUTO: 0.02 THOUSANDS/ΜL (ref 0–0.1)
BASOPHILS NFR BLD AUTO: 0 % (ref 0–1)
BLD GP AB SCN SERPL QL: NEGATIVE
EOSINOPHIL # BLD AUTO: 0.1 THOUSAND/ΜL (ref 0–0.61)
EOSINOPHIL NFR BLD AUTO: 1 % (ref 0–6)
ERYTHROCYTE [DISTWIDTH] IN BLOOD BY AUTOMATED COUNT: 13.9 % (ref 11.6–15.1)
EST. AVERAGE GLUCOSE BLD GHB EST-MCNC: 100 MG/DL
EXTERNAL GENE TEST BETA-THALASSEMIA: NORMAL
HBA1C MFR BLD: 5.1 %
HCT VFR BLD AUTO: 35.4 % (ref 34.8–46.1)
HGB BLD-MCNC: 11.5 G/DL (ref 11.5–15.4)
IMM GRANULOCYTES # BLD AUTO: 0.03 THOUSAND/UL (ref 0–0.2)
IMM GRANULOCYTES NFR BLD AUTO: 0 % (ref 0–2)
LYMPHOCYTES # BLD AUTO: 2.15 THOUSANDS/ΜL (ref 0.6–4.47)
LYMPHOCYTES NFR BLD AUTO: 21 % (ref 14–44)
MCH RBC QN AUTO: 26 PG (ref 26.8–34.3)
MCHC RBC AUTO-ENTMCNC: 32.5 G/DL (ref 31.4–37.4)
MCV RBC AUTO: 80 FL (ref 82–98)
MONOCYTES # BLD AUTO: 0.54 THOUSAND/ΜL (ref 0.17–1.22)
MONOCYTES NFR BLD AUTO: 5 % (ref 4–12)
NEUTROPHILS # BLD AUTO: 7.42 THOUSANDS/ΜL (ref 1.85–7.62)
NEUTS SEG NFR BLD AUTO: 73 % (ref 43–75)
NRBC BLD AUTO-RTO: 0 /100 WBCS
PLATELET # BLD AUTO: 249 THOUSANDS/UL (ref 149–390)
PMV BLD AUTO: 10.7 FL (ref 8.9–12.7)
RBC # BLD AUTO: 4.43 MILLION/UL (ref 3.81–5.12)
RH BLD: POSITIVE
SPECIMEN EXPIRATION DATE: NORMAL
WBC # BLD AUTO: 10.26 THOUSAND/UL (ref 4.31–10.16)

## 2021-08-26 PROCEDURE — 87186 SC STD MICRODIL/AGAR DIL: CPT | Performed by: NURSE PRACTITIONER

## 2021-08-26 PROCEDURE — 87077 CULTURE AEROBIC IDENTIFY: CPT | Performed by: NURSE PRACTITIONER

## 2021-08-26 PROCEDURE — 87086 URINE CULTURE/COLONY COUNT: CPT | Performed by: NURSE PRACTITIONER

## 2021-08-26 PROCEDURE — 83020 HEMOGLOBIN ELECTROPHORESIS: CPT

## 2021-08-26 PROCEDURE — 80081 OBSTETRIC PANEL INC HIV TSTG: CPT

## 2021-08-26 PROCEDURE — 83036 HEMOGLOBIN GLYCOSYLATED A1C: CPT

## 2021-08-26 PROCEDURE — 36415 COLL VENOUS BLD VENIPUNCTURE: CPT

## 2021-08-27 LAB
HBV SURFACE AG SER QL: NORMAL
HIV 1+2 AB+HIV1 P24 AG SERPL QL IA: NORMAL
RPR SER QL: NORMAL
RUBV IGG SERPL IA-ACNC: 154 IU/ML

## 2021-08-28 LAB
BACTERIA UR CULT: ABNORMAL
BACTERIA UR CULT: ABNORMAL

## 2021-08-29 ENCOUNTER — APPOINTMENT (OUTPATIENT)
Dept: LAB | Age: 26
End: 2021-08-29
Payer: COMMERCIAL

## 2021-08-29 DIAGNOSIS — R73.9 ELEVATED BLOOD SUGAR: ICD-10-CM

## 2021-08-29 PROBLEM — O23.41 UTI (URINARY TRACT INFECTION) DURING PREGNANCY, FIRST TRIMESTER: Status: ACTIVE | Noted: 2021-08-29

## 2021-08-29 LAB — GLUCOSE P FAST SERPL-MCNC: 91 MG/DL (ref 65–99)

## 2021-08-29 PROCEDURE — 82947 ASSAY GLUCOSE BLOOD QUANT: CPT

## 2021-08-29 PROCEDURE — 36415 COLL VENOUS BLD VENIPUNCTURE: CPT

## 2021-08-29 NOTE — RESULT ENCOUNTER NOTE
Urine culture is positive for entero faecalis in pregnant patient  Please inform her of same  I will send a prescription for nitrofurantoin to her pharmacy on record  I informed her via [employer] as well

## 2021-08-31 ENCOUNTER — TELEPHONE (OUTPATIENT)
Dept: FAMILY MEDICINE CLINIC | Facility: CLINIC | Age: 26
End: 2021-08-31

## 2021-08-31 LAB
HGB A MFR BLD: 2.3 % (ref 1.8–3.2)
HGB A MFR BLD: 97.7 % (ref 96.4–98.8)
HGB F MFR BLD: 0 % (ref 0–2)
HGB FRACT BLD-IMP: NORMAL
HGB S MFR BLD: 0 %

## 2021-09-01 ENCOUNTER — OB ABSTRACT (OUTPATIENT)
Dept: OBGYN CLINIC | Facility: CLINIC | Age: 26
End: 2021-09-01

## 2021-09-03 ENCOUNTER — TELEPHONE (OUTPATIENT)
Dept: PERINATAL CARE | Facility: CLINIC | Age: 26
End: 2021-09-03

## 2021-09-03 NOTE — TELEPHONE ENCOUNTER
Phone call to patient and left M to confirm [facility] NT US appt :  [facility] sent you an important message via [facility]  Message contains a video link by [employer] that explains genetic testing verus screening and information on how to check your insurance coverage for specialty genetic labs  We encourage you to review this information prior to your [facility] US appointment and please bring New patient paper work  After viewing video if you have any questions please call the nurse line @ # [phone]  We look forward to seeing you soon at [facility]

## 2021-09-07 ENCOUNTER — ROUTINE PRENATAL (OUTPATIENT)
Dept: OBGYN CLINIC | Facility: CLINIC | Age: 26
End: 2021-09-07

## 2021-09-07 VITALS
WEIGHT: 202.4 LBS | SYSTOLIC BLOOD PRESSURE: 118 MMHG | DIASTOLIC BLOOD PRESSURE: 64 MMHG | BODY MASS INDEX: 32.53 KG/M2 | HEIGHT: 66 IN

## 2021-09-07 DIAGNOSIS — O23.41 UTI (URINARY TRACT INFECTION) DURING PREGNANCY, FIRST TRIMESTER: Primary | ICD-10-CM

## 2021-09-07 PROCEDURE — PNV: Performed by: NURSE PRACTITIONER

## 2021-09-07 NOTE — PROGRESS NOTES
31 yo  at 40I1A gestation, complicated by obesity, 1st trimester UTI     21 UTI, treated  Admits to not taking full course of antibiotic, missed about 2 days  Counseled about importance of always completing full course  Needs test of cure  Lab order provided    21 G/C negative; 2021 pap negative  PN labs normal; HgbA1c and fasting glc normal     S=D, normal FHTs, normal BP    RV 4w

## 2021-09-09 ENCOUNTER — ROUTINE PRENATAL (OUTPATIENT)
Dept: PERINATAL CARE | Facility: CLINIC | Age: 26
End: 2021-09-09
Payer: COMMERCIAL

## 2021-09-09 VITALS
BODY MASS INDEX: 32.47 KG/M2 | DIASTOLIC BLOOD PRESSURE: 59 MMHG | SYSTOLIC BLOOD PRESSURE: 115 MMHG | WEIGHT: 202 LBS | HEART RATE: 77 BPM | HEIGHT: 66 IN

## 2021-09-09 DIAGNOSIS — Z36.82 NUCHAL TRANSLUCENCY OF FETUS ON PRENATAL ULTRASOUND: ICD-10-CM

## 2021-09-09 DIAGNOSIS — Z3A.13 PREGNANCY WITH 13 COMPLETED WEEKS GESTATION: ICD-10-CM

## 2021-09-09 DIAGNOSIS — O99.211 OBESITY AFFECTING PREGNANCY IN FIRST TRIMESTER: Primary | ICD-10-CM

## 2021-09-09 DIAGNOSIS — Z34.01 ENCOUNTER FOR SUPERVISION OF NORMAL FIRST PREGNANCY IN FIRST TRIMESTER: ICD-10-CM

## 2021-09-09 PROCEDURE — 76801 OB US < 14 WKS SINGLE FETUS: CPT | Performed by: OBSTETRICS & GYNECOLOGY

## 2021-09-09 PROCEDURE — 76813 OB US NUCHAL MEAS 1 GEST: CPT | Performed by: OBSTETRICS & GYNECOLOGY

## 2021-09-09 PROCEDURE — 99203 OFFICE O/P NEW LOW 30 MIN: CPT | Performed by: OBSTETRICS & GYNECOLOGY

## 2021-09-09 RX ORDER — LANOLIN ALCOHOL/MO/W.PET/CERES
50 CREAM (GRAM) TOPICAL DAILY
COMMUNITY
End: 2022-02-01

## 2021-09-09 RX ORDER — ASPIRIN 81 MG/1
162 TABLET, CHEWABLE ORAL DAILY
Qty: 60 TABLET | Refills: 6 | Status: SHIPPED | OUTPATIENT
Start: 2021-09-09 | End: 2022-03-14 | Stop reason: HOSPADM

## 2021-09-09 NOTE — PROGRESS NOTES
OFFICE CONSULT  Referring physician:   Danny Marin [address]  [address] [place], [place] [address]      Dear Marquise Horner      Thank you for requesting a  consultation on your patient Ms Jacy Saleem for the following indications:  Genetic screening    History    She currently is on prenatal vitamins and doxylamine for nausea  She denies any known drug allergies  Her past medical history significant for an elevated BMI of 32  She had an 11 centimeter fallopian tube cyst removed on the right in 2018 and nasal septum surgery in the past   1st generation family history includes that her father had a stroke at age 36 and  and she states they think it was secondary to his smoking history  She reports that her mother then develop some heart issues because of her sadness from the loss of her  but she denies reporting that her mother had a heart attack  I updated her family history  She reports she completed the COVID vaccine  Ultrasound findings: The ultrasound shows a fetus concordant with dates  The nasal bone and nuchal translucency appears normal  No malformations are seen on today's early ultrasound  The patient was informed of the findings and counseled about the limitations of the exam in detecting all forms of fetal congenital abnormalities  She does not report any vaginal bleeding or uterine cramping or contractions  Specific counseling was provided on the following problems:  1  We discussed the options for genetic screening which include invasive testing on the fetal placenta or on fetal skin cells within the amniotic fluid and compared this to noninvasive testing which includes cell free DNA screening and the sequential screen  We reviewed the risks, the benefits and the limitations of each  In the end patient chose to complete the cell free DNA screen  Future tests recommended:  1   The results of her NIPT will return in 7-10 days and her OB office will order an MSAFP screen at 16-18 weeks to screen for spina bifida  2    Future ultrasounds ordered today:   1  Fetal Level II ultrasound imaging is recommended at 19-20 weeks' gestation  2   Recommend starting a baby aspirin 162 milligrams daily till 36 weeks of pregnancy to decrease her risk of developing preeclampsia based on risk factors of her 1st pregnancy and a BMI greater than 30  The face to face time, in addition to time spent discussing ultrasound results, was approximately 15 minutes, greater than 50% of which was spent during counseling and coordination of care       Alfredito Juárez MD

## 2021-09-09 NOTE — LETTER
2021     Millie Biggs, [address] [facility]  [place]   [address]  [phone]    Patient: Janette Davis   YOB: 1995   Date of Visit: 2021       Dear Dr Jenkins Aver: Thank you for referring Janette Davis to me for evaluation  Below are my notes for this consultation  If you have questions, please do not hesitate to call me  I look forward to following your patient along with you  Sincerely,        Lanie Sullivan MD        CC: No Recipients  Lanie Sullivan MD  2021 10:55 PM  Sign when Signing Visit  OFFICE CONSULT  Referring physician:   Danny Goetz [address]  [address] [place], [place] [address]      Dear Millie Biggs      Thank you for requesting a  consultation on your patient Ms Janette Davis for the following indications:  Genetic screening    History    She currently is on prenatal vitamins and doxylamine for nausea  She denies any known drug allergies  Her past medical history significant for an elevated BMI of 32  She had an 11 centimeter fallopian tube cyst removed on the right in 2018 and nasal septum surgery in the past   1st generation family history includes that her father had a stroke at age 36 and  and she states they think it was secondary to his smoking history  She reports that her mother then develop some heart issues because of her sadness from the loss of her  but she denies reporting that her mother had a heart attack  I updated her family history  She reports she completed the COVID vaccine  Ultrasound findings: The ultrasound shows a fetus concordant with dates  The nasal bone and nuchal translucency appears normal  No malformations are seen on today's early ultrasound  The patient was informed of the findings and counseled about the limitations of the exam in detecting all forms of fetal congenital abnormalities  She does not report any vaginal bleeding or uterine cramping or contractions  Specific counseling was provided on the following problems:  1  We discussed the options for genetic screening which include invasive testing on the fetal placenta or on fetal skin cells within the amniotic fluid and compared this to noninvasive testing which includes cell free DNA screening and the sequential screen  We reviewed the risks, the benefits and the limitations of each  In the end patient chose to complete the cell free DNA screen  Future tests recommended:  1  The results of her NIPT will return in 7-10 days and her OB office will order an MSAFP screen at 16-18 weeks to screen for spina bifida  2    Future ultrasounds ordered today:   1  Fetal Level II ultrasound imaging is recommended at 19-20 weeks' gestation  2   Recommend starting a baby aspirin 162 milligrams daily till 36 weeks of pregnancy to decrease her risk of developing preeclampsia based on risk factors of her 1st pregnancy and a BMI greater than 30  The face to face time, in addition to time spent discussing ultrasound results, was approximately 15 minutes, greater than 50% of which was spent during counseling and coordination of care       Jeimy Zelaya MD

## 2021-09-09 NOTE — PROGRESS NOTES
Patient chose to have [address] screen  Instructed patient on process for checking her OOP cost via [employer] [facility]  Provided [id number] instruction card toll free # [phone]  Patient made aware if [id number]  unable to give an estimate she will need to contact M office prior to blood draw  Patient aware that  is provided by third party and is only an estimate of cost not a guarantee  Insurance may require prior authorization, if test drawn without prior authorization she will be responsible for full cost of test   For definitive OOP cost, lab deductible or if lab authorization is required patient encouraged to call her insurance provider  Explained customer service insurance phone # located on the back of her ID card  [facility]etal [facility] will have results in approximately 7-10 business days and will call patient or notify via [address]  Patient aware viewing lab result reveals gender  [id number] lab kit with prepaid [employer]  given to patient  Patient verbalized understanding of all instructions and no questions at this time

## 2021-09-09 NOTE — LETTER
2021     Nancylee Kanner, [address] [facility]   [address]  [phone]    Patient: Roseline Negro   YOB: 1995   Date of Visit: 2021       Dear Dr Renu Sun: Thank you for referring Roseline Negro to me for evaluation  Below are my notes for this consultation  If you have questions, please do not hesitate to call me  I look forward to following your patient along with you  Sincerely,        Sandro Nelson MD        CC: No Recipients  Sandro Nelson MD  2021 10:55 PM  Sign when Signing Visit  OFFICE CONSULT  Referring physician:   Nancylee Kanner, [address]  [address] [place], [place] [address]      Dear Nancylee Kanner      Thank you for requesting a  consultation on your patient Ms Roseline Negro for the following indications:  Genetic screening    History    She currently is on prenatal vitamins and doxylamine for nausea  She denies any known drug allergies  Her past medical history significant for an elevated BMI of 32  She had an 11 centimeter fallopian tube cyst removed on the right in 2018 and nasal septum surgery in the past   1st generation family history includes that her father had a stroke at age 36 and  and she states they think it was secondary to his smoking history  She reports that her mother then develop some heart issues because of her sadness from the loss of her  but she denies reporting that her mother had a heart attack  I updated her family history  She reports she completed the COVID vaccine  Ultrasound findings: The ultrasound shows a fetus concordant with dates  The nasal bone and nuchal translucency appears normal  No malformations are seen on today's early ultrasound  The patient was informed of the findings and counseled about the limitations of the exam in detecting all forms of fetal congenital abnormalities  She does not report any vaginal bleeding or uterine cramping or contractions  Specific counseling was provided on the following problems:  1  We discussed the options for genetic screening which include invasive testing on the fetal placenta or on fetal skin cells within the amniotic fluid and compared this to noninvasive testing which includes cell free DNA screening and the sequential screen  We reviewed the risks, the benefits and the limitations of each  In the end patient chose to complete the cell free DNA screen  Future tests recommended:  1  The results of her NIPT will return in 7-10 days and her OB office will order an MSAFP screen at 16-18 weeks to screen for spina bifida  2    Future ultrasounds ordered today:   1  Fetal Level II ultrasound imaging is recommended at 19-20 weeks' gestation  2   Recommend starting a baby aspirin 162 milligrams daily till 36 weeks of pregnancy to decrease her risk of developing preeclampsia based on risk factors of her 1st pregnancy and a BMI greater than 30  The face to face time, in addition to time spent discussing ultrasound results, was approximately 15 minutes, greater than 50% of which was spent during counseling and coordination of care       Sandy Isaacs MD

## 2021-09-15 ENCOUNTER — APPOINTMENT (OUTPATIENT)
Dept: LAB | Age: 26
End: 2021-09-15
Payer: COMMERCIAL

## 2021-09-15 PROCEDURE — 87086 URINE CULTURE/COLONY COUNT: CPT | Performed by: NURSE PRACTITIONER

## 2021-09-17 LAB — BACTERIA UR CULT: NORMAL

## 2021-10-05 ENCOUNTER — ROUTINE PRENATAL (OUTPATIENT)
Dept: OBGYN CLINIC | Facility: CLINIC | Age: 26
End: 2021-10-05

## 2021-10-05 VITALS
HEIGHT: 66 IN | WEIGHT: 203 LBS | BODY MASS INDEX: 32.62 KG/M2 | SYSTOLIC BLOOD PRESSURE: 116 MMHG | HEART RATE: 95 BPM | DIASTOLIC BLOOD PRESSURE: 74 MMHG

## 2021-10-05 DIAGNOSIS — O99.212 OBESITY AFFECTING PREGNANCY IN SECOND TRIMESTER: ICD-10-CM

## 2021-10-05 PROCEDURE — PNV: Performed by: NURSE PRACTITIONER

## 2021-10-26 ENCOUNTER — ROUTINE PRENATAL (OUTPATIENT)
Dept: PERINATAL CARE | Facility: CLINIC | Age: 26
End: 2021-10-26
Payer: COMMERCIAL

## 2021-10-26 VITALS
WEIGHT: 206.8 LBS | HEART RATE: 82 BPM | HEIGHT: 66 IN | BODY MASS INDEX: 33.23 KG/M2 | SYSTOLIC BLOOD PRESSURE: 118 MMHG | DIASTOLIC BLOOD PRESSURE: 58 MMHG

## 2021-10-26 DIAGNOSIS — O99.212 OBESITY AFFECTING PREGNANCY IN SECOND TRIMESTER: Primary | ICD-10-CM

## 2021-10-26 DIAGNOSIS — Z3A.21 21 WEEKS GESTATION OF PREGNANCY: ICD-10-CM

## 2021-10-26 DIAGNOSIS — Z3A.20 20 WEEKS GESTATION OF PREGNANCY: ICD-10-CM

## 2021-10-26 DIAGNOSIS — Z36.86 ENCOUNTER FOR ANTENATAL SCREENING FOR CERVICAL LENGTH: ICD-10-CM

## 2021-10-26 PROCEDURE — 3008F BODY MASS INDEX DOCD: CPT | Performed by: OBSTETRICS & GYNECOLOGY

## 2021-10-26 PROCEDURE — 76817 TRANSVAGINAL US OBSTETRIC: CPT | Performed by: OBSTETRICS & GYNECOLOGY

## 2021-10-26 PROCEDURE — 99213 OFFICE O/P EST LOW 20 MIN: CPT | Performed by: OBSTETRICS & GYNECOLOGY

## 2021-10-26 PROCEDURE — 76811 OB US DETAILED SNGL FETUS: CPT | Performed by: OBSTETRICS & GYNECOLOGY

## 2021-11-01 PROBLEM — O99.212 OBESITY AFFECTING PREGNANCY IN SECOND TRIMESTER: Status: ACTIVE | Noted: 2021-08-11

## 2021-11-02 ENCOUNTER — ROUTINE PRENATAL (OUTPATIENT)
Dept: OBGYN CLINIC | Facility: CLINIC | Age: 26
End: 2021-11-02
Payer: COMMERCIAL

## 2021-11-02 VITALS
BODY MASS INDEX: 32.71 KG/M2 | WEIGHT: 208.4 LBS | HEIGHT: 67 IN | HEART RATE: 88 BPM | DIASTOLIC BLOOD PRESSURE: 66 MMHG | SYSTOLIC BLOOD PRESSURE: 118 MMHG

## 2021-11-02 DIAGNOSIS — O99.212 OBESITY AFFECTING PREGNANCY IN SECOND TRIMESTER: Primary | ICD-10-CM

## 2021-11-02 DIAGNOSIS — Z23 NEED FOR INFLUENZA VACCINATION: ICD-10-CM

## 2021-11-02 PROCEDURE — PNV: Performed by: OBSTETRICS & GYNECOLOGY

## 2021-11-02 PROCEDURE — 3008F BODY MASS INDEX DOCD: CPT | Performed by: OBSTETRICS & GYNECOLOGY

## 2021-11-02 PROCEDURE — 90686 IIV4 VACC NO PRSV 0.5 ML IM: CPT

## 2021-11-02 PROCEDURE — 90471 IMMUNIZATION ADMIN: CPT

## 2021-12-01 ENCOUNTER — ROUTINE PRENATAL (OUTPATIENT)
Dept: OBGYN CLINIC | Facility: CLINIC | Age: 26
End: 2021-12-01
Payer: COMMERCIAL

## 2021-12-01 VITALS
DIASTOLIC BLOOD PRESSURE: 56 MMHG | OXYGEN SATURATION: 98 % | SYSTOLIC BLOOD PRESSURE: 112 MMHG | HEART RATE: 96 BPM | WEIGHT: 209 LBS | HEIGHT: 67 IN | BODY MASS INDEX: 32.8 KG/M2

## 2021-12-01 DIAGNOSIS — O99.212 OBESITY AFFECTING PREGNANCY IN SECOND TRIMESTER: Primary | ICD-10-CM

## 2021-12-01 PROCEDURE — 99213 OFFICE O/P EST LOW 20 MIN: CPT | Performed by: OBSTETRICS & GYNECOLOGY

## 2021-12-19 ENCOUNTER — APPOINTMENT (OUTPATIENT)
Dept: LAB | Age: 26
End: 2021-12-19
Payer: COMMERCIAL

## 2021-12-19 DIAGNOSIS — O99.212 OBESITY AFFECTING PREGNANCY IN SECOND TRIMESTER: ICD-10-CM

## 2021-12-19 LAB
ERYTHROCYTE [DISTWIDTH] IN BLOOD BY AUTOMATED COUNT: 13.8 % (ref 11.6–15.1)
GLUCOSE 1H P 100 G GLC PO SERPL-MCNC: 157 MG/DL (ref 65–179)
HCT VFR BLD AUTO: 34.5 % (ref 34.8–46.1)
HGB BLD-MCNC: 10.9 G/DL (ref 11.5–15.4)
MCH RBC QN AUTO: 25.7 PG (ref 26.8–34.3)
MCHC RBC AUTO-ENTMCNC: 31.6 G/DL (ref 31.4–37.4)
MCV RBC AUTO: 81 FL (ref 82–98)
PLATELET # BLD AUTO: 202 THOUSANDS/UL (ref 149–390)
PMV BLD AUTO: 11.6 FL (ref 8.9–12.7)
RBC # BLD AUTO: 4.24 MILLION/UL (ref 3.81–5.12)
WBC # BLD AUTO: 9.52 THOUSAND/UL (ref 4.31–10.16)

## 2021-12-19 PROCEDURE — 86592 SYPHILIS TEST NON-TREP QUAL: CPT

## 2021-12-19 PROCEDURE — 85027 COMPLETE CBC AUTOMATED: CPT

## 2021-12-19 PROCEDURE — 36415 COLL VENOUS BLD VENIPUNCTURE: CPT

## 2021-12-20 LAB — RPR SER QL: NORMAL

## 2021-12-21 ENCOUNTER — ROUTINE PRENATAL (OUTPATIENT)
Dept: OBGYN CLINIC | Facility: CLINIC | Age: 26
End: 2021-12-21

## 2021-12-21 VITALS
BODY MASS INDEX: 33.37 KG/M2 | DIASTOLIC BLOOD PRESSURE: 60 MMHG | SYSTOLIC BLOOD PRESSURE: 118 MMHG | WEIGHT: 212.6 LBS | HEIGHT: 67 IN

## 2021-12-21 DIAGNOSIS — O99.213 OBESITY AFFECTING PREGNANCY IN THIRD TRIMESTER: Primary | ICD-10-CM

## 2021-12-21 DIAGNOSIS — O99.810 ABNORMAL GLUCOSE TOLERANCE TEST (GTT) DURING PREGNANCY, ANTEPARTUM: ICD-10-CM

## 2021-12-21 DIAGNOSIS — O99.013 ANEMIA, ANTEPARTUM, THIRD TRIMESTER: ICD-10-CM

## 2021-12-21 PROCEDURE — PNV: Performed by: OBSTETRICS & GYNECOLOGY

## 2021-12-21 PROCEDURE — 3008F BODY MASS INDEX DOCD: CPT | Performed by: OBSTETRICS & GYNECOLOGY

## 2022-01-03 ENCOUNTER — APPOINTMENT (OUTPATIENT)
Dept: LAB | Facility: HOSPITAL | Age: 27
End: 2022-01-03
Attending: OBSTETRICS & GYNECOLOGY
Payer: COMMERCIAL

## 2022-01-05 ENCOUNTER — ROUTINE PRENATAL (OUTPATIENT)
Dept: OBGYN CLINIC | Facility: CLINIC | Age: 27
End: 2022-01-05
Payer: COMMERCIAL

## 2022-01-05 VITALS
BODY MASS INDEX: 33.59 KG/M2 | HEIGHT: 67 IN | SYSTOLIC BLOOD PRESSURE: 118 MMHG | WEIGHT: 214 LBS | DIASTOLIC BLOOD PRESSURE: 70 MMHG

## 2022-01-05 DIAGNOSIS — O99.213 OBESITY AFFECTING PREGNANCY IN THIRD TRIMESTER: Primary | ICD-10-CM

## 2022-01-05 DIAGNOSIS — O99.013 ANEMIA, ANTEPARTUM, THIRD TRIMESTER: ICD-10-CM

## 2022-01-05 DIAGNOSIS — N89.8 VAGINAL DISCHARGE: ICD-10-CM

## 2022-01-05 DIAGNOSIS — N89.8 LEUKORRHEA, VAGINAL, NONINFECTIOUS: ICD-10-CM

## 2022-01-05 LAB
BV WHIFF TEST VAG QL: NEGATIVE
CLUE CELLS SPEC QL WET PREP: NORMAL
PH SMN: 4.5 [PH]
SL AMB POCT WET MOUNT: NEGATIVE
T VAGINALIS VAG QL WET PREP: NORMAL
YEAST VAG QL WET PREP: NEGATIVE

## 2022-01-05 PROCEDURE — 87210 SMEAR WET MOUNT SALINE/INK: CPT | Performed by: OBSTETRICS & GYNECOLOGY

## 2022-01-05 PROCEDURE — PNV: Performed by: OBSTETRICS & GYNECOLOGY

## 2022-01-05 PROCEDURE — 3008F BODY MASS INDEX DOCD: CPT | Performed by: FAMILY MEDICINE

## 2022-01-05 NOTE — PROGRESS NOTES
Pt is a 32 y o  Mckenna Morteza 30w4d  Pregnancy is complicated by obesity, anemia, abnormal 1 hour gtt  Pt notified that 3 hour GTT wnl for 4/4 values  Pt reports +FM  Denies vb, lof, ctx  PTL precautions and fkc reviewed  She also reports she has a vaginal odor with a discharge--concerned she has an infection  Wet mount/katie negative for yeast, bv and trichomonas--pt reassured  F/u 2 weeks   TDAP next visit  Also advised she is eligible for COVID booster and I recommend it

## 2022-01-16 ENCOUNTER — HOSPITAL ENCOUNTER (EMERGENCY)
Facility: HOSPITAL | Age: 27
Discharge: HOME | End: 2022-01-16
Attending: EMERGENCY MEDICINE
Payer: COMMERCIAL

## 2022-01-16 VITALS
BODY MASS INDEX: 33.52 KG/M2 | RESPIRATION RATE: 17 BRPM | SYSTOLIC BLOOD PRESSURE: 131 MMHG | WEIGHT: 214 LBS | TEMPERATURE: 98.5 F | HEART RATE: 134 BPM | OXYGEN SATURATION: 99 % | DIASTOLIC BLOOD PRESSURE: 61 MMHG

## 2022-01-16 DIAGNOSIS — Z34.93 THIRD TRIMESTER PREGNANCY: ICD-10-CM

## 2022-01-16 DIAGNOSIS — R07.9 CHEST PAIN, UNSPECIFIED TYPE: Primary | ICD-10-CM

## 2022-01-16 DIAGNOSIS — R05.9 COUGH: ICD-10-CM

## 2022-01-16 DIAGNOSIS — J02.9 SORE THROAT: ICD-10-CM

## 2022-01-16 LAB
ANION GAP SERPL CALCULATED.3IONS-SCNC: 5 MMOL/L (ref 4–13)
BASOPHILS # BLD AUTO: 0.01 THOUSANDS/ΜL (ref 0–0.1)
BASOPHILS NFR BLD AUTO: 0 % (ref 0–1)
BUN SERPL-MCNC: 7 MG/DL (ref 5–25)
CALCIUM SERPL-MCNC: 8.2 MG/DL (ref 8.3–10.1)
CHLORIDE SERPL-SCNC: 110 MMOL/L (ref 100–108)
CO2 SERPL-SCNC: 24 MMOL/L (ref 21–32)
CREAT SERPL-MCNC: 0.46 MG/DL (ref 0.6–1.3)
EOSINOPHIL # BLD AUTO: 0.05 THOUSAND/ΜL (ref 0–0.61)
EOSINOPHIL NFR BLD AUTO: 1 % (ref 0–6)
ERYTHROCYTE [DISTWIDTH] IN BLOOD BY AUTOMATED COUNT: 15 % (ref 11.6–15.1)
FLUAV RNA RESP QL NAA+PROBE: NEGATIVE
FLUBV RNA RESP QL NAA+PROBE: NEGATIVE
GFR SERPL CREATININE-BSD FRML MDRD: 137 ML/MIN/1.73SQ M
GLUCOSE SERPL-MCNC: 86 MG/DL (ref 65–140)
HCT VFR BLD AUTO: 33.1 % (ref 34.8–46.1)
HGB BLD-MCNC: 10.6 G/DL (ref 11.5–15.4)
IMM GRANULOCYTES # BLD AUTO: 0.05 THOUSAND/UL (ref 0–0.2)
IMM GRANULOCYTES NFR BLD AUTO: 1 % (ref 0–2)
LYMPHOCYTES # BLD AUTO: 0.54 THOUSANDS/ΜL (ref 0.6–4.47)
LYMPHOCYTES NFR BLD AUTO: 6 % (ref 14–44)
MCH RBC QN AUTO: 25.4 PG (ref 26.8–34.3)
MCHC RBC AUTO-ENTMCNC: 32 G/DL (ref 31.4–37.4)
MCV RBC AUTO: 79 FL (ref 82–98)
MONOCYTES # BLD AUTO: 0.86 THOUSAND/ΜL (ref 0.17–1.22)
MONOCYTES NFR BLD AUTO: 10 % (ref 4–12)
NEUTROPHILS # BLD AUTO: 6.93 THOUSANDS/ΜL (ref 1.85–7.62)
NEUTS SEG NFR BLD AUTO: 82 % (ref 43–75)
NRBC BLD AUTO-RTO: 0 /100 WBCS
PLATELET # BLD AUTO: 166 THOUSANDS/UL (ref 149–390)
PMV BLD AUTO: 10.8 FL (ref 8.9–12.7)
POTASSIUM SERPL-SCNC: 3.6 MMOL/L (ref 3.5–5.3)
RBC # BLD AUTO: 4.18 MILLION/UL (ref 3.81–5.12)
RSV RNA RESP QL NAA+PROBE: NEGATIVE
SARS-COV-2 RNA RESP QL NAA+PROBE: POSITIVE
SODIUM SERPL-SCNC: 139 MMOL/L (ref 136–145)
WBC # BLD AUTO: 8.44 THOUSAND/UL (ref 4.31–10.16)

## 2022-01-16 PROCEDURE — 80048 BASIC METABOLIC PNL TOTAL CA: CPT | Performed by: EMERGENCY MEDICINE

## 2022-01-16 PROCEDURE — 96361 HYDRATE IV INFUSION ADD-ON: CPT

## 2022-01-16 PROCEDURE — 96360 HYDRATION IV INFUSION INIT: CPT

## 2022-01-16 PROCEDURE — 93005 ELECTROCARDIOGRAM TRACING: CPT

## 2022-01-16 PROCEDURE — 99285 EMERGENCY DEPT VISIT HI MDM: CPT | Performed by: EMERGENCY MEDICINE

## 2022-01-16 PROCEDURE — 0241U HB NFCT DS VIR RESP RNA 4 TRGT: CPT | Performed by: EMERGENCY MEDICINE

## 2022-01-16 PROCEDURE — 99285 EMERGENCY DEPT VISIT HI MDM: CPT

## 2022-01-16 PROCEDURE — 76815 OB US LIMITED FETUS(S): CPT | Performed by: EMERGENCY MEDICINE

## 2022-01-16 PROCEDURE — 36415 COLL VENOUS BLD VENIPUNCTURE: CPT | Performed by: EMERGENCY MEDICINE

## 2022-01-16 PROCEDURE — 85025 COMPLETE CBC W/AUTO DIFF WBC: CPT | Performed by: EMERGENCY MEDICINE

## 2022-01-16 RX ADMIN — SODIUM CHLORIDE 1000 ML: 0.9 INJECTION, SOLUTION INTRAVENOUS at 11:55

## 2022-01-16 NOTE — ED ATTENDING ATTESTATION
Final Diagnosis:  1  Chest pain, unspecified type    2  Third trimester pregnancy    3  Cough    4  Sore throat      ED Course as of 22 1554   Sun 2022   1311 SARS-COV-2(!): Positive   1311 HR currently 105   106 on EKG  Britta Gutiérrez MD, saw and evaluated the patient  All available labs and X-rays were ordered by me or the resident and have been reviewed by myself  I discussed the patient with the resident / non-physician and agree with the resident's / non-physician practitioner's findings and plan as documented in the resident's / non-physician practicitioner's note, except where noted  At this point, I agree with the current assessment done in the ED  I was present during key portions of all procedures performed unless otherwise stated  Chief Complaint   Patient presents with    Chest Pain     Pt c/o chest pain, cough, fever, and sore throat since yesterday  Pt denies COVID contact  Pt is 32 weeks pregnant     This is a 32 y o  female presenting for evaluation of viral syndrome   32 weeks pregnant  2 days of central pressure  +cough yesterday  +nausea/vomiting  +sore throat  Multiple sick contacts with COVID at work  Only 1 vaccine dose  No vaginal beeding  +fetal movements  Unremarkable exam otherwise  Mild injection of orophyarnx  Works as an   Non-tender abdomen  132 FHR with active fetal movement  PMH:   has a past medical history of Anal itching, Chicken pox, Cyst of fallopian tube (2017), Generalized headaches, Need for HPV vaccination, and Psoriasis  PSH:   has a past surgical history that includes Nose surgery and pr removal of ovarian cyst(s) (Right, 3/1/2018)      Social:  Social History     Substance and Sexual Activity   Alcohol Use Not Currently    Comment: occasional- stopped     Social History     Tobacco Use   Smoking Status Former Smoker   Smokeless Tobacco Current User   Tobacco Comment    pt quit smoking Melani Social History     Substance and Sexual Activity   Drug Use No     PE:  Vitals:    01/16/22 1112 01/16/22 1113   BP:  131/61   BP Location:  Right arm   Pulse:  (!) 134   Resp:  17   Temp: 98 5 °F (36 9 °C)    TempSrc: Oral    SpO2:  99%   Weight:  97 1 kg (214 lb)   General: VSS, NAD, awake, alert  Well-nourished, well-developed  Appears stated age  Head: Normocephalic, atraumatic, nontender  Eyes: PERRL, EOM-I  No diplopia  No hyphema  No subconjunctival hemorrhages  Symmetrical lids  ENTAtraumatic external nose and ears  Dry MM  No stridor  Normal phonation  No drooling  Base of mouth is soft  No mastoid tenderness  Neck: Symmetric, trachea midline  No JVD  CV: Peripheral pulses +2 throughout  No chest wall tenderness  Tachycardic -130s  Lungs:   Unlabored   No retractions  No crepitus  No tachypnea  No paradoxical motion  Abd: +BS, soft, NT/ND    MSK:   FROM   No lower extremity edema  Back:   No CVAT  Skin: Dry, intact  Neuro: AAOx3, GCS 15, CN II-XII grossly intact  Motor grossly intact  Psychiatric/Behavioral: Appropriate mood and affect   Exam: deferred  A:  - Viral syndrome  P:  - Symptomatic measures  - EKG  - basic labs b/c pregnant  - likely DC home  - COVID test      - 13 point ROS was performed and all are normal unless stated in the history above  - Nursing note reviewed  Vitals reviewed  - Orders placed by myself and/or advanced practitioner / resident     - Previous chart was reviewed  - No language barrier    - History obtained from patient  - There are no limitations to the history obtained  - Critical care time: Not applicable for this patient       Code Status: No Order  Advance Directive and Living Will:      Power of :    POLST:      Medications   sodium chloride 0 9 % bolus 1,000 mL (0 mL Intravenous Stopped 1/16/22 1338)     No orders to display     Orders Placed This Encounter   Procedures    Pelvic Ultrasound COVID/FLU/RSV - 2 hour TAT    CBC and differential    Basic metabolic panel    ECG 12 lead     Labs Reviewed   COVID19, INFLUENZA A/B, RSV PCR, [facility] - Abnormal       Result Value Ref Range Status    SARS-CoV-2 Positive (*) Negative Final    Comment:      INFLUENZA A PCR Negative  Negative Final    Comment:      INFLUENZA B PCR Negative  Negative Final    Comment:      RSV PCR Negative  Negative Final    Comment:      Narrative:     FOR PEDIATRIC PATIENTS - copy/paste COVID Guidelines URL to browser: [email or web address]/  MenuSpringx    SARS-CoV-2 assay is a Nucleic Acid Amplification assay intended for the  qualitative detection of nucleic acid from SARS-CoV-2 in nasopharyngeal  swabs  Results are for the presumptive identification of SARS-CoV-2 RNA  Positive results are indicative of infection with SARS-CoV-2, the virus  causing COVID-19, but do not rule out bacterial infection or co-infection  with other viruses  Laboratories within the [place] and its  territories are required to report all positive results to the appropriate  public health authorities  Negative results do not preclude SARS-CoV-2  infection and should not be used as the sole basis for treatment or other  patient management decisions  Negative results must be combined with  clinical observations, patient history, and epidemiological information  This test has not been FDA cleared or approved  This test has been authorized by FDA under an Emergency Use Authorization  (EUA)  This test is only authorized for the duration of time the  declaration that circumstances exist justifying the authorization of the  emergency use of an in vitro diagnostic tests for detection of SARS-CoV-2  virus and/or diagnosis of COVID-19 infection under section 564(b)(1) of  the Act, 21 U  S C  771CKX-9(M)(5), unless the authorization is terminated  or revoked sooner   The test has been validated but independent review by FDA  and CLIA is pending  Test performed using [facility] GeneXpert: This RT-PCR assay targets N2,  a region unique to SARS-CoV-2  A conserved region in the E-gene was chosen  for pan-Sarbecovirus detection which includes SARS-CoV-2  CBC AND DIFFERENTIAL - Abnormal    WBC 8 44  4 31 - 10 16 Thousand/uL Final    RBC 4 18  3 81 - 5 12 Million/uL Final    Hemoglobin 10 6 (*) 11 5 - 15 4 g/dL Final    Hematocrit 33 1 (*) 34 8 - 46 1 % Final    MCV 79 (*) 82 - 98 fL Final    MCH 25 4 (*) 26 8 - 34 3 pg Final    MCHC 32 0  31 4 - 37 4 g/dL Final    RDW 15 0  11 6 - 15 1 % Final    MPV 10 8  8 9 - 12 7 fL Final    Platelets 392  217 - 390 Thousands/uL Final    nRBC 0  /100 WBCs Final    Neutrophils Relative 82 (*) 43 - 75 % Final    Immat GRANS % 1  0 - 2 % Final    Lymphocytes Relative 6 (*) 14 - 44 % Final    Monocytes Relative 10  4 - 12 % Final    Eosinophils Relative 1  0 - 6 % Final    Basophils Relative 0  0 - 1 % Final    Neutrophils Absolute 6 93  1 85 - 7 62 Thousands/µL Final    Immature Grans Absolute 0 05  0 00 - 0 20 Thousand/uL Final    Lymphocytes Absolute 0 54 (*) 0 60 - 4 47 Thousands/µL Final    Monocytes Absolute 0 86  0 17 - 1 22 Thousand/µL Final    Eosinophils Absolute 0 05  0 00 - 0 61 Thousand/µL Final    Basophils Absolute 0 01  0 00 - 0 10 Thousands/µL Final   BASIC METABOLIC PANEL - Abnormal    Sodium 139  136 - 145 mmol/L Final    Potassium 3 6  3 5 - 5 3 mmol/L Final    Chloride 110 (*) 100 - 108 mmol/L Final    CO2 24  21 - 32 mmol/L Final    ANION GAP 5  4 - 13 mmol/L Final    BUN 7  5 - 25 mg/dL Final    Creatinine 0 46 (*) 0 60 - 1 30 mg/dL Final    Comment: Standardized to IDMS reference method    Glucose 86  65 - 140 mg/dL Final    Comment: If the patient is fasting, the ADA then defines impaired fasting glucose as > 100 mg/dL and diabetes as > or equal to 123 mg/dL    Specimen collection should occur prior to Sulfasalazine administration due to the potential for falsely depressed results  Specimen collection should occur prior to Sulfapyridine administration due to the potential for falsely elevated results  Calcium 8 2 (*) 8 3 - 10 1 mg/dL Final    eGFR 137  ml/min/1 73sq m Final    Narrative:     [place] guidelines for Chronic Kidney Disease (CKD):     Stage 1 with normal or high GFR (GFR > 90 mL/min/1 73 square meters)    Stage 2 Mild CKD (GFR = 60-89 mL/min/1 73 square meters)    Stage 3A Moderate CKD (GFR = 45-59 mL/min/1 73 square meters)    Stage 3B Moderate CKD (GFR = 30-44 mL/min/1 73 square meters)    Stage 4 Severe CKD (GFR = 15-29 mL/min/1 73 square meters)    Stage 5 End Stage CKD (GFR <15 mL/min/1 73 square meters)  Note: GFR calculation is accurate only with a steady state creatinine     Time reflects when diagnosis was documented in both MDM as applicable and the Disposition within this note       Time User Action Codes Description Comment    1/16/2022 11:41 AM Landy Patella Add [R07 9] Chest pain, unspecified type     1/16/2022 11:42 AM Landy Patella Add [Z34 93] Third trimester pregnancy     1/16/2022 11:42 AM Landy Patella Add [R05 9] Cough     1/16/2022 11:42 AM Landy Patella Add [J02 9] Sore throat           ED Disposition       ED Disposition Condition Date/Time Comment    Discharge Good Sun Jan 16, 2022 12:46 PM Memo Renee discharge to home/self care  Results of completed tests discussed  Return to ER precautions given, verbal and written, and questions answered satisfactorily                      Follow-up Information       Follow up With Specialties Details Why Contact Info    Delmar Hoang MD Family Medicine Call in 1 day To recheck symptoms and follow up on your ER visit [address], [facility] [address] [phone]            Discharge Medication List as of 1/16/2022 12:43 PM        CONTINUE these medications which have NOT CHANGED    Details   aspirin 81 mg chewable tablet Chew 2 tablets (162 mg total) daily, Starting Thu 9/9/2021, Normal      Doxylamine Succinate, Sleep, (UNISOM PO) Take 25 mg by mouth At  Bed time, Historical Med      Prenatal Vit-Fe Fumarate-FA (PRENATAL/FOLIC ACID PO) Take by mouth, Historical Med      pyridoxine (VITAMIN B6) 50 mg tablet Take 50 mg by mouth daily, Historical Med           No discharge procedures on file  Prior to Admission Medications   Prescriptions Last Dose Informant Patient Reported? Taking? Doxylamine Succinate, Sleep, (UNISOM PO)  Self Yes No   Sig: Take 25 mg by mouth At  Bed time   Prenatal Vit-Fe Fumarate-FA (PRENATAL/FOLIC ACID PO)  Self Yes No   Sig: Take by mouth   aspirin 81 mg chewable tablet  Self No No   Sig: Chew 2 tablets (162 mg total) daily   pyridoxine (VITAMIN B6) 50 mg tablet  Self Yes No   Sig: Take 50 mg by mouth daily      Facility-Administered Medications: None       Portions of the record may have been created with voice recognition software  Occasional wrong word or "sound a like" substitutions may have occurred due to the inherent limitations of voice recognition software  Read the chart carefully and recognize, using context, where substitutions have occurred      Electronically signed by:  Franklin Adams

## 2022-01-16 NOTE — ED PROVIDER NOTES
History  Chief Complaint   Patient presents with    Chest Pain     Pt c/o chest pain, cough, fever, and sore throat since yesterday  Pt denies COVID contact  Pt is 28 weeks pregnant     Solomon Quach is an 32y o  year old female, , currently 32 weeks pregnant, with PMHx significant for tobacco use, who presents to the ED today with chest pain for two days and sore throat, vomiting, cough, subjective fever for 1 day  Chest pain is exacerbated by cough and relieved by nothing  Chest pain is not exertional   The pain is central, pressure in quality, does not radiate, and currently rated moderate in severity  Still feeling fetal movement  No vaginal bleeding or discharge  The patient denies lightheadedness or syncope, shortness of breath, abdominal pain, changes in usual bowel movements, changes with urination, pain anywhere else in body  ROS otherwise negative  The patient has recent travel history, new or changing medications, or immunocompromise  Possible sick contacts at work  Has had COVID-19 vaccine x1  History provided by:  Medical records and patient   used: No        Prior to Admission Medications   Prescriptions Last Dose Informant Patient Reported? Taking?    Doxylamine Succinate, Sleep, (UNISOM PO)  Self Yes No   Sig: Take 25 mg by mouth At  Bed time   Prenatal Vit-Fe Fumarate-FA (PRENATAL/FOLIC ACID PO)  Self Yes No   Sig: Take by mouth   aspirin 81 mg chewable tablet  Self No No   Sig: Chew 2 tablets (162 mg total) daily   pyridoxine (VITAMIN B6) 50 mg tablet  Self Yes No   Sig: Take 50 mg by mouth daily      Facility-Administered Medications: None       Past Medical History:   Diagnosis Date    Anal itching     since childhood    Chicken pox     Cyst of fallopian tube 2017    Right side, 11 cm, benign    Generalized headaches     Need for HPV vaccination     completed series 2018    Psoriasis        Past Surgical History:   Procedure Laterality Date    NOSE SURGERY      Nasal Septum deviation repair, orly MARTIN REMOVAL OF OVARIAN CYST(S) Right 3/1/2018    Procedure: LAPAROSCOPIC RIGHT SALPINGOCYSTECTOMY;  Surgeon: Arnell Libman, MD;  Location: [facility];  Service: Gynecology       Family History   Problem Relation Age of Onset    Hyperlipidemia Mother     Stroke Father 36        He was a smoker    Gopi's disease Maternal Grandmother     Breast cancer Neg Hx     Ovarian cancer Neg Hx     Colon cancer Neg Hx     Asthma Neg Hx     Cancer Neg Hx     Deep vein thrombosis Neg Hx     Pulmonary embolism Neg Hx     Venous thrombosis Neg Hx     Diabetes Neg Hx     Heart failure Neg Hx     Hypertension Neg Hx     Lung disease Neg Hx     Migraines Neg Hx     Miscarriages / Stillbirths Neg Hx     Seizures Neg Hx     Thyroid disease Neg Hx     Transient ischemic attack Neg Hx      I have reviewed and agree with the history as documented  E-Cigarette/Vaping    E-Cigarette Use Never User      E-Cigarette/Vaping Substances    Nicotine No     THC No     CBD No     Flavoring No     Other No     Unknown No      Social History     Tobacco Use    Smoking status: Former Smoker    Smokeless tobacco: Current User    Tobacco comment: pt quit smoking Hooka   Vaping Use    Vaping Use: Never used   Substance Use Topics    Alcohol use: Not Currently     Comment: occasional- stopped    Drug use: No        Review of Systems   Reason unable to perform ROS: please see above for ROS  All other ROS negative         Physical Exam  ED Triage Vitals   Temperature Pulse Respirations Blood Pressure SpO2   01/16/22 1112 01/16/22 1113 01/16/22 1113 01/16/22 1113 01/16/22 1113   98 5 °F (36 9 °C) (!) 134 17 131/61 99 %      Temp Source Heart Rate Source Patient Position - Orthostatic VS BP Location FiO2 (%)   01/16/22 1112 01/16/22 1113 01/16/22 1113 01/16/22 1113 --   Oral Monitor Lying Right arm       Pain Score       01/16/22 1113       8             Orthostatic Vital Signs  Vitals:    01/16/22 1113   BP: 131/61   Pulse: (!) 134   Patient Position - Orthostatic VS: Lying       Physical Exam  Vitals and nursing note reviewed  Constitutional:       General: She is not in acute distress  Appearance: She is well-developed  She is not diaphoretic  HENT:      Head: Normocephalic and atraumatic  Eyes:      General: No scleral icterus  Conjunctiva/sclera: Conjunctivae normal    Neck:      Vascular: No JVD  Trachea: No tracheal deviation  Cardiovascular:      Rate and Rhythm: Regular rhythm  Tachycardia present  Pulmonary:      Effort: Pulmonary effort is normal  No respiratory distress  Breath sounds: Normal breath sounds  Abdominal:      General: There is no distension  Palpations: Abdomen is soft  Comments: gravid   Musculoskeletal:         General: No deformity  Cervical back: Neck supple  Skin:     General: Skin is warm and dry  Neurological:      Mental Status: She is alert     Psychiatric:         Behavior: Behavior normal          ED Medications  Medications   sodium chloride 0 9 % bolus 1,000 mL (1,000 mL Intravenous New Bag 1/16/22 1155)       Diagnostic Studies  Results Reviewed     Procedure Component Value Units Date/Time    Basic metabolic panel [[id number]]  (Abnormal) Collected: 01/16/22 1155    Lab Status: Final result Specimen: Blood from Arm, Left Updated: 01/16/22 1231     Sodium 139 mmol/L      Potassium 3 6 mmol/L      Chloride 110 mmol/L      CO2 24 mmol/L      ANION GAP 5 mmol/L      BUN 7 mg/dL      Creatinine 0 46 mg/dL      Glucose 86 mg/dL      Calcium 8 2 mg/dL      eGFR 137 ml/min/1 73sq m     Narrative:      [place] guidelines for Chronic Kidney Disease (CKD):     Stage 1 with normal or high GFR (GFR > 90 mL/min/1 73 square meters)    Stage 2 Mild CKD (GFR = 60-89 mL/min/1 73 square meters)    Stage 3A Moderate CKD (GFR = 45-59 mL/min/1 73 square meters)    Stage 3B Moderate CKD (GFR = 30-44 mL/min/1 73 square meters)    Stage 4 Severe CKD (GFR = 15-29 mL/min/1 73 square meters)    Stage 5 End Stage CKD (GFR <15 mL/min/1 73 square meters)  Note: GFR calculation is accurate only with a steady state creatinine    CBC and differential [[id number]]  (Abnormal) Collected: 01/16/22 1155    Lab Status: Final result Specimen: Blood from Arm, Left Updated: 01/16/22 1214     WBC 8 44 Thousand/uL      RBC 4 18 Million/uL      Hemoglobin 10 6 g/dL      Hematocrit 33 1 %      MCV 79 fL      MCH 25 4 pg      MCHC 32 0 g/dL      RDW 15 0 %      MPV 10 8 fL      Platelets 655 Thousands/uL      nRBC 0 /100 WBCs      Neutrophils Relative 82 %      Immat GRANS % 1 %      Lymphocytes Relative 6 %      Monocytes Relative 10 %      Eosinophils Relative 1 %      Basophils Relative 0 %      Neutrophils Absolute 6 93 Thousands/µL      Immature Grans Absolute 0 05 Thousand/uL      Lymphocytes Absolute 0 54 Thousands/µL      Monocytes Absolute 0 86 Thousand/µL      Eosinophils Absolute 0 05 Thousand/µL      Basophils Absolute 0 01 Thousands/µL     COVID/FLU/RSV - 2 hour TAT [[id number]] Collected: 01/16/22 1155    Lab Status: In process Specimen: Nares from Nose Updated: 01/16/22 1201                 No orders to display         Procedures  Procedures      ED Course  ED Course as of 01/16/22 1257   Sun Jan 16, 2022   1140 Procedure Note: EKG  Date/Time: 01/16/22 11:40 AM   Interpreted by: Ralf Mitchell DO  Indications / Diagnosis: CP  ECG reviewed by me, the ED Provider: yes   The EKG demonstrates:  Rhythm: sinus, rate 122  Intervals: normal intervals  Axis: normal axis  QRS: normal QRS  ST Changes: No acute ST Changes, no STD/HI                                            MDM  Number of Diagnoses or Management Options  Diagnosis management comments: The patient denies association with alcohol use, vomiting, eating, position, or pain radiating to the neck, arms, abdomen, or back    Breath sounds are present bilaterally and the patient is not in respiratory distress  No dyspnea, nausea, syncope/presyncope, hypoxia, unilateral leg swelling or tenderness, history of blood clots  The patient has no history of heart or lung disease  VS wnl  Amount and/or Complexity of Data Reviewed  Clinical lab tests: ordered and reviewed  Review and summarize past medical records: yes    Risk of Complications, Morbidity, and/or Mortality  Diagnostic procedures: low    Patient Progress  Patient progress: stable      Disposition  Final diagnoses:   Chest pain, unspecified type   Third trimester pregnancy   Cough   Sore throat     Time reflects when diagnosis was documented in both MDM as applicable and the Disposition within this note     Time User Action Codes Description Comment    1/16/2022 11:41 AM Wan Chiara Add [R07 9] Chest pain, unspecified type     1/16/2022 11:42 AM Simon Has [Z34 93] Third trimester pregnancy     1/16/2022 11:42 AM Wan Chiara Add [R05 9] Cough     1/16/2022 11:42 AM Wan Chiara Add [J02 9] Sore throat       ED Disposition     ED Disposition Condition Date/Time Comment    Discharge Good Sun Jan 16, 2022 12:46 PM Veronika Yi discharge to home/self care  Results of completed tests discussed  Return to ER precautions given, verbal and written, and questions answered satisfactorily  Follow-up Information     Follow up With Specialties Details Why Contact Info    Chandrakant Aguirre MD Family Medicine Call in 1 day To recheck symptoms and follow up on your ER visit [facility]  [address] [place] [address], [facility] [phone]            Patient's Medications   Discharge Prescriptions    No medications on file     No discharge procedures on file  PDMP Review     None           ED Provider  Attending physically available and evaluated [place] Kassidy  I managed the patient along with the ED Attending      Electronically Signed by         Martha Triplett DO  01/16/22 [address] [facility],[address]

## 2022-01-16 NOTE — Clinical Note
Rich Godwin was seen and treated in our emergency department on 1/16/2022  Diagnosis:     Tran    She may return on this date: 01/19/2022    If COVID-19 test returns positive, please excuse from work for 10 days from symptom onset (through 1/25/22) in accordance with CDC guidelines  If you have any questions or concerns, please don't hesitate to call        Mehran Butler DO    ______________________________           _______________          _______________  Hospital Representative                              Date                                Time

## 2022-01-16 NOTE — ED PROCEDURE NOTE
Procedure  POC Pelvic US    Date/Time: 1/16/2022 11:43 AM  Performed by: Dominik Andrews DO  Authorized by: Dominik Andrews DO     Patient location:  ED  Other Assisting Provider: No    Procedure details:     Exam Type:  Diagnostic    Indications comment:  Pregnant with tachycardia, CP, eval for FHR    Assessment for: evaluate fetal viability      Technique:  Transabdominal obstetric (HCG+) exam    Views obtained comment:  Uterus    Image quality: diagnostic      Image availability:  Images available in PACS  Uterine findings:     Fetal heart rate: identified      Fetal heart rate (bpm):  142  Interpretation:     Ultrasound impressions: normal      Pregnancy findings: intrauterine pregnancy (IUP)                       Doimnik Andrews DO  01/16/22 1144

## 2022-01-16 NOTE — Clinical Note
Bertha Sheldon discharge to home/self care  Results of completed tests discussed  Return to ER precautions given, verbal and written, and questions answered satisfactorily

## 2022-01-16 NOTE — DISCHARGE INSTRUCTIONS
You were seen today in the Emergency Department  Please follow up with your Primary Care Provider and OBGYN  in the next 1-2 days to recheck your symptoms and to follow up on your visit to the Emergency Department today  Please return to the Emergency Department if you have fevers, chills, chest pain, shortness of breath, are unable to eat or drink, or have any other symptoms that concern you  Please look this over and let your nurse and/or me know if you have any further questions before you leave

## 2022-01-17 LAB
ATRIAL RATE: 122 BPM
P AXIS: 44 DEGREES
PR INTERVAL: 144 MS
QRS AXIS: 46 DEGREES
QRSD INTERVAL: 76 MS
QT INTERVAL: 320 MS
QTC INTERVAL: 456 MS
T WAVE AXIS: 26 DEGREES
VENTRICULAR RATE: 122 BPM

## 2022-01-17 PROCEDURE — 93010 ELECTROCARDIOGRAM REPORT: CPT | Performed by: INTERNAL MEDICINE

## 2022-01-18 ENCOUNTER — APPOINTMENT (EMERGENCY)
Dept: RADIOLOGY | Facility: HOSPITAL | Age: 27
End: 2022-01-18
Payer: COMMERCIAL

## 2022-01-18 ENCOUNTER — HOSPITAL ENCOUNTER (EMERGENCY)
Facility: HOSPITAL | Age: 27
Discharge: HOME | End: 2022-01-18
Attending: EMERGENCY MEDICINE
Payer: COMMERCIAL

## 2022-01-18 ENCOUNTER — TELEMEDICINE (OUTPATIENT)
Dept: FAMILY MEDICINE CLINIC | Facility: CLINIC | Age: 27
End: 2022-01-18
Payer: COMMERCIAL

## 2022-01-18 VITALS
TEMPERATURE: 98.2 F | SYSTOLIC BLOOD PRESSURE: 116 MMHG | HEIGHT: 67 IN | OXYGEN SATURATION: 97 % | BODY MASS INDEX: 33.74 KG/M2 | RESPIRATION RATE: 14 BRPM | WEIGHT: 215 LBS | DIASTOLIC BLOOD PRESSURE: 58 MMHG | HEART RATE: 94 BPM

## 2022-01-18 VITALS — TEMPERATURE: 98.7 F

## 2022-01-18 DIAGNOSIS — R06.02 SOB (SHORTNESS OF BREATH): Primary | ICD-10-CM

## 2022-01-18 DIAGNOSIS — Z3A.32 32 WEEKS GESTATION OF PREGNANCY: ICD-10-CM

## 2022-01-18 DIAGNOSIS — R94.31 ABNORMAL EKG: ICD-10-CM

## 2022-01-18 DIAGNOSIS — O99.013 ANEMIA DURING PREGNANCY IN THIRD TRIMESTER: ICD-10-CM

## 2022-01-18 DIAGNOSIS — U07.1 COVID-19: ICD-10-CM

## 2022-01-18 DIAGNOSIS — R79.9 ABNORMAL BLOOD CHEMISTRY: ICD-10-CM

## 2022-01-18 DIAGNOSIS — U07.1 COVID-19: Primary | ICD-10-CM

## 2022-01-18 LAB
ATRIAL RATE: 92 BPM
BILIRUB UR QL STRIP: NEGATIVE
CLARITY UR: CLEAR
COLOR UR: YELLOW
GLUCOSE UR STRIP-MCNC: NEGATIVE MG/DL
HGB UR QL STRIP.AUTO: NEGATIVE
KETONES UR STRIP-MCNC: NEGATIVE MG/DL
LEUKOCYTE ESTERASE UR QL STRIP: NEGATIVE
NITRITE UR QL STRIP: NEGATIVE
P AXIS: 53 DEGREES
PH UR STRIP.AUTO: 6 [PH]
PR INTERVAL: 150 MS
PROT UR STRIP-MCNC: NEGATIVE MG/DL
QRS AXIS: 54 DEGREES
QRSD INTERVAL: 86 MS
QT INTERVAL: 362 MS
QTC INTERVAL: 447 MS
SP GR UR STRIP.AUTO: >=1.03 (ref 1–1.03)
T WAVE AXIS: 32 DEGREES
UROBILINOGEN UR QL STRIP.AUTO: 1 E.U./DL
VENTRICULAR RATE: 92 BPM

## 2022-01-18 PROCEDURE — 96360 HYDRATION IV INFUSION INIT: CPT

## 2022-01-18 PROCEDURE — 93005 ELECTROCARDIOGRAM TRACING: CPT

## 2022-01-18 PROCEDURE — 99285 EMERGENCY DEPT VISIT HI MDM: CPT | Performed by: EMERGENCY MEDICINE

## 2022-01-18 PROCEDURE — 81003 URINALYSIS AUTO W/O SCOPE: CPT | Performed by: EMERGENCY MEDICINE

## 2022-01-18 PROCEDURE — 87086 URINE CULTURE/COLONY COUNT: CPT | Performed by: EMERGENCY MEDICINE

## 2022-01-18 PROCEDURE — 76815 OB US LIMITED FETUS(S): CPT | Performed by: EMERGENCY MEDICINE

## 2022-01-18 PROCEDURE — 99284 EMERGENCY DEPT VISIT MOD MDM: CPT

## 2022-01-18 PROCEDURE — 71045 X-RAY EXAM CHEST 1 VIEW: CPT

## 2022-01-18 PROCEDURE — 99214 OFFICE O/P EST MOD 30 MIN: CPT | Performed by: FAMILY MEDICINE

## 2022-01-18 PROCEDURE — 93010 ELECTROCARDIOGRAM REPORT: CPT | Performed by: INTERNAL MEDICINE

## 2022-01-18 RX ORDER — SENNOSIDES 8.6 MG
650 CAPSULE ORAL EVERY 8 HOURS PRN
COMMUNITY
End: 2022-02-01

## 2022-01-18 RX ORDER — FLUTICASONE PROPIONATE 50 MCG
1 SPRAY, SUSPENSION (ML) NASAL DAILY
Qty: 16 G | Refills: 0 | Status: SHIPPED | OUTPATIENT
Start: 2022-01-18

## 2022-01-18 RX ADMIN — SODIUM CHLORIDE 1000 ML: 0.9 INJECTION, SOLUTION INTRAVENOUS at 18:20

## 2022-01-18 NOTE — ED PROVIDER NOTES
History  Chief Complaint   Patient presents with    Medical Problem     Patient reports that she is about 32 weeks pregnant and positive for COVID two days ago; states that she has been having flu like symptoms such as tiredness and congestion and called her OB/GYN and was told to come into ED     63-year-old female with no significant past medical history who is a  32 week pregnant patient COVID on  presents with shortness of breath  Patient was diagnosed with COVID on  here  Patient had a virtual visit with her PCP today and was told to come to the ED for evaluation  Patient reports that she has cough, congestion, fatigue, myalgias, and decreased appetite  She has been taking Tylenol for her symptoms and it helps for a little while  She got worried because she was awoken at 3 in the morning with shortness of breath  It resolved after few minutes  She also reports 2nd episode of shortness of breath while she was outside exerting herself this morning  She does not feel short of breath at rest   Patient notes some chest pain only when she is coughing  She states that she has had some mild leg swelling in both of her legs for the past month  No recent significant changes  Patient received 2 COVID vaccinations  Patient also notes that for the past couple of days she has had some mild left lower quadrant pain  She still feels the baby moving  No vaginal bleeding or discharge  No fevers, chills, nausea, vomiting, or diarrhea  Prior to Admission Medications   Prescriptions Last Dose Informant Patient Reported? Taking?    Doxylamine Succinate, Sleep, (UNISOM PO)  Self Yes No   Sig: Take 25 mg by mouth At  Bed time   Patient not taking: Reported on 2022    Prenatal Vit-Fe Fumarate-FA (PRENATAL/FOLIC ACID PO)  Self Yes No   Sig: Take by mouth   acetaminophen (TYLENOL) 650 mg CR tablet   Yes No   Sig: Take 650 mg by mouth every 8 (eight) hours as needed for mild pain   aspirin 81 mg chewable tablet  Self No No   Sig: Chew 2 tablets (162 mg total) daily   pyridoxine (VITAMIN B6) 50 mg tablet  Self Yes No   Sig: Take 50 mg by mouth daily   Patient not taking: Reported on 1/18/2022       Facility-Administered Medications: None       Past Medical History:   Diagnosis Date    Anal itching     since childhood    Chicken pox     Cyst of fallopian tube 2017    Right side, 11 cm, benign    Generalized headaches     Need for HPV vaccination     completed series 2018    Psoriasis        Past Surgical History:   Procedure Laterality Date    NOSE SURGERY      Nasal Septum deviation repair, lazer    DE REMOVAL OF OVARIAN CYST(S) Right 3/1/2018    Procedure: LAPAROSCOPIC RIGHT SALPINGOCYSTECTOMY;  Surgeon: Juve Murray MD;  Location: [facility];  Service: Gynecology       Family History   Problem Relation Age of Onset    Hyperlipidemia Mother     Stroke Father 36        He was a smoker    [place]'s disease Maternal Grandmother     Breast cancer Neg Hx     Ovarian cancer Neg Hx     Colon cancer Neg Hx     Asthma Neg Hx     Cancer Neg Hx     Deep vein thrombosis Neg Hx     Pulmonary embolism Neg Hx     Venous thrombosis Neg Hx     Diabetes Neg Hx     Heart failure Neg Hx     Hypertension Neg Hx     Lung disease Neg Hx     Migraines Neg Hx     Miscarriages / Stillbirths Neg Hx     Seizures Neg Hx     Thyroid disease Neg Hx     Transient ischemic attack Neg Hx      I have reviewed and agree with the history as documented  E-Cigarette/Vaping    E-Cigarette Use Never User      E-Cigarette/Vaping Substances    Nicotine No     THC No     CBD No     Flavoring No     Other No     Unknown No      Social History     Tobacco Use    Smoking status: Former Smoker    Smokeless tobacco: Current User    Tobacco comment: pt quit smoking Hooka   Vaping Use    Vaping Use: Never used   Substance Use Topics    Alcohol use: Not Currently     Comment: occasional- stopped    Drug use:  No Review of Systems   Constitutional: Positive for appetite change and fatigue  Negative for chills and fever  HENT: Positive for congestion and rhinorrhea  Eyes: Negative for pain and redness  Respiratory: Positive for shortness of breath  Negative for cough, chest tightness and wheezing  Cardiovascular: Positive for chest pain and leg swelling  Negative for palpitations  Gastrointestinal: Positive for abdominal pain  Negative for diarrhea, nausea and vomiting  Endocrine: Negative  Genitourinary: Negative for difficulty urinating and hematuria  Musculoskeletal: Negative for back pain and myalgias  Skin: Negative for pallor and rash  Allergic/Immunologic: Negative  Neurological: Positive for weakness  Negative for dizziness, light-headedness and headaches  Hematological: Negative  Physical Exam  ED Triage Vitals [01/18/22 1238]   Temperature Pulse Respirations Blood Pressure SpO2   98 2 °F (36 8 °C) 104 18 117/75 97 %      Temp Source Heart Rate Source Patient Position - Orthostatic VS BP Location FiO2 (%)   Oral Monitor Sitting Left arm --      Pain Score       No Pain             Orthostatic Vital Signs  Vitals:    01/18/22 1745 01/18/22 1800 01/18/22 1815 01/18/22 1830   BP: 114/57 118/82  116/58   Pulse: 96 100 94 94   Patient Position - Orthostatic VS:  Sitting         Physical Exam  Vitals and nursing note reviewed  Constitutional:       General: She is not in acute distress  Appearance: Normal appearance  HENT:      Head: Normocephalic and atraumatic  Eyes:      Conjunctiva/sclera: Conjunctivae normal    Cardiovascular:      Rate and Rhythm: Regular rhythm  Tachycardia present  Heart sounds: No murmur heard  Pulmonary:      Effort: Pulmonary effort is normal  No respiratory distress  Breath sounds: Normal breath sounds  No wheezing, rhonchi or rales  Abdominal:      General: Abdomen is flat  Palpations: Abdomen is soft        Tenderness: There is abdominal tenderness (very minimal tenderness) in the left lower quadrant  There is no guarding or rebound  Comments: Gravid    Musculoskeletal:         General: Normal range of motion  Cervical back: Normal range of motion and neck supple  Right lower leg: Edema (mild pitting) present  Left lower leg: Edema (mild pitting) present  Skin:     General: Skin is warm and dry  Neurological:      General: No focal deficit present  Mental Status: She is alert and oriented to person, place, and time  ED Medications  Medications   sodium chloride 0 9 % bolus 1,000 mL (0 mL Intravenous Stopped 1/18/22 3275)       Diagnostic Studies  Results Reviewed     Procedure Component Value Units Date/Time    UA w Reflex to Microscopic w Reflex to Culture [[id number]] Collected: 01/18/22 1734    Lab Status: Final result Specimen: Urine, Clean Catch Updated: 01/18/22 1745     Color, UA Yellow     Clarity, UA Clear     Specific Gravity, UA >=1 030     pH, UA 6 0     Leukocytes, UA Negative     Nitrite, UA Negative     Protein, UA Negative mg/dl      Glucose, UA Negative mg/dl      Ketones, UA Negative mg/dl      Urobilinogen, UA 1 0 E U /dl      Bilirubin, UA Negative     Blood, UA Negative     URINE COMMENT --    Urine culture [[id number]] Collected: 01/18/22 1734    Lab Status: In process Specimen: Urine, Clean Catch Updated: 01/18/22 1745                 XR chest 1 view portable   ED Interpretation by Madeline Kelly DO (01/18 1820)   No acute cardiopulmonary process      Final Result by Carole Mclain MD (01/19 0630)      No acute cardiopulmonary disease  Workstation performed: [id number]               Procedures  Procedures      ED Course  ED Course as of 01/19/22 0924   Tue Jan 18, 2022   1735 Fetal heart rate 139   1814 Spoke to OB  They recommend a nonstress test   Will try to do this here and have them read it     1833 ECG 12 lead  The heart rate is 92, which is normal  The rhythm is regular  The axis is normal  The P waves are normal and the OH interval is normal  The QRS height is normal and width is normal  The ST segments are not elevated or depressed  The T waves are normal  The QT segment is normal  This ecg shows sinus rhythm  1847 NST done for 20 minutes  It was sent to Dr Viky Chen at [facility]  She states that it is reactive and reassuring  SBIRT 20yo+      Most Recent Value   SBIRT (24 yo +)    In order to provide better care to our patients, we are screening all of our patients for alcohol and drug use  Would it be okay to ask you these screening questions? No Filed at: 01/18/2022 1902                MDM  Number of Diagnoses or Management Options  32 weeks gestation of pregnancy: established and improving  COVID-19: established and improving  Diagnosis management comments: 40-year-old female who is 32 weeks pregnant and COVID positive presents with shortness of breath and abdominal pain  Fetal heart rate is 139  Patient remained satting at 98% while ambulating  She is not short of breath at this time  Abdomen is very minimally tender  Will check an EKG and chest x-ray  Will give IV fluids since patient has not been eating and drinking as she normally does  Will reach out to obstetrics for further recommendations  Amount and/or Complexity of Data Reviewed  Tests in the radiology section of CPT®: ordered and reviewed  Review and summarize past medical records: yes  Discuss the patient with other providers: yes    Risk of Complications, Morbidity, and/or Mortality  Presenting problems: low  Diagnostic procedures: low  Management options: low    Patient Progress  Patient progress: improved    Patient's heart rate improved after IV fluids  She felt better  Nonstress test was reassuring per obstetrics  Patient was given nasal spray for congestion and rhinorrhea    Recommend follow-up with primary care physician  Disposition  Final diagnoses:   COVID-19   32 weeks gestation of pregnancy     Time reflects when diagnosis was documented in both MDM as applicable and the Disposition within this note     Time User Action Codes Description Comment    1/18/2022  6:38 PM Jennifer Moon Add [U07 1] COVID-19     1/18/2022  6:38 PM Marbin George Add [Z3A 32] 32 weeks gestation of pregnancy       ED Disposition     ED Disposition Condition Date/Time Comment    Discharge Good Tue Jan 18, 2022  6:50 PM Dania Hinds discharge to home/self care  Follow-up Information     Follow up With Specialties Details Why Contact Info    Devika Iglesias MD [address]  [phone]            Discharge Medication List as of 1/18/2022  6:50 PM      START taking these medications    Details   fluticasone (FLONASE) 50 mcg/act nasal spray 1 spray into each nostril daily, Starting Tue 1/18/2022, Normal      sodium chloride (OCEAN) 0 65 % nasal spray 1 spray into each nostril as needed for congestion or rhinitis, Starting Tue 1/18/2022, Normal         CONTINUE these medications which have NOT CHANGED    Details   acetaminophen (TYLENOL) 650 mg CR tablet Take 650 mg by mouth every 8 (eight) hours as needed for mild pain, Historical Med      aspirin 81 mg chewable tablet Chew 2 tablets (162 mg total) daily, Starting Thu 9/9/2021, Normal      Doxylamine Succinate, Sleep, (UNISOM PO) Take 25 mg by mouth At  Bed time, Historical Med      Prenatal Vit-Fe Fumarate-FA (PRENATAL/FOLIC ACID PO) Take by mouth, Historical Med      pyridoxine (VITAMIN B6) 50 mg tablet Take 50 mg by mouth daily, Historical Med           No discharge procedures on file  PDMP Review     None           ED Provider  Attending physically available and evaluated Dania Hinds  I managed the patient along with the ED Attending      Electronically Signed by         Jorge Abraham DO  01/19/22 5561

## 2022-01-18 NOTE — ED ATTENDING ATTESTATION
1/18/2022  I, Thurnell Runner, MD, saw and evaluated the patient  I have discussed the patient with the resident/non-physician practitioner and agree with the resident's/non-physician practitioner's findings, Plan of Care, and MDM as documented in the resident's/non-physician practitioner's note, except where noted  All available labs and Radiology studies were reviewed  I was present for key portions of any procedure(s) performed by the resident/non-physician practitioner and I was immediately available to provide assistance  At this point I agree with the current assessment done in the Emergency Department  I have conducted an independent evaluation of this patient a history and physical is as follows:  Pt is 32 weeks pregnant Pt awoke at 3 am with sob which resolved and tehn recurred with exertion today Pt is + covid Pt is feeling babay move but ahs some llq abd pain Pt has bilaterl leg swelling for several months  Pt feels like itiis heard to breathe when she talks a lot  Pt has persistent cough and congestion form covid  Pt has a lot of nasal congestion that is worse in the cold and post nasal drip She is taking nothing for and pt states baby lies to l and has some discomfort in lower L abd PE: alert nad heart reg lungs clear abd gravid nontender ext bilat lower edmea trace MDM: Pt was unable to go to [facility] appointment today because she is covid + and was told she could not go   Will check urine for protein ambulatory pulse ox and ekg  ED Course         Critical Care Time  Procedures

## 2022-01-18 NOTE — PROGRESS NOTES
COVID-19 Outpatient Progress Note    Assessment/Plan:    Problem List Items Addressed This Visit     None      Visit Diagnoses     SOB (shortness of breath)    -  Primary    Relevant Orders    Transfer to other facility (Completed)    COVID-19        Relevant Orders    Transfer to other facility (Completed)    32 weeks gestation of pregnancy        Relevant Orders    Transfer to other facility (Completed)    Abnormal EKG        To consider echocardiogram    Anemia during pregnancy in third trimester        Abnormal blood chemistry        Slightly elevated chloride and decreased calcium         Disposition:     Refer patient to the ER  Patient to follow-up post ER  Follow with Ob   I have spent 18 minutes directly with the patient  Encounter provider Altagracia Crystal MD    Provider located at [address] [place] [address]  [address] [facility] [address] [phone]    Recent Visits  No visits were found meeting these conditions  Showing recent visits within past 7 days and meeting all other requirements  Today's Visits  Date Type Provider Dept   01/18/22 Telemedicine Altagracia Crystal MD [facility]   Showing today's visits and meeting all other requirements  Future Appointments  No visits were found meeting these conditions  Showing future appointments within next 150 days and meeting all other requirements     This virtual check-in was done via [employer] and patient was informed that this is a secure, HIPAA-compliant platform  She agrees to proceed  Patient agrees to participate in a virtual check in via telephone or video visit instead of presenting to the office to address urgent/immediate medical needs  Patient is aware this is a billable service  After connecting through [facility], the patient was identified by name and date of birth   Yesika Kaufman was informed that this was a telemedicine visit and that the exam was being conducted confidentially over secure lines  My office door was closed  No one else was in the room  Solomon Quach acknowledged consent and understanding of privacy and security of the telemedicine visit  I informed the patient that I have reviewed her record in Epic and presented the opportunity for her to ask any questions regarding the visit today  The patient agreed to participate  Verification of patient location:  Patient is located in the following state in which I hold an active license: [place]    Subjective:   Solomon Quach is a 32 y o  female who has been screened for COVID-19  Patient's symptoms include fatigue, malaise, nasal congestion, cough and shortness of breath  Patient denies fever, chills, rhinorrhea, sore throat, anosmia, loss of taste, chest tightness, abdominal pain, nausea, vomiting, diarrhea, myalgias and headaches  - Date of symptom onset: 1/15/2022  - Date of positive COVID-19 test: 1/16/2022  Type of test: PCR  COVID-19 vaccination status: Fully vaccinated (primary series)    Jeff Murphy has been staying home and has isolated themselves in her home  She is taking care to not share personal items and is cleaning all surfaces that are touched often, like counters, tabletops, and doorknobs using household cleaning sprays or wipes  She is wearing a mask when she leaves her room  Patient stated Saturday last week start feeling she developed nasal congestion, chest pain, dry cough and nasal congestion  Went to the emergency room on January 16, 2021  She had an appointment with Ob today was canceled  She woke up this morning with shortness of breath, mild to moderate  , now her shortness of breath is better  Continue to have nasal congestion some ,dry cough and feeling very tired  Patient is 32 weeks pregnant  Patient stated the fetus is moving normally      ER  visit on January 16, 2022  noted  EKG and labs reviewed and discussed with patient    Lab Results   Component Value Date    SARSCOV2 Positive (A) 01/16/2022     Past Medical History:   Diagnosis Date    Anal itching     since childhood    Chicken pox     Cyst of fallopian tube 2017    Right side, 11 cm, benign    Generalized headaches     Need for HPV vaccination     completed series 2018    Psoriasis      Past Surgical History:   Procedure Laterality Date    NOSE SURGERY      Nasal Septum deviation repair, lazer    [place] REMOVAL OF OVARIAN CYST(S) Right 3/1/2018    Procedure: LAPAROSCOPIC RIGHT SALPINGOCYSTECTOMY;  Surgeon: Justino Landon MD;  Location: [facility];  Service: Gynecology     Current Outpatient Medications   Medication Sig Dispense Refill    acetaminophen (TYLENOL) 650 mg CR tablet Take 650 mg by mouth every 8 (eight) hours as needed for mild pain      aspirin 81 mg chewable tablet Chew 2 tablets (162 mg total) daily 60 tablet 6    Prenatal Vit-Fe Fumarate-FA (PRENATAL/FOLIC ACID PO) Take by mouth      Doxylamine Succinate, Sleep, (UNISOM PO) Take 25 mg by mouth At  Bed time (Patient not taking: Reported on 1/18/2022 )      pyridoxine (VITAMIN B6) 50 mg tablet Take 50 mg by mouth daily (Patient not taking: Reported on 1/18/2022 )       No current facility-administered medications for this visit  No Known Allergies    Review of Systems   Constitutional: Positive for fatigue  Negative for activity change, appetite change, chills, fever and unexpected weight change  HENT: Positive for congestion  Negative for ear discharge, ear pain, hearing loss, nosebleeds, rhinorrhea, sinus pressure, sore throat, tinnitus, trouble swallowing and voice change  Eyes: Negative for photophobia, pain and visual disturbance  Respiratory: Positive for cough and shortness of breath  Negative for chest tightness and wheezing  Cardiovascular: Negative for chest pain, palpitations and leg swelling  Gastrointestinal: Negative for abdominal pain, anal bleeding, blood in stool, constipation, diarrhea, nausea and vomiting     Endocrine: Negative for cold intolerance, heat intolerance, polydipsia and polyuria  Genitourinary: Negative for dysuria, frequency, hematuria and urgency  Musculoskeletal: Negative for arthralgias, back pain, gait problem, joint swelling, myalgias and neck pain  Skin: Negative for rash  Neurological: Negative for dizziness, tremors, seizures, syncope, weakness, light-headedness and headaches  Hematological: Negative for adenopathy  Does not bruise/bleed easily  Psychiatric/Behavioral: Negative for agitation, behavioral problems, confusion, dysphoric mood, hallucinations and sleep disturbance  The patient is not nervous/anxious  Objective:    Vitals:    01/18/22 1039   Temp: 98 7 °F (37 1 °C)   TempSrc: Temporal       Physical Exam  Constitutional:       General: She is not in acute distress  Appearance: She is well-developed  She is ill-appearing  She is not diaphoretic  HENT:      Head: Normocephalic and atraumatic  Eyes:      General: No scleral icterus  Neck:      Vascular: No JVD  Pulmonary:      Effort: Pulmonary effort is normal    Musculoskeletal:         General: No deformity  Cervical back: Normal range of motion and neck supple  Skin:     Coloration: Skin is pale  Findings: No rash  Neurological:      Mental Status: She is alert and oriented to person, place, and time  Cranial Nerves: No cranial nerve deficit  Psychiatric:         Behavior: Behavior normal          Thought Content: Thought content normal          Judgment: Judgment normal          VIRTUAL VISIT DISCLAIMER    Zackary Wilkins verbally agrees to participate in [employer]  Pt is aware that [employer] could be limited without vital signs or the ability to perform a full hands-on physical Katarina Martinez understands she or the provider may request at any time to terminate the video visit and request the patient to seek care or treatment in person

## 2022-01-18 NOTE — ED NOTES
Fetal monitoring initiated per dr Lexus Rivera  FHT 130BMP, RUQ, no contractions noted at this time       Jonatan Childs, ROSITA  01/18/22 2793

## 2022-01-18 NOTE — ED PROCEDURE NOTE
Procedure  POC Pelvic US    Date/Time: 1/18/2022 5:59 PM  Performed by: Tomer Diop MD  Authorized by: Tomer Diop MD     Patient location:  ED  Other Assisting Provider: No    Procedure details:     Exam Type:  Diagnostic    Indications: evaluate for IUP      Assessment for: evaluate fetal viability      Technique:  Transabdominal obstetric (HCG+) exam    Views obtained: uterus (transverse and sagittal)      Image quality: diagnostic      Image availability:  Images available in PACS  Uterine findings:     Fetal heart rate: identified (143)      Fetal heart rate (bpm):  143  Interpretation:     Ultrasound impressions: normal      Pregnancy findings: intrauterine pregnancy (IUP)      Estimated gestational age (weeks):  88 Meme Vargas MD  01/18/22 1803

## 2022-01-18 NOTE — DISCHARGE INSTRUCTIONS
You have been seen for Covid  You should return to the ED if you develop trouble breathing at rest, dehydration, abdominal pain or bleeding, or other worsening symptoms  Follow up with your PCP  Use nasal spray at the pharmacy for your symptoms

## 2022-01-19 ENCOUNTER — TELEPHONE (OUTPATIENT)
Dept: PERINATAL CARE | Facility: CLINIC | Age: 27
End: 2022-01-19

## 2022-01-19 LAB — BACTERIA UR CULT: NORMAL

## 2022-01-19 NOTE — TELEPHONE ENCOUNTER
Pt had called asking if she would be able to keep her ultrasound appt on 1/20 due to testing positive for covid on 1/16,pt's symptoms started on 1/14 she is still having symptoms no fever but has congestion so I did tell her that it is best for her to rescheldule her appt has been reschelduled for 2/1

## 2022-01-30 NOTE — PATIENT INSTRUCTIONS
Thank you for choosing us for your  care today  If you have any questions about your ultrasound or care, please do not hesitate to contact us or your primary obstetrician  Some general instructions for your pregnancy are:     Protect against coronavirus: get vaccinated and mask up  Pregnant women are increased risk of severe COVID  Notify your primary care doctor if you have any symptoms including cough, shortness of breath or difficulty breathing, fever, chills, muscle pain, sore throat, or loss of taste or smell   Exercise: Aim for 22 minutes per day (150 minutes per week) of regular exercise  Walking is great!  Nutrition: aim for calcium-rich and iron-rich foods as well as healthy sources of protein   Learn about Preeclampsia: preeclampsia is a common, serious high blood pressure complication in pregnancy  A blood pressure of 844CRCC (systolic or top number) or 64CPLN (diastolic or bottom number) is not normal and needs evaluation by your doctor  Aspirin is sometimes prescribed in early pregnancy to prevent preeclampsia in women with risk factors - ask your obstetrician if you should be on this medication   If you smoke, try to reduce how many cigarettes you smoke or try to quit completely  Do not vape   Other warning signs to watch out for in pregnancy or postpartum: chest pain, obstructed breathing or shortness of breath, seizures, thoughts of hurting yourself or your baby, bleeding, a painful or swollen leg, fever, or headache (see [facility] POST-BIRTH Warning Signs campaign)  If these happen call 911  Itching is also not normal in pregnancy and if you experience this, especially over your hands and feet, potentially worse at night, notify your doctors

## 2022-02-01 ENCOUNTER — ROUTINE PRENATAL (OUTPATIENT)
Dept: OBGYN CLINIC | Facility: CLINIC | Age: 27
End: 2022-02-01
Payer: COMMERCIAL

## 2022-02-01 ENCOUNTER — ULTRASOUND (OUTPATIENT)
Dept: PERINATAL CARE | Facility: CLINIC | Age: 27
End: 2022-02-01
Payer: COMMERCIAL

## 2022-02-01 VITALS
HEART RATE: 86 BPM | BODY MASS INDEX: 33.37 KG/M2 | DIASTOLIC BLOOD PRESSURE: 66 MMHG | WEIGHT: 212.6 LBS | HEIGHT: 67 IN | SYSTOLIC BLOOD PRESSURE: 126 MMHG

## 2022-02-01 VITALS
WEIGHT: 213.8 LBS | DIASTOLIC BLOOD PRESSURE: 76 MMHG | SYSTOLIC BLOOD PRESSURE: 114 MMHG | HEIGHT: 67 IN | BODY MASS INDEX: 33.56 KG/M2

## 2022-02-01 DIAGNOSIS — O99.213 OBESITY AFFECTING PREGNANCY IN THIRD TRIMESTER: ICD-10-CM

## 2022-02-01 DIAGNOSIS — O99.213 OBESITY AFFECTING PREGNANCY IN THIRD TRIMESTER: Primary | ICD-10-CM

## 2022-02-01 DIAGNOSIS — Z23 NEED FOR TDAP VACCINATION: ICD-10-CM

## 2022-02-01 DIAGNOSIS — U07.1 COVID-19 AFFECTING PREGNANCY, ANTEPARTUM: ICD-10-CM

## 2022-02-01 DIAGNOSIS — O98.519 COVID-19 AFFECTING PREGNANCY, ANTEPARTUM: ICD-10-CM

## 2022-02-01 DIAGNOSIS — Z36.2 ENCOUNTER FOR FOLLOW-UP ULTRASOUND OF FETAL ANATOMY: ICD-10-CM

## 2022-02-01 DIAGNOSIS — O99.013 ANEMIA, ANTEPARTUM, THIRD TRIMESTER: ICD-10-CM

## 2022-02-01 DIAGNOSIS — Z36.89 ENCOUNTER FOR ULTRASOUND TO CHECK FETAL GROWTH: Primary | ICD-10-CM

## 2022-02-01 DIAGNOSIS — Z23 ENCOUNTER FOR IMMUNIZATION: ICD-10-CM

## 2022-02-01 PROCEDURE — 90471 IMMUNIZATION ADMIN: CPT

## 2022-02-01 PROCEDURE — 76816 OB US FOLLOW-UP PER FETUS: CPT | Performed by: OBSTETRICS & GYNECOLOGY

## 2022-02-01 PROCEDURE — 90715 TDAP VACCINE 7 YRS/> IM: CPT

## 2022-02-01 PROCEDURE — PNV: Performed by: OBSTETRICS & GYNECOLOGY

## 2022-02-01 RX ORDER — FERROUS SULFATE 325(65) MG
325 TABLET ORAL
COMMUNITY
End: 2022-08-05

## 2022-02-01 RX ORDER — UREA 10 %
1 LOTION (ML) TOPICAL DAILY
COMMUNITY
End: 2022-08-05

## 2022-02-01 NOTE — PROGRESS NOTES
Pt is a 32 y o   34w3d  Pregnancy is complicated by obesity, anemia, abnormal 1 hour gtt with edith 3 hour gtt  Pt reports she had COVID 2 weeks ago--feeling ok now  Pt reports +FM  Denies vb, lof, ctx  PTL precautions and fkc reviewed  Notes heartburn that started two days ago--advised to try TUMS  TDAP today  3d trimester folder given and reviewed  Delivery consent reviewed and signed  F/u 2 weeks  GBS next visit

## 2022-02-01 NOTE — LETTER
2022    Katalina Jay MD  [address]  [address] [facility] [address]    Patient: Rachid Freedman   YOB: 1995   Date of Visit: 2022   Gestational age 35w0d   Mathew Hanna of this communication: Routine       Dear Mindi Foreman,    This patient was seen recently in our  office  The content of my evaluation today is in the ultrasound report under "OB Procedures" tab  Please don't hesitate to contact our office with any concerns or questions       Sincerely,      Rustam Narvaez MD  Attending Physician, [place]

## 2022-02-02 ENCOUNTER — TELEMEDICINE (OUTPATIENT)
Dept: FAMILY MEDICINE CLINIC | Facility: CLINIC | Age: 27
End: 2022-02-02
Payer: COMMERCIAL

## 2022-02-02 VITALS — TEMPERATURE: 98.5 F

## 2022-02-02 DIAGNOSIS — R53.83 FATIGUE, UNSPECIFIED TYPE: ICD-10-CM

## 2022-02-02 DIAGNOSIS — Z3A.34 34 WEEKS GESTATION OF PREGNANCY: ICD-10-CM

## 2022-02-02 DIAGNOSIS — R05.9 COUGH: ICD-10-CM

## 2022-02-02 DIAGNOSIS — R52 BODY ACHES: Primary | ICD-10-CM

## 2022-02-02 DIAGNOSIS — U07.1 COVID-19: ICD-10-CM

## 2022-02-02 DIAGNOSIS — R68.83 CHILLS: ICD-10-CM

## 2022-02-02 DIAGNOSIS — R51.9 ACUTE NONINTRACTABLE HEADACHE, UNSPECIFIED HEADACHE TYPE: ICD-10-CM

## 2022-02-02 PROCEDURE — U0005 INFEC AGEN DETEC AMPLI PROBE: HCPCS | Performed by: FAMILY MEDICINE

## 2022-02-02 PROCEDURE — 99213 OFFICE O/P EST LOW 20 MIN: CPT | Performed by: FAMILY MEDICINE

## 2022-02-02 PROCEDURE — U0003 INFECTIOUS AGENT DETECTION BY NUCLEIC ACID (DNA OR RNA); SEVERE ACUTE RESPIRATORY SYNDROME CORONAVIRUS 2 (SARS-COV-2) (CORONAVIRUS DISEASE [COVID-19]), AMPLIFIED PROBE TECHNIQUE, MAKING USE OF HIGH THROUGHPUT TECHNOLOGIES AS DESCRIBED BY CMS-2020-01-R: HCPCS | Performed by: FAMILY MEDICINE

## 2022-02-02 NOTE — PROGRESS NOTES
Virtual Regular Visit    Verification of patient location:    Patient is located in the following state in which I hold an active license [place]      Assessment/Plan:    Problem List Items Addressed This Visit     None      Visit Diagnoses     Body aches    -  Primary    Relevant Orders    COVID Only- Office Collect (Completed)    Chills        Relevant Orders    COVID Only- Office Collect (Completed)    Acute nonintractable headache, unspecified headache type        Relevant Orders    COVID Only- Office Collect (Completed)    Fatigue, unspecified type        Relevant Orders    COVID Only- Office Collect (Completed)    34 weeks gestation of pregnancy        Relevant Orders    COVID Only- Office Collect (Completed)    COVID-19        Cough        improving               Reason for visit is   Chief Complaint   Patient presents with    COVID-19    Virtual Regular Visit        Encounter provider Ailyn Abad MD    Provider located at [address] [place] [address]  [address]ia [address] [address] [phone]      Recent Visits  Date Type Provider Dept   02/02/22 Telemedicine Ailyn Abad MD  [facility] [place] [facility]   Showing recent visits within past 7 days and meeting all other requirements  Today's Visits  Date Type Provider Dept   02/03/22 Telephone Ailyn Abad MD  [facility] [place] [place] [facility]   02/03/22 Telephone [facility]ilyn Abad MD  [facility] [place] [place] [facility]   Showing today's visits and meeting all other requirements  Future Appointments  No visits were found meeting these conditions  Showing future appointments within next 150 days and meeting all other requirements       The patient was identified by name and date of birth  Hanane Almanza was informed that this is a telemedicine visit and that the visit is being conducted through [address] [place] [address] and patient was informed that this is a secure, HIPAA-compliant platform  She agrees to proceed     My office door was closed   No one else was in the room  She acknowledged consent and understanding of privacy and security of the video platform  The patient has agreed to participate and understands they can discontinue the visit at any time  Patient is aware this is a billable service  Subjective  Deangelo Saravia is a 32 y o  female    HPI   Patient was diagnosed with COVID about 2 weeks ago  She said her nasal congestion and her cough had improved a lot  Still have infrequent dry cough  Until this morning she felt tired she start having slight headache chills , body ache but she has no fever    She called her OB and they told her to be checked for COVID again  Patient was seen by Ob yesterday and everything was fine she had Tdap done yesterday  Denied chest pain, shortness of breath, fever, a cough stated mild and she does raise sometimes clear from there is no colored from and also she does have a slight runny nose and is so clear  Past Medical History:   Diagnosis Date    Anal itching     since childhood    Chicken pox     Cyst of fallopian tube 2017    Right side, 11 cm, benign    Generalized headaches     Need for HPV vaccination     completed series 2018    Psoriasis        Past Surgical History:   Procedure Laterality Date    NOSE SURGERY      Nasal Septum deviation repair, lazer    [place] REMOVAL OF OVARIAN CYST(S) Right 3/1/2018    Procedure: LAPAROSCOPIC RIGHT SALPINGOCYSTECTOMY;  Surgeon: Rolando Jarrett MD;  Location: [facility];  Service: Gynecology       Current Outpatient Medications   Medication Sig Dispense Refill    aspirin 81 mg chewable tablet Chew 2 tablets (162 mg total) daily 60 tablet 6    calcium carbonate (OS-FAREED) 1250 (500 Ca) MG chewable tablet Chew 1 tablet daily      ferrous sulfate 325 (65 Fe) mg tablet Take 325 mg by mouth daily with breakfast      fluticasone (FLONASE) 50 mcg/act nasal spray 1 spray into each nostril daily 16 g 0    Prenatal Vit-Fe Fumarate-FA (PRENATAL/FOLIC ACID PO) Take by mouth      sodium chloride (OCEAN) 0 65 % nasal spray 1 spray into each nostril as needed for congestion or rhinitis 30 mL 0     No current facility-administered medications for this visit  No Known Allergies    Review of Systems   Constitutional: Positive for chills and fatigue  Negative for activity change, appetite change, fever and unexpected weight change  HENT: Negative for congestion, ear discharge, ear pain, hearing loss, nosebleeds, rhinorrhea, sinus pressure, sore throat, tinnitus, trouble swallowing and voice change  Eyes: Negative for photophobia, pain and visual disturbance  Respiratory: Negative for cough, chest tightness, shortness of breath and wheezing  Cardiovascular: Negative for chest pain, palpitations and leg swelling  Gastrointestinal: Negative for abdominal pain, anal bleeding, blood in stool, constipation, diarrhea, nausea, rectal pain and vomiting  Endocrine: Negative for cold intolerance, heat intolerance, polydipsia and polyuria  Genitourinary: Negative for decreased urine volume, dysuria, flank pain, frequency, genital sores, hematuria, menstrual problem, pelvic pain, urgency, vaginal bleeding and vaginal pain  Musculoskeletal: Positive for myalgias  Negative for arthralgias, back pain, gait problem, joint swelling and neck pain  Skin: Negative for rash  Neurological: Positive for headaches  Negative for dizziness, tremors, seizures, syncope, weakness and light-headedness  Hematological: Negative for adenopathy  Does not bruise/bleed easily  Psychiatric/Behavioral: Negative for agitation, behavioral problems, confusion, dysphoric mood, hallucinations and sleep disturbance  The patient is not nervous/anxious  Video Exam    Vitals:    02/02/22 1548   Temp: 98 5 °F (36 9 °C)       Physical Exam  Constitutional:       General: She is not in acute distress  Appearance: She is well-developed  She is not ill-appearing or diaphoretic     Eyes:      General: No scleral icterus  Right eye: No discharge  Left eye: No discharge  Neck:      Vascular: No JVD  Pulmonary:      Effort: Pulmonary effort is normal       Comments: No abnormal audible breath  Musculoskeletal:      Cervical back: Neck supple  Skin:     Coloration: Skin is not pale  Findings: No rash  Neurological:      Mental Status: She is alert and oriented to person, place, and time  Cranial Nerves: No cranial nerve deficit  Psychiatric:         Mood and Affect: Mood normal          Behavior: Behavior normal          Thought Content: Thought content normal          Judgment: Judgment normal       discussed with patient possibly her symptoms could be secondary to her Tdap she had yesterday  Recommend refer to the ER to rule out underlying any infectious specially the patient is pregnant   pt declined , patient request only to repeat her Covid test   Discussed with patient that  possibly COVID test will stay positive up  to 90 days  advised patient if symptoms persist to reconsider going to the ER    I spent 19 minutes directly with the patient during this visit    VIRTUAL VISIT Basil Levy verbally agrees to participate in [employer]  Pt is aware that [employer] could be limited without vital signs or the ability to perform a full hands-on physical Joshua Jc understands she or the provider may request at any time to terminate the video visit and request the patient to seek care or treatment in person

## 2022-02-03 ENCOUNTER — TELEPHONE (OUTPATIENT)
Dept: FAMILY MEDICINE CLINIC | Facility: CLINIC | Age: 27
End: 2022-02-03

## 2022-02-03 LAB — SARS-COV-2 RNA RESP QL NAA+PROBE: NEGATIVE

## 2022-02-03 NOTE — TELEPHONE ENCOUNTER
Pt had VV yesterday, took covid test and was negative  Is asking for note for work for two days  She reports still feeling tired and achy

## 2022-02-03 NOTE — TELEPHONE ENCOUNTER
I spoke with the patient and she will f/u on Monday with a virtual visit for Dr Cesar Dillon to clear her to go back to work

## 2022-02-05 NOTE — PROGRESS NOTES
[address] [facility] [address]: Ms Carroll Sanchez was seen today for fetal growth and followup missed anatomy ultrasound  See ultrasound report under "OB Procedures" tab  Please don't hesitate to contact our office with any concerns or questions    Ha Solis MD

## 2022-02-07 ENCOUNTER — TELEMEDICINE (OUTPATIENT)
Dept: FAMILY MEDICINE CLINIC | Facility: CLINIC | Age: 27
End: 2022-02-07
Payer: COMMERCIAL

## 2022-02-07 DIAGNOSIS — R52 BODY ACHES: ICD-10-CM

## 2022-02-07 DIAGNOSIS — R53.83 FATIGUE, UNSPECIFIED TYPE: ICD-10-CM

## 2022-02-07 DIAGNOSIS — R68.83 CHILLS: ICD-10-CM

## 2022-02-07 DIAGNOSIS — U07.1 COVID-19: Primary | ICD-10-CM

## 2022-02-07 DIAGNOSIS — Z3A.35 35 WEEKS GESTATION OF PREGNANCY: ICD-10-CM

## 2022-02-07 PROCEDURE — 99213 OFFICE O/P EST LOW 20 MIN: CPT | Performed by: FAMILY MEDICINE

## 2022-02-07 NOTE — PROGRESS NOTES
Virtual Regular Visit    Verification of patient location:    Patient is located in the following state in which I hold an active license [place]      Assessment/Plan:    Problem List Items Addressed This Visit     None      Visit Diagnoses     COVID-19    -  Primary    Symptoms resolved    Body aches        Chills        35 weeks gestation of pregnancy        Doing well  To follow with Ob    Fatigue, unspecified type        All above symptoms resolved  Most likely secondary to her Tdap  Advised patient she can return to work               Reason for visit is   Chief Complaint   Patient presents with    Virtual Regular Visit        Encounter provider Karon Tripathi MD    Provider located at [address] [facility]  [address] [place] [address]  [address] [id number] [phone]      Recent Visits  Date Type Provider Dept   02/07/22 Telemedicine Karon Tripathi MD [facility]   02/03/22 Telephone Karno Tripathi MD [address] [place] [address] Primary Care   02/03/22 Telephone Karon Tripathi MD [facility]   02/02/22 Telemedicine Karon Tripathi MD [facility]   Showing recent visits within past 7 days and meeting all other requirements  Future Appointments  No visits were found meeting these conditions  Showing future appointments within next 150 days and meeting all other requirements       The patient was identified by name and date of birth  Priya Phelan was informed that this is a telemedicine visit and that the visit is being conducted through [address] [place] [address] Now and patient was informed that this is a secure, HIPAA-compliant platform  She agrees to proceed     My office door was closed  No one else was in the room  She acknowledged consent and understanding of privacy and security of the video platform  The patient has agreed to participate and understands they can discontinue the visit at any time  Patient is aware this is a billable service       Subjective  Priya Phelan is a 32 y o  female       HPI   Follow-up  Patient has developed body ache, chills fatigue about 1 week ago the following day she had her Tdap  Patient stated all her symptoms resolved within 2-3 days  Now she feels fine  Doing well with her pregnancy  She can go back to work    COVID test done on February 2, 2022  is negative  Past Medical History:   Diagnosis Date    Anal itching     since childhood    Chicken pox     Cyst of fallopian tube 2017    Right side, 11 cm, benign    Generalized headaches     Need for HPV vaccination     completed series 2018    Psoriasis        Past Surgical History:   Procedure Laterality Date    NOSE SURGERY      Nasal Septum deviation repair, lazer    [place] REMOVAL OF OVARIAN CYST(S) Right 3/1/2018    Procedure: LAPAROSCOPIC RIGHT SALPINGOCYSTECTOMY;  Surgeon: Tommie Stahl MD;  Location: [facility];  Service: Gynecology       Current Outpatient Medications   Medication Sig Dispense Refill    aspirin 81 mg chewable tablet Chew 2 tablets (162 mg total) daily 60 tablet 6    calcium carbonate (OS-FAREED) 1250 (500 Ca) MG chewable tablet Chew 1 tablet daily      ferrous sulfate 325 (65 Fe) mg tablet Take 325 mg by mouth daily with breakfast      Prenatal Vit-Fe Fumarate-FA (PRENATAL/FOLIC ACID PO) Take by mouth      fluticasone (FLONASE) 50 mcg/act nasal spray 1 spray into each nostril daily (Patient not taking: Reported on 2/7/2022 ) 16 g 0    sodium chloride (OCEAN) 0 65 % nasal spray 1 spray into each nostril as needed for congestion or rhinitis (Patient not taking: Reported on 2/7/2022 ) 30 mL 0     No current facility-administered medications for this visit  No Known Allergies    Review of Systems   Constitutional: Negative for chills, diaphoresis and fatigue  HENT: Negative for ear pain, sore throat, trouble swallowing and voice change  Eyes: Negative for visual disturbance  Respiratory: Negative for cough, chest tightness and shortness of breath  Cardiovascular: Negative for chest pain, palpitations and leg swelling  Gastrointestinal: Negative for abdominal pain, blood in stool, constipation, diarrhea and nausea  Endocrine: Negative for polydipsia and polyuria  Genitourinary: Negative for dysuria, flank pain, frequency, hematuria, pelvic pain, urgency, vaginal bleeding and vaginal discharge  Musculoskeletal: Negative for arthralgias, back pain, gait problem, myalgias and neck pain  Skin: Negative for rash  Allergic/Immunologic: Negative for food allergies  Neurological: Negative for dizziness, tremors, seizures, weakness, light-headedness, numbness and headaches  Hematological: Negative for adenopathy  Does not bruise/bleed easily  Psychiatric/Behavioral: Negative for confusion and dysphoric mood  The patient is not nervous/anxious  Video Exam    There were no vitals filed for this visit  Physical Exam  Constitutional:       General: She is not in acute distress  Appearance: Normal appearance  She is well-developed  She is not ill-appearing or diaphoretic  Eyes:      General: No scleral icterus  Neck:      Vascular: No JVD  Pulmonary:      Effort: Pulmonary effort is normal    Musculoskeletal:      Cervical back: Neck supple  Skin:     Coloration: Skin is not pale  Findings: No rash  Neurological:      Mental Status: She is alert and oriented to person, place, and time  Cranial Nerves: No cranial nerve deficit  Psychiatric:         Mood and Affect: Mood normal          Behavior: Behavior normal          Thought Content: Thought content normal          Judgment: Judgment normal       Comments: smiling          I spent 8 minutes directly with the patient during this visit    VIRTUAL VISIT Basil Levy verbally agrees to participate in [employer]   Pt is aware that Virtual Care Services could be limited without vital signs or the ability to perform a full hands-on physical exam  Vera Franky understands she or the provider may request at any time to terminate the video visit and request the patient to seek care or treatment in person

## 2022-02-22 ENCOUNTER — ROUTINE PRENATAL (OUTPATIENT)
Dept: OBGYN CLINIC | Facility: CLINIC | Age: 27
End: 2022-02-22

## 2022-02-22 VITALS
HEIGHT: 67 IN | WEIGHT: 217.6 LBS | SYSTOLIC BLOOD PRESSURE: 110 MMHG | DIASTOLIC BLOOD PRESSURE: 76 MMHG | BODY MASS INDEX: 34.15 KG/M2

## 2022-02-22 DIAGNOSIS — O99.213 OBESITY AFFECTING PREGNANCY IN THIRD TRIMESTER: Primary | ICD-10-CM

## 2022-02-22 DIAGNOSIS — O99.013 ANEMIA, ANTEPARTUM, THIRD TRIMESTER: ICD-10-CM

## 2022-02-22 PROCEDURE — PNV: Performed by: NURSE PRACTITIONER

## 2022-02-22 PROCEDURE — 3008F BODY MASS INDEX DOCD: CPT | Performed by: FAMILY MEDICINE

## 2022-02-22 PROCEDURE — 87150 DNA/RNA AMPLIFIED PROBE: CPT | Performed by: NURSE PRACTITIONER

## 2022-02-22 NOTE — PROGRESS NOTES
Pt is a 32 y o   37w3d  Pregnancy is complicated by obesity, anemia, abnormal 1 hour gtt with normal 3 hour gtt; covid during pregnancy      Baby is active; denies LOF/VB or UCs  S=D, normal FHTs, normal BP    GBS obtained today  RV 1 week

## 2022-02-23 LAB — GP B STREP DNA SPEC QL NAA+PROBE: NEGATIVE

## 2022-03-01 ENCOUNTER — ROUTINE PRENATAL (OUTPATIENT)
Dept: OBGYN CLINIC | Facility: CLINIC | Age: 27
End: 2022-03-01

## 2022-03-01 VITALS
OXYGEN SATURATION: 99 % | SYSTOLIC BLOOD PRESSURE: 122 MMHG | BODY MASS INDEX: 34.69 KG/M2 | HEART RATE: 102 BPM | HEIGHT: 67 IN | WEIGHT: 221 LBS | DIASTOLIC BLOOD PRESSURE: 78 MMHG

## 2022-03-01 DIAGNOSIS — O98.519 COVID-19 AFFECTING PREGNANCY, ANTEPARTUM: ICD-10-CM

## 2022-03-01 DIAGNOSIS — O99.013 ANEMIA, ANTEPARTUM, THIRD TRIMESTER: ICD-10-CM

## 2022-03-01 DIAGNOSIS — O99.213 OBESITY AFFECTING PREGNANCY IN THIRD TRIMESTER: Primary | ICD-10-CM

## 2022-03-01 DIAGNOSIS — U07.1 COVID-19 AFFECTING PREGNANCY, ANTEPARTUM: ICD-10-CM

## 2022-03-01 PROCEDURE — PNV: Performed by: OBSTETRICS & GYNECOLOGY

## 2022-03-01 NOTE — PROGRESS NOTES
Pt is a 32 y o   38w3d  Pregnancy is complicated by obesity, anemia, abnormal 1 hour gtt with normal 3 hour gtt, COVID during pregnancy  Pt reports +FM  Denies vb, lof  Reports irregular ctx  Labor precautions and fkc reviewed  GBS negative and reviewed with patient  SVE: cl/40/-2, posterior, soft  Pt interested in elective IOL--will plan on 3/10 in pm  IOL consent reviewed and signed  F/u 1 week

## 2022-03-08 ENCOUNTER — ROUTINE PRENATAL (OUTPATIENT)
Dept: OBGYN CLINIC | Facility: CLINIC | Age: 27
End: 2022-03-08

## 2022-03-08 VITALS
BODY MASS INDEX: 34.28 KG/M2 | SYSTOLIC BLOOD PRESSURE: 118 MMHG | WEIGHT: 218.4 LBS | HEIGHT: 67 IN | DIASTOLIC BLOOD PRESSURE: 78 MMHG

## 2022-03-08 DIAGNOSIS — O98.519 COVID-19 AFFECTING PREGNANCY, ANTEPARTUM: ICD-10-CM

## 2022-03-08 DIAGNOSIS — O99.213 OBESITY AFFECTING PREGNANCY IN THIRD TRIMESTER: Primary | ICD-10-CM

## 2022-03-08 DIAGNOSIS — O99.013 ANEMIA, ANTEPARTUM, THIRD TRIMESTER: ICD-10-CM

## 2022-03-08 DIAGNOSIS — U07.1 COVID-19 AFFECTING PREGNANCY, ANTEPARTUM: ICD-10-CM

## 2022-03-08 PROCEDURE — PNV: Performed by: OBSTETRICS & GYNECOLOGY

## 2022-03-08 PROCEDURE — 3008F BODY MASS INDEX DOCD: CPT | Performed by: FAMILY MEDICINE

## 2022-03-08 NOTE — PROGRESS NOTES
Pt is a 32 y o   39w3d  Pregnancy is complicated by obesity, anemia, abnormal 1 hour GTT with normal 3 hour GTT, COVID during pregnancy  Pt reports +FM  Denies vb, lof  Notes irregular ctx  Labor precautions  and [facility] reviewed  SVE: -/-2, soft, midposition  IOL scheduled for 3/10 at 8 pm  Pt reports that her  wants her to cancel it  She will discuss with him further and let us know if she will cancel it    F/U 1 week or for IOL

## 2022-03-09 ENCOUNTER — TELEPHONE (OUTPATIENT)
Dept: OBGYN CLINIC | Facility: CLINIC | Age: 27
End: 2022-03-09

## 2022-03-09 NOTE — TELEPHONE ENCOUNTER
Called patient to let her know that spotting after an examination is normal, however there was no answer and she does not have her voicemail set up   Please reassure her in the am     I left her induction as scheduled

## 2022-03-09 NOTE — TELEPHONE ENCOUNTER
Pt called stating she would like to be induced tomorrow 3/10/22 as planned  She also states after the internal exam she started to have spotting

## 2022-03-10 ENCOUNTER — HOSPITAL ENCOUNTER (INPATIENT)
Facility: HOSPITAL | Age: 27
LOS: 4 days | Discharge: HOME | End: 2022-03-14
Attending: OBSTETRICS & GYNECOLOGY | Admitting: OBSTETRICS & GYNECOLOGY
Payer: COMMERCIAL

## 2022-03-10 ENCOUNTER — HOSPITAL ENCOUNTER (OUTPATIENT)
Dept: LABOR AND DELIVERY | Facility: HOSPITAL | Age: 27
Discharge: HOME | End: 2022-03-10
Payer: COMMERCIAL

## 2022-03-10 PROBLEM — Z53.20 HIV SCREENING DECLINED: Status: RESOLVED | Noted: 2019-11-26 | Resolved: 2022-03-10

## 2022-03-10 PROBLEM — Z3A.39 39 WEEKS GESTATION OF PREGNANCY: Status: ACTIVE | Noted: 2022-03-10

## 2022-03-10 LAB
ABO GROUP BLD: NORMAL
BLD GP AB SCN SERPL QL: NEGATIVE
CREAT UR-MCNC: 188.2 MG/DL
ERYTHROCYTE [DISTWIDTH] IN BLOOD BY AUTOMATED COUNT: 15.3 % (ref 11.6–15.1)
HCT VFR BLD AUTO: 33.7 % (ref 34.8–46.1)
HGB BLD-MCNC: 11.4 G/DL (ref 11.5–15.4)
MCH RBC QN AUTO: 26.5 PG (ref 26.8–34.3)
MCHC RBC AUTO-ENTMCNC: 33.8 G/DL (ref 31.4–37.4)
MCV RBC AUTO: 78 FL (ref 82–98)
PLATELET # BLD AUTO: 196 THOUSANDS/UL (ref 149–390)
PMV BLD AUTO: 11.5 FL (ref 8.9–12.7)
PROT UR-MCNC: 19 MG/DL
PROT/CREAT UR: 0.1 MG/G{CREAT} (ref 0–0.1)
RBC # BLD AUTO: 4.3 MILLION/UL (ref 3.81–5.12)
RH BLD: POSITIVE
SPECIMEN EXPIRATION DATE: NORMAL
WBC # BLD AUTO: 8.99 THOUSAND/UL (ref 4.31–10.16)

## 2022-03-10 PROCEDURE — 84156 ASSAY OF PROTEIN URINE: CPT | Performed by: OBSTETRICS & GYNECOLOGY

## 2022-03-10 PROCEDURE — 82570 ASSAY OF URINE CREATININE: CPT | Performed by: OBSTETRICS & GYNECOLOGY

## 2022-03-10 PROCEDURE — 86901 BLOOD TYPING SEROLOGIC RH(D): CPT | Performed by: OBSTETRICS & GYNECOLOGY

## 2022-03-10 PROCEDURE — NC001 PR NO CHARGE: Performed by: OBSTETRICS & GYNECOLOGY

## 2022-03-10 PROCEDURE — 86592 SYPHILIS TEST NON-TREP QUAL: CPT | Performed by: OBSTETRICS & GYNECOLOGY

## 2022-03-10 PROCEDURE — 86850 RBC ANTIBODY SCREEN: CPT | Performed by: OBSTETRICS & GYNECOLOGY

## 2022-03-10 PROCEDURE — 80053 COMPREHEN METABOLIC PANEL: CPT | Performed by: OBSTETRICS & GYNECOLOGY

## 2022-03-10 PROCEDURE — 85027 COMPLETE CBC AUTOMATED: CPT | Performed by: OBSTETRICS & GYNECOLOGY

## 2022-03-10 PROCEDURE — 86900 BLOOD TYPING SEROLOGIC ABO: CPT | Performed by: OBSTETRICS & GYNECOLOGY

## 2022-03-10 RX ORDER — BUTORPHANOL TARTRATE 1 MG/ML
1 INJECTION, SOLUTION INTRAMUSCULAR; INTRAVENOUS ONCE
Status: COMPLETED | OUTPATIENT
Start: 2022-03-10 | End: 2022-03-10

## 2022-03-10 RX ORDER — SODIUM CHLORIDE, SODIUM LACTATE, POTASSIUM CHLORIDE, CALCIUM CHLORIDE 600; 310; 30; 20 MG/100ML; MG/100ML; MG/100ML; MG/100ML
125 INJECTION, SOLUTION INTRAVENOUS CONTINUOUS
Status: DISCONTINUED | OUTPATIENT
Start: 2022-03-10 | End: 2022-03-12

## 2022-03-10 RX ORDER — ONDANSETRON 2 MG/ML
4 INJECTION INTRAMUSCULAR; INTRAVENOUS EVERY 6 HOURS PRN
Status: DISCONTINUED | OUTPATIENT
Start: 2022-03-10 | End: 2022-03-14 | Stop reason: HOSPADM

## 2022-03-10 RX ORDER — PROMETHAZINE HYDROCHLORIDE 25 MG/ML
25 INJECTION, SOLUTION INTRAMUSCULAR; INTRAVENOUS EVERY 6 HOURS PRN
Status: DISCONTINUED | OUTPATIENT
Start: 2022-03-10 | End: 2022-03-12

## 2022-03-10 RX ADMIN — Medication 25 MCG: at 21:30

## 2022-03-10 RX ADMIN — SODIUM CHLORIDE, SODIUM LACTATE, POTASSIUM CHLORIDE, AND CALCIUM CHLORIDE 125 ML/HR: .6; .31; .03; .02 INJECTION, SOLUTION INTRAVENOUS at 21:30

## 2022-03-10 RX ADMIN — PROMETHAZINE HYDROCHLORIDE 25 MG: 25 INJECTION INTRAMUSCULAR; INTRAVENOUS at 22:38

## 2022-03-10 RX ADMIN — BUTORPHANOL TARTRATE 1 MG: 1 INJECTION, SOLUTION INTRAMUSCULAR; INTRAVENOUS at 22:38

## 2022-03-11 ENCOUNTER — ANESTHESIA EVENT (INPATIENT)
Dept: ANESTHESIOLOGY | Facility: HOSPITAL | Age: 27
End: 2022-03-11
Payer: COMMERCIAL

## 2022-03-11 ENCOUNTER — ANESTHESIA (INPATIENT)
Dept: ANESTHESIOLOGY | Facility: HOSPITAL | Age: 27
End: 2022-03-11
Payer: COMMERCIAL

## 2022-03-11 LAB
ALBUMIN SERPL BCP-MCNC: 2.5 G/DL (ref 3.5–5)
ALP SERPL-CCNC: 139 U/L (ref 46–116)
ALT SERPL W P-5'-P-CCNC: 8 U/L (ref 12–78)
ANION GAP SERPL CALCULATED.3IONS-SCNC: 12 MMOL/L (ref 4–13)
AST SERPL W P-5'-P-CCNC: 19 U/L (ref 5–45)
BILIRUB SERPL-MCNC: 0.16 MG/DL (ref 0.2–1)
BUN SERPL-MCNC: 11 MG/DL (ref 5–25)
CALCIUM ALBUM COR SERPL-MCNC: 9.5 MG/DL (ref 8.3–10.1)
CALCIUM SERPL-MCNC: 8.3 MG/DL (ref 8.3–10.1)
CHLORIDE SERPL-SCNC: 106 MMOL/L (ref 100–108)
CO2 SERPL-SCNC: 20 MMOL/L (ref 21–32)
CREAT SERPL-MCNC: 0.71 MG/DL (ref 0.6–1.3)
GFR SERPL CREATININE-BSD FRML MDRD: 117 ML/MIN/1.73SQ M
GLUCOSE SERPL-MCNC: 102 MG/DL (ref 65–140)
HOLD SPECIMEN: NORMAL
POTASSIUM SERPL-SCNC: 3.8 MMOL/L (ref 3.5–5.3)
PROT SERPL-MCNC: 6.4 G/DL (ref 6.4–8.2)
RPR SER QL: NORMAL
SODIUM SERPL-SCNC: 138 MMOL/L (ref 136–145)

## 2022-03-11 PROCEDURE — 10H07YZ INSERTION OF OTHER DEVICE INTO PRODUCTS OF CONCEPTION, VIA NATURAL OR ARTIFICIAL OPENING: ICD-10-PCS | Performed by: OBSTETRICS & GYNECOLOGY

## 2022-03-11 PROCEDURE — 3E033VJ INTRODUCTION OF OTHER HORMONE INTO PERIPHERAL VEIN, PERCUTANEOUS APPROACH: ICD-10-PCS | Performed by: OBSTETRICS & GYNECOLOGY

## 2022-03-11 PROCEDURE — 10907ZC DRAINAGE OF AMNIOTIC FLUID, THERAPEUTIC FROM PRODUCTS OF CONCEPTION, VIA NATURAL OR ARTIFICIAL OPENING: ICD-10-PCS | Performed by: OBSTETRICS & GYNECOLOGY

## 2022-03-11 PROCEDURE — 4A1H7CZ MONITORING OF PRODUCTS OF CONCEPTION, CARDIAC RATE, VIA NATURAL OR ARTIFICIAL OPENING: ICD-10-PCS | Performed by: OBSTETRICS & GYNECOLOGY

## 2022-03-11 PROCEDURE — 3E0DXGC INTRODUCTION OF OTHER THERAPEUTIC SUBSTANCE INTO MOUTH AND PHARYNX, EXTERNAL APPROACH: ICD-10-PCS | Performed by: OBSTETRICS & GYNECOLOGY

## 2022-03-11 RX ORDER — FENTANYL CITRATE 50 UG/ML
INJECTION, SOLUTION INTRAMUSCULAR; INTRAVENOUS AS NEEDED
Status: DISCONTINUED | OUTPATIENT
Start: 2022-03-11 | End: 2022-03-12 | Stop reason: HOSPADM

## 2022-03-11 RX ORDER — ROPIVACAINE HYDROCHLORIDE 2 MG/ML
INJECTION, SOLUTION EPIDURAL; INFILTRATION; PERINEURAL CONTINUOUS PRN
Status: DISCONTINUED | OUTPATIENT
Start: 2022-03-11 | End: 2022-03-12 | Stop reason: HOSPADM

## 2022-03-11 RX ORDER — CALCIUM CARBONATE 200(500)MG
1000 TABLET,CHEWABLE ORAL 3 TIMES DAILY PRN
Status: DISCONTINUED | OUTPATIENT
Start: 2022-03-11 | End: 2022-03-14 | Stop reason: HOSPADM

## 2022-03-11 RX ORDER — OXYTOCIN/RINGER'S LACTATE 30/500 ML
1-30 PLASTIC BAG, INJECTION (ML) INTRAVENOUS
Status: DISCONTINUED | OUTPATIENT
Start: 2022-03-11 | End: 2022-03-12

## 2022-03-11 RX ORDER — FENTANYL CITRATE 50 UG/ML
INJECTION, SOLUTION INTRAMUSCULAR; INTRAVENOUS
Status: COMPLETED
Start: 2022-03-11 | End: 2022-03-11

## 2022-03-11 RX ORDER — ROPIVACAINE HYDROCHLORIDE 5 MG/ML
INJECTION, SOLUTION EPIDURAL; INFILTRATION; PERINEURAL AS NEEDED
Status: DISCONTINUED | OUTPATIENT
Start: 2022-03-11 | End: 2022-03-12 | Stop reason: HOSPADM

## 2022-03-11 RX ORDER — LIDOCAINE HYDROCHLORIDE AND EPINEPHRINE 15; 5 MG/ML; UG/ML
INJECTION, SOLUTION EPIDURAL
Status: COMPLETED | OUTPATIENT
Start: 2022-03-11 | End: 2022-03-11

## 2022-03-11 RX ORDER — ACETAMINOPHEN 325 MG/1
650 TABLET ORAL EVERY 6 HOURS PRN
Status: DISCONTINUED | OUTPATIENT
Start: 2022-03-11 | End: 2022-03-12

## 2022-03-11 RX ADMIN — SODIUM CHLORIDE, SODIUM LACTATE, POTASSIUM CHLORIDE, AND CALCIUM CHLORIDE 125 ML/HR: .6; .31; .03; .02 INJECTION, SOLUTION INTRAVENOUS at 23:03

## 2022-03-11 RX ADMIN — Medication 2 MILLI-UNITS/MIN: at 02:27

## 2022-03-11 RX ADMIN — SODIUM CHLORIDE, SODIUM LACTATE, POTASSIUM CHLORIDE, AND CALCIUM CHLORIDE 125 ML/HR: .6; .31; .03; .02 INJECTION, SOLUTION INTRAVENOUS at 04:32

## 2022-03-11 RX ADMIN — ROPIVACAINE HYDROCHLORIDE 10 ML/HR: 2 INJECTION, SOLUTION EPIDURAL; INFILTRATION; PERINEURAL at 08:48

## 2022-03-11 RX ADMIN — ROPIVACAINE HYDROCHLORIDE 3 ML: 5 INJECTION, SOLUTION EPIDURAL; INFILTRATION; PERINEURAL at 18:58

## 2022-03-11 RX ADMIN — SODIUM CHLORIDE, SODIUM LACTATE, POTASSIUM CHLORIDE, AND CALCIUM CHLORIDE 125 ML/HR: .6; .31; .03; .02 INJECTION, SOLUTION INTRAVENOUS at 20:40

## 2022-03-11 RX ADMIN — ACETAMINOPHEN 650 MG: 325 TABLET, FILM COATED ORAL at 20:39

## 2022-03-11 RX ADMIN — FENTANYL CITRATE 100 MCG: 50 INJECTION INTRAMUSCULAR; INTRAVENOUS at 18:58

## 2022-03-11 RX ADMIN — ROPIVACAINE HYDROCHLORIDE: 2 INJECTION, SOLUTION EPIDURAL; INFILTRATION at 17:37

## 2022-03-11 RX ADMIN — LIDOCAINE HYDROCHLORIDE AND EPINEPHRINE 5 ML: 15; 5 INJECTION, SOLUTION EPIDURAL at 08:42

## 2022-03-11 RX ADMIN — SODIUM CHLORIDE, SODIUM LACTATE, POTASSIUM CHLORIDE, AND CALCIUM CHLORIDE 125 ML/HR: .6; .31; .03; .02 INJECTION, SOLUTION INTRAVENOUS at 09:30

## 2022-03-11 NOTE — OB LABOR/OXYTOCIN SAFETY PROGRESS
Oxytocin Safety Progress Check Note - Sam Schooling 32 y o  female MRN: [id number]    Unit/Bed#: L&D 326-01 Encounter: [id number]    Dose (camilla-units/min) Oxytocin: 18 camilla-units/min  Contraction Frequency (minutes): 2-5  Contraction Quality: Mild,Moderate  Tachysystole: No   Cervical Dilation: 4-5        Cervical Effacement: 70  Fetal Station: -1  Baseline Rate: 125 bpm  Fetal Heart Rate: 132 BPM  FHR Category: Category I               Vital Signs:   Vitals:    03/11/22 0645   BP: 115/60   Pulse: 84   Resp:    Temp:        Notes/comments:   Pt reports her contractions are painful--she desires epidural  Membranes stripped  Re-evaluate after epidural  Consider AROM  Continue pitocin titration    Cindy Steve MD 3/11/2022 8:21 AM

## 2022-03-11 NOTE — OB LABOR/OXYTOCIN SAFETY PROGRESS
Oxytocin Safety Progress Check Note - Yesika Kaufman 32 y o  female MRN: [id number]    Unit/Bed#: L&D 326-01 Encounter: [id number]    Dose (camilla-units/min) Oxytocin: 20 cmailla-units/min  Contraction Frequency (minutes): 2-3 (difficulty tracing d/t maternal position)  Contraction Quality: Moderate  Tachysystole: No   Cervical Dilation: 4-5        Cervical Effacement: 70  Fetal Station: -1  Baseline Rate: 120 bpm  Fetal Heart Rate: 132 BPM  FHR Category: Category I               Vital Signs:   Vitals:    03/11/22 0914   BP: 115/59   Pulse: (!) 118   Resp:    Temp:        Notes/comments:   SVE deferred at this time  FHT Category I  Continue pitocin titration      Minh Smith MD 3/11/2022 10:27 AM

## 2022-03-11 NOTE — OB LABOR/OXYTOCIN SAFETY PROGRESS
Oxytocin Safety Progress Check Note - Sam Valdez 32 y o  female MRN: [id number]    Unit/Bed#: L&D 326-01 Encounter: [id number]    Dose (camilla-units/min) Oxytocin: 14 camilla-units/min  Contraction Frequency (minutes): 3  Contraction Quality: Mild  Tachysystole: No   Cervical Dilation: 4  Cervical Effacement: 50  Fetal Station: -2  Baseline Rate: 120 bpm  Fetal Heart Rate: 132 BPM  FHR Category: Category I      Vital Signs:   Vitals:    03/11/22 0615   BP: 128/77   Pulse: 82   Resp:    Temp:        Notes/comments:   Resting comfortably  Cervical exam unchanged from prior  Tracing category I  Continue Pitocin titration  Will discuss with Dr Chica Chavira and Dr Lila Henderson MD 3/11/2022 6:30 AM

## 2022-03-11 NOTE — OB LABOR/OXYTOCIN SAFETY PROGRESS
Labor Progress Note - Rich Godwin 32 y o  female MRN: [id number]    Unit/Bed#: L&D 326-01 Encounter: [id number]       Contraction Frequency (minutes): 3  Contraction Quality: Mild  Tachysystole: No   Cervical Dilation: 4  Cervical Effacement: 50  Fetal Station: -2  Baseline Rate: 110 bpm  Fetal Heart Rate: 116 BPM  FHR Category: Category I      Vital Signs:   Vitals:    03/11/22 0105   BP: 123/68   Pulse: 75   Resp:    Temp:        Notes/comments:   Starting to feel contractions  Cervical exam as above  Will start Pitocin at this time  Tracing currnetly cat I  To be discussed with Dr Paulette Sultana MD 3/11/2022 2:12 AM

## 2022-03-11 NOTE — OB LABOR/OXYTOCIN SAFETY PROGRESS
Oxytocin Safety Progress Check Note - Solomon Quach 32 y o  female MRN: [id number]    Unit/Bed#: L&D 326-01 Encounter: [id number]    Dose (camilla-units/min) Oxytocin: 4 camilla-units/min  Contraction Frequency (minutes): 2-3  Contraction Quality: Mild  Tachysystole: No   Cervical Dilation:  (deferred)  Cervical Effacement:  (deferred)  Fetal Station:  (deferred)  Baseline Rate: 120 bpm  Fetal Heart Rate: 120 BPM  FHR Category: Category I      Vital Signs:   Vitals:    03/11/22 0305   BP: 112/66   Pulse: 73   Resp:    Temp:        Notes/comments:   Resting comfortably  Fetal heart tarcing is category I  Continue pitocin titration and monitoring  Dr Delfina Real aware      Jose Tello MD 3/11/2022 3:42 AM

## 2022-03-11 NOTE — H&P
H&P - Obstetrics   Meaghan Pham 32 y o  female MRN: [id number]  Unit/Bed#: L&D 326-01 Encounter: [id number]    Assessment and Plan:  32 y o  Milton Briones who is being admitted for elective induction  By issue:    * 39 weeks gestation of pregnancy  Assessment & Plan  Clinical EFW by Leopold's: 7-8lbs  Follow up CBC, RPR, blood type & antibody screen  Plan for ripening with Cytotec, sawyer balloon  GBS negative status: PCN not indicated for prophylaxis   Analgesia and/or epidural at patient request      Plan of care discussed with Dr Syed Moreno  This patient will be an INPATIENT and I certify the anticipated length of stay is >2 Midnights  History of Present Illness     Chief Complaint: elective induction    History of Present Illness:  Meaghan Pham is a 32 y o  Milton Briones with an EMMANUEL of 3/12/2022, by Last Menstrual Period at 39w5d weeks gestation who presents for scheduled, elective induction of labor  No contractions or loss of fluid  Some vaginal spotting after last cervical check outpatient that has since resolved  Feels fetal movement and pelvic pressure  Overall feels well and offered no other complaints on review  ROS otherwise negative  Obstetrician: Dr Brittene Murcia    Pregnancy complications:   1  Obesity with current BMI 33    OB History    Para Term  AB Living   1 0 0 0 0 0   SAB IAB Ectopic Multiple Live Births   0 0 0 0 0      # Outcome Date GA Lbr Kee/2nd Weight Sex Delivery Anes PTL Lv   1 Current               Obstetric Comments   Menarche: 13      28/5-6/super tampon every 2-4 hours        Baby complications/comments: EFW 2399 grams - 5 lbs 5 oz (43%) on 22      Review of Systems  12-point ROS negative unless stated in HPI      Historical Information   Past Medical History:   Diagnosis Date    Anal itching     since childhood    Chicken pox     Cyst of fallopian tube 2017    Right side, 11 cm, benign    Generalized headaches     Need for HPV vaccination     completed series 2018    Psoriasis      Past Surgical History:   Procedure Laterality Date    NOSE SURGERY      Nasal Septum deviation repair, lazer    [place] REMOVAL OF OVARIAN CYST(S) Right 3/1/2018    Procedure: LAPAROSCOPIC RIGHT SALPINGOCYSTECTOMY;  Surgeon: Can Cervantes MD;  Location: [facility] OR;  Service: Gynecology     Social History   Social History     Substance and Sexual Activity   Alcohol Use Not Currently    Comment: occasional- stopped     Social History     Substance and Sexual Activity   Drug Use No     Social History     Tobacco Use   Smoking Status Former Smoker   Smokeless Tobacco Current User   Tobacco Comment    pt quit smoking [employer]     Family History: non-contributory    Meds/Allergies      Medications Prior to Admission   Medication    aspirin 81 mg chewable tablet    calcium carbonate (OS-FAREED) 1250 (500 Ca) MG chewable tablet    ferrous sulfate 325 (65 Fe) mg tablet    Prenatal Vit-Fe Fumarate-FA (PRENATAL/FOLIC ACID PO)    fluticasone (FLONASE) 50 mcg/act nasal spray    sodium chloride (OCEAN) 0 65 % nasal spray      No Known Allergies    Objective:  Vitals: Blood pressure 128/72, pulse 68, temperature 98 1 °F (36 7 °C), temperature source Oral, resp  rate 16, height 5' 7" (1 702 m), weight 98 4 kg (217 lb), last menstrual period 06/05/2021, not currently breastfeeding  Body mass index is 33 99 kg/m²  Physical Exam  GEN: alert and oriented x 3, no apparent distress, appears well  CARDIAC: regular rate and rhythm  PULMONARY: clear to auscultation bilaterally  ABDOMEN: gravid, soft, no tenderness  GENITOURINARY: normal external female genitalia  Initial cervical exam 1 5/50/-2, posterior   EXTREMITIES: nontender, trace edema  FETAL ASSESSMENT:  Fetal heart rate: 125/moderate variability/15x15 accelerations/no decelerations;  Reactive  [place]: no contractions    Prenatal Labs:   Blood Type:   Lab Results   Component Value Date/Time    ABO Grouping A 03/10/2022 09:01 PM     , D (Rh type):   Lab Results Component Value Date/Time    Rh Factor Positive 03/10/2022 09:01 PM     , HCT/HGB:   Lab Results   Component Value Date/Time    Hematocrit 33 7 (L) 03/10/2022 09:01 PM    Hemoglobin 11 4 (L) 03/10/2022 09:01 PM      , MCV:   Lab Results   Component Value Date/Time    MCV 78 (L) 03/10/2022 09:01 PM      , Platelets:   Lab Results   Component Value Date/Time    Platelets 101 04/13/7505 09:01 PM      , 3 hour GTT:   Lab Results   Component Value Date/Time    Glucose, GTT - 3 Hour 104 01/03/2022 11:55 AM   , Rubella:   Lab Results   Component Value Date/Time    Rubella IgG Quant 154 0 08/26/2021 06:41 PM        , VDRL/RPR:   Lab Results   Component Value Date/Time    RPR Non-Reactive 12/19/2021 11:07 AM      , Urine Culture/Screen:   Lab Results   Component Value Date/Time    Urine Culture 10,000-19,000 cfu/ml  01/18/2022 05:34 PM       , Hep B:   Lab Results   Component Value Date/Time    Hepatitis B Surface Ag Non-reactive 08/26/2021 06:41 PM     , HIV:   Lab Results   Component Value Date/Time    HIV-1/HIV-2 Ab Non-Reactive 08/26/2021 06:41 PM     , Chlamydia:   Lab Results   Component Value Date/Time    External Chlamydia Screen Negative 07/19/2021 12:00 AM     , Gonorrhea:   Lab Results   Component Value Date/Time    N gonorrhoeae, DNA Probe Negative 07/19/2021 10:25 AM         Invasive Devices  Report    Peripheral Intravenous Line            Peripheral IV 03/10/22 Distal;Left;Ventral (anterior) Forearm <1 day                  Liliana Downing MD  PGY-II, OBGYN  3/11/2022, 12:09 AM

## 2022-03-11 NOTE — OB LABOR/OXYTOCIN SAFETY PROGRESS
Oxytocin Safety Progress Check Note - Sam Valdez 32 y o  female MRN: [id number]    Unit/Bed#: L&D 326-01 Encounter: [id number]    Dose (camilla-units/min) Oxytocin: 20 camilla-units/min  Contraction Frequency (minutes): 2-3  Contraction Quality: Moderate  Tachysystole: No   Cervical Dilation: 4-5        Cervical Effacement: 60  Fetal Station: -1  Baseline Rate: 125 bpm  Fetal Heart Rate: 132 BPM  FHR Category: Category I               Vital Signs:   Vitals:    03/11/22 0914   BP: 115/59   Pulse: (!) 118   Resp:    Temp:        Notes/comments:   Pt comfortable with epidural  AROM performed without complication  IUPC placed per nursing request as having difficulty tracing contractions when patient is on side  Lacey catheter placed as well    Cindy Steve MD 3/11/2022 11:35 AM

## 2022-03-11 NOTE — OB LABOR/OXYTOCIN SAFETY PROGRESS
Oxytocin Safety Progress Check Note - Clarisse Thacker 32 y o  female MRN: [id number]    Unit/Bed#: L&D 326-01 Encounter: [id number]    Dose (camilla-units/min) Oxytocin: 20 camilla-units/min  Contraction Frequency (minutes): 1 5-2 5  Contraction Quality: Strong  Tachysystole: No   Cervical Dilation: 5-6        Cervical Effacement: 60  Fetal Station: -1  Baseline Rate: 125 bpm  Fetal Heart Rate: 132 BPM  FHR Category: Category I           Vital Signs:  Vitals:    03/11/22 1533   BP: 129/74   Pulse: 96   Resp:    Temp:        Notes/comments:   Pt is resting comfortably in bed  She reports mild right-sided pain that she has relief from when she uses the PCA  SVE as above and unchanged from prior exam  FHT cat 1 with contractions q1-3 mins with adequate MVUs of 200-250  Pitocin at 20, will continue at this rate given she is adequate  Will place patient on peanut ball at this time      Dr Pedersen Speaker aware        Gayle Calderon MD 3/11/2022 4:13 PM

## 2022-03-11 NOTE — ANESTHESIA PROCEDURE NOTES
Epidural Block    Patient location during procedure: OB  Start time: 3/11/2022 8:40 AM  Reason for block: procedure for pain and at surgeon's request  Staffing  Performed: Anesthesiologist   Anesthesiologist: Oscar Hardin DO  Preanesthetic Checklist  Completed: patient identified, IV checked, site marked, risks and benefits discussed, surgical consent, monitors and equipment checked, pre-op evaluation and timeout performed  Epidural  Patient position: sitting  Prep: Betadine  Patient monitoring: cardiac monitor and frequent blood pressure checks  Approach: midline  Location: lumbar  Injection technique: JT saline  Needle  Needle type: Tuohy   Needle gauge: 18 G  Catheter type: side hole  Catheter size: 20 G  Catheter securement method: surgical tape  Test dose: negativelidocaine 1 5% with epinephrine 1:200,000 test dose, 5 mL  Assessment  Number of attempts: 1negative aspiration for CSF, negative aspiration for heme and no paresthesia on injection  patient tolerated the procedure well with no immediate complications

## 2022-03-11 NOTE — ASSESSMENT & PLAN NOTE
Clinical EFW by Leopold's: 7-8lbs  Follow up CBC, RPR, blood type & antibody screen  Plan for ripening with Cytotec, sawyer balloon  GBS negative status: PCN not indicated for prophylaxis   Analgesia and/or epidural at patient request

## 2022-03-11 NOTE — OB LABOR/OXYTOCIN SAFETY PROGRESS
Oxytocin Safety Progress Check Note - Opal Ny 32 y o  female MRN: [id number]    Unit/Bed#: L&D 326-01 Encounter: [id number]    Dose (camilla-units/min) Oxytocin: 20 camilla-units/min  Contraction Frequency (minutes): 2-3  Contraction Quality: Moderate  Tachysystole: No   Cervical Dilation: 5-6        Cervical Effacement: 60  Fetal Station: -1  Baseline Rate: 135 bpm  Fetal Heart Rate: 132 BPM  FHR Category: Category I           Vital Signs:   Vitals:    03/11/22 1333   BP: 120/56   Pulse: 82   Resp:    Temp:      Notes/comments:   Pt is complaining of right-sided pelvic pain  She has not tried the PCA, advised to try it  SVE as above  FTH cat 1 with contractions q2-3 mins, adequate MVAs with IUPC  Will recheck in 2h or as clinically indicated      Dr Renetta Barnett aware      Geneva Hernadez MD 3/11/2022 2:02 PM

## 2022-03-11 NOTE — OB LABOR/OXYTOCIN SAFETY PROGRESS
Oxytocin Safety Progress Check Note - Lanie Biggs 32 y o  female MRN: [id number]    Unit/Bed#: L&D 326-01 Encounter: [id number]    Dose (camilla-units/min) Oxytocin: 20 camilla-units/min  Contraction Frequency (minutes): 2  Contraction Quality: Moderate  Tachysystole: No   Cervical Dilation: 5        Cervical Effacement: 70  Fetal Station: -1  Baseline Rate: 140 bpm  Fetal Heart Rate: 132 BPM  FHR Category: Category I               Vital Signs:   Vitals:    03/11/22 1717   BP: 125/73   Pulse: 101   Resp:    Temp:        Notes/comments:   Pt reports some pain on the right side  Notes increasing vaginal pressure  Advised pt to use epidural bolus button  If no improvement will contact anesthesia  MVUs adequate  Continue current management       Aleks Horn MD 3/11/2022 5:53 PM

## 2022-03-12 LAB
BASE EXCESS BLDCOA CALC-SCNC: -9.8 MMOL/L (ref 3–11)
BASE EXCESS BLDCOV CALC-SCNC: -5.8 MMOL/L (ref 1–9)
ERYTHROCYTE [DISTWIDTH] IN BLOOD BY AUTOMATED COUNT: 15.1 % (ref 11.6–15.1)
HCO3 BLDCOA-SCNC: 17.9 MMOL/L (ref 17.3–27.3)
HCO3 BLDCOV-SCNC: 20.3 MMOL/L (ref 12.2–28.6)
HCT VFR BLD AUTO: 29.4 % (ref 34.8–46.1)
HGB BLD-MCNC: 10.2 G/DL (ref 11.5–15.4)
MCH RBC QN AUTO: 26.8 PG (ref 26.8–34.3)
MCHC RBC AUTO-ENTMCNC: 34.7 G/DL (ref 31.4–37.4)
MCV RBC AUTO: 77 FL (ref 82–98)
O2 CT VFR BLDCOA CALC: 2.4 ML/DL
OXYHGB MFR BLDCOA: 12.1 %
OXYHGB MFR BLDCOV: 35.5 %
PCO2 BLDCOA: 45.6 MM[HG] (ref 30–60)
PCO2 BLDCOV: 42.3 MM HG (ref 27–43)
PH BLDCOA: 7.21 [PH] (ref 7.23–7.43)
PH BLDCOV: 7.3 [PH] (ref 7.19–7.49)
PLATELET # BLD AUTO: 191 THOUSANDS/UL (ref 149–390)
PMV BLD AUTO: 11.2 FL (ref 8.9–12.7)
PO2 BLDCOA: 10.6 MM HG (ref 5–25)
PO2 BLDCOV: 18.2 MM HG (ref 15–45)
RBC # BLD AUTO: 3.81 MILLION/UL (ref 3.81–5.12)
SAO2 % BLDCOV: 7.5 ML/DL
WBC # BLD AUTO: 23.17 THOUSAND/UL (ref 4.31–10.16)

## 2022-03-12 PROCEDURE — 10H073Z INSERTION OF MONITORING ELECTRODE INTO PRODUCTS OF CONCEPTION, VIA NATURAL OR ARTIFICIAL OPENING: ICD-10-PCS | Performed by: OBSTETRICS & GYNECOLOGY

## 2022-03-12 PROCEDURE — 10D17Z9 MANUAL EXTRACTION OF PRODUCTS OF CONCEPTION, RETAINED, VIA NATURAL OR ARTIFICIAL OPENING: ICD-10-PCS | Performed by: OBSTETRICS & GYNECOLOGY

## 2022-03-12 PROCEDURE — 99024 POSTOP FOLLOW-UP VISIT: CPT | Performed by: OBSTETRICS & GYNECOLOGY

## 2022-03-12 PROCEDURE — 82805 BLOOD GASES W/O2 SATURATION: CPT | Performed by: OBSTETRICS & GYNECOLOGY

## 2022-03-12 PROCEDURE — 0KQM0ZZ REPAIR PERINEUM MUSCLE, OPEN APPROACH: ICD-10-PCS | Performed by: OBSTETRICS & GYNECOLOGY

## 2022-03-12 PROCEDURE — 88307 TISSUE EXAM BY PATHOLOGIST: CPT | Performed by: PATHOLOGY

## 2022-03-12 PROCEDURE — 59400 OBSTETRICAL CARE: CPT | Performed by: OBSTETRICS & GYNECOLOGY

## 2022-03-12 PROCEDURE — 85027 COMPLETE CBC AUTOMATED: CPT | Performed by: STUDENT IN AN ORGANIZED HEALTH CARE EDUCATION/TRAINING PROGRAM

## 2022-03-12 RX ORDER — SIMETHICONE 80 MG
80 TABLET,CHEWABLE ORAL 4 TIMES DAILY PRN
Status: DISCONTINUED | OUTPATIENT
Start: 2022-03-12 | End: 2022-03-14 | Stop reason: HOSPADM

## 2022-03-12 RX ORDER — DIAPER,BRIEF,INFANT-TODD,DISP
1 EACH MISCELLANEOUS DAILY PRN
Status: DISCONTINUED | OUTPATIENT
Start: 2022-03-12 | End: 2022-03-14 | Stop reason: HOSPADM

## 2022-03-12 RX ORDER — METHYLERGONOVINE MALEATE 0.2 MG/ML
INJECTION INTRAVENOUS
Status: COMPLETED
Start: 2022-03-12 | End: 2022-03-12

## 2022-03-12 RX ORDER — FENTANYL CITRATE 50 UG/ML
INJECTION, SOLUTION INTRAMUSCULAR; INTRAVENOUS
Status: COMPLETED
Start: 2022-03-12 | End: 2022-03-12

## 2022-03-12 RX ORDER — BUTORPHANOL TARTRATE 1 MG/ML
1 INJECTION, SOLUTION INTRAMUSCULAR; INTRAVENOUS ONCE
Status: DISCONTINUED | OUTPATIENT
Start: 2022-03-12 | End: 2022-03-12

## 2022-03-12 RX ORDER — CARBOPROST TROMETHAMINE 250 UG/ML
INJECTION, SOLUTION INTRAMUSCULAR
Status: DISPENSED
Start: 2022-03-12 | End: 2022-03-12

## 2022-03-12 RX ORDER — METHYLERGONOVINE MALEATE 0.2 MG/ML
0.2 INJECTION INTRAVENOUS ONCE
Status: COMPLETED | OUTPATIENT
Start: 2022-03-12 | End: 2022-03-12

## 2022-03-12 RX ORDER — CEFAZOLIN SODIUM 2 G/50ML
2000 SOLUTION INTRAVENOUS ONCE
Status: COMPLETED | OUTPATIENT
Start: 2022-03-12 | End: 2022-03-12

## 2022-03-12 RX ORDER — IBUPROFEN 600 MG/1
600 TABLET ORAL EVERY 6 HOURS PRN
Status: DISCONTINUED | OUTPATIENT
Start: 2022-03-12 | End: 2022-03-14 | Stop reason: HOSPADM

## 2022-03-12 RX ORDER — DOCUSATE SODIUM 100 MG/1
100 CAPSULE, LIQUID FILLED ORAL 2 TIMES DAILY
Status: DISCONTINUED | OUTPATIENT
Start: 2022-03-12 | End: 2022-03-14 | Stop reason: HOSPADM

## 2022-03-12 RX ORDER — SENNOSIDES 8.6 MG
1 TABLET ORAL
Status: DISCONTINUED | OUTPATIENT
Start: 2022-03-12 | End: 2022-03-14 | Stop reason: HOSPADM

## 2022-03-12 RX ORDER — ACETAMINOPHEN 325 MG/1
650 TABLET ORAL EVERY 4 HOURS PRN
Status: DISCONTINUED | OUTPATIENT
Start: 2022-03-12 | End: 2022-03-14 | Stop reason: HOSPADM

## 2022-03-12 RX ADMIN — IBUPROFEN 600 MG: 600 TABLET ORAL at 19:26

## 2022-03-12 RX ADMIN — ONDANSETRON 4 MG: 2 INJECTION INTRAMUSCULAR; INTRAVENOUS at 04:31

## 2022-03-12 RX ADMIN — METHYLERGONOVINE MALEATE 0.2 MG: 0.2 INJECTION, SOLUTION INTRAMUSCULAR; INTRAVENOUS at 04:00

## 2022-03-12 RX ADMIN — FENTANYL CITRATE 100 MCG: 50 INJECTION INTRAMUSCULAR; INTRAVENOUS at 00:22

## 2022-03-12 RX ADMIN — CEFAZOLIN SODIUM 2000 MG: 2 SOLUTION INTRAVENOUS at 05:32

## 2022-03-12 RX ADMIN — WITCH HAZEL 1 PAD: 500 SOLUTION RECTAL; TOPICAL at 06:21

## 2022-03-12 RX ADMIN — IBUPROFEN 600 MG: 600 TABLET ORAL at 13:39

## 2022-03-12 RX ADMIN — METHYLERGONOVINE MALEATE 0.2 MG: 0.2 INJECTION, SOLUTION INTRAMUSCULAR; INTRAVENOUS at 04:16

## 2022-03-12 RX ADMIN — METHYLERGONOVINE MALEATE 0.2 MG: 0.2 INJECTION INTRAVENOUS at 04:00

## 2022-03-12 RX ADMIN — ROPIVACAINE HYDROCHLORIDE: 2 INJECTION, SOLUTION EPIDURAL; INFILTRATION at 01:43

## 2022-03-12 RX ADMIN — DOCUSATE SODIUM 100 MG: 100 CAPSULE, LIQUID FILLED ORAL at 08:31

## 2022-03-12 RX ADMIN — IBUPROFEN 600 MG: 600 TABLET ORAL at 07:12

## 2022-03-12 RX ADMIN — ROPIVACAINE HYDROCHLORIDE 4 ML: 5 INJECTION, SOLUTION EPIDURAL; INFILTRATION; PERINEURAL at 00:22

## 2022-03-12 RX ADMIN — DOCUSATE SODIUM 100 MG: 100 CAPSULE, LIQUID FILLED ORAL at 18:17

## 2022-03-12 RX ADMIN — TRANEXAMIC ACID 1000 MG: 1 INJECTION, SOLUTION INTRAVENOUS at 04:15

## 2022-03-12 RX ADMIN — BENZOCAINE AND LEVOMENTHOL 1 APPLICATION: 200; 5 SPRAY TOPICAL at 06:21

## 2022-03-12 RX ADMIN — METHYLERGONOVINE MALEATE 0.2 MG: 0.2 INJECTION INTRAVENOUS at 04:16

## 2022-03-12 NOTE — DISCHARGE INSTRUCTIONS
ÇáæáÇÏÉ ÇáØÈíÚíÉ   ãÇ ÇáÐí íÌÈ Úáíß ãÚÑÝÊå:   MUBKOEG ÇáØÈíÚíÉ åí æáÇÏÉ ÇáØÝá Úä ØÑíÞ ÇáãåÈá (ÞäÇÉ DDOQPSO)  ÈÚÏ ãÛÇÏÑÊß:   ÇáÃÏæíÉ:  · ÇáÃÏæíÉ ÇááÇÓÊíÑæíÏíÉ ÇáãÖÇÏÉ ááÇáÊåÇÈ (NSAID)  ÊÓÇÚÏ Úáì ÊÞáíá ÇáÊæÑã æÇáÃáã Ãæ ÇáÍãì  íÊæÝÑ åÐÇ ÇáÏæÇÁ ÈÃãÑ ãä ÇáØÈíÈ Ãæ Ïæäå  æíãßä Ãä ÊÊÓÈÈ ÃÏæíÉ NSAID Ýí ÍÏæË äÒíÝ ÈÇáãÚÏÉ Ãæ ãÔßáÇÊ ÈÇáßáì ÚäÏ ÈÚÖ ÇáÃÔÎÇÕ  ÅÐÇ ßäÊ OYQCJX ÃÏæíÉ áãäÚ ÊÌáØ ÇáÏã¡ ÝÇÍÑÕ ÏÇÆãðÇ Úáì ÇáÇÓÊÝÓÇÑ ãä ãÞÏã ÇáÑÚÇíÉ ÇáÕÍíÉ ÚãÇ ÅÐÇ ßÇäÊ ÇáÃÏæíÉ ÇááÇÓÊíÑæíÏíÉ ÇáãÖÇÏÉ ááÇáÊåÇÈ ÂãäÉ áß Ãã áÇ  ÇÞÑÃ ÏÇÆãðÇ ÇáãáÕÞ ÇáØÈí æÇÊÈÚ ÇáÊÚáíãÇÊ  · ÊäÇæá ÇáÏæÇÁ æÝÞÇ VNKBJMQEI  ÇÊÕá ÈãÞÏã ÇáÑÚÇíÉ ÇáÕÍíÉ áÏíß ÅÐÇ ßäÊ ÊÚÊÞÏ Ãä ÇáÏæÇÁ ÇáÐí RDGRXWX áÇ íÝíÏß Ãæ ÅÐÇ ßäÊ ÊÚÇäí ãä ÂËÇÑ ÌÇäÈíÉ  ÃÎÈÑå ÅÐÇ ßÇäÊ áÏíß ÍÓÇÓíÉ ÊÌÇå Ãí ÏæÇÁ  ÇÍÊÝÙ ÈÞÇÆãÉ ÈÇáÃÏæíÉ æÇáÝíÊÇãíäÇÊ æÇáÃÚÔÇÈ ÇáÊí ÊÊäÇæáåÇ  ÃÏÑÌ ÝíåÇ ÌÑÚÇÊ ÇáÃÏæíÉ æãæÇÚíÏåÇ æÓÈÈ ÊäÇæáåÇ  ÃÍÖÑ ãÚß åÐå ÇáÞÇÆãÉ Ãæ ÚÈæÇÊ ÇáÃÞÑÇÕ ÚäÏ ÒíÇÑÇÊ ÇáãÊÇÈÚÉ  Lollie Lango ÞÇÆãÉ ÇáÃÏæíÉ ãÚß ÊÍÓÈðÇ áÍÇáÇÊ ÇáØæÇÑÆ  ÊÇÈÚí ãÚ ãæÝÑ ÇáÑÚÇíÉ ÇáÕÍíÉ ÇáÃæáíÉ ÇáÎÇÕ Èßö:  ãÚÙã ÇáäÓÇÁ ÊÍÊÇÌ Åáì ÇáÚæÏÉ ÈÚÏ 6 ÃÓÇÈíÚ ãä ÇáæáÇÏÉ ÇáØÈíÚíÉ  ÇÓÃáí Úä Woody Rounds ÈÇáÌÑæÍ æÇáÛÑÒ  íõÝÖá ÊÏæíä ÇáÃÓÆáÉ ÍÊì áÇ íÊã äÓíÇäåÇ ÃËäÇÁ ÒíÇÑÉ ÇáØÈíÈ  ÇáäÔÇØ:  Costella Ferns ÈÞÏÑ ÇáÅãßÇä  ÍÇæáí ÌÚá ÌãíÚ ÇáÃäÔØÉ ãÎÊÕÑÉ  ÞÏ Êßæäíä ÞÇÏÑÉ Úáì ããÇÑÓÉ ÈÚÖ ÇáÃäÔØÉ ÈÚÏ ÝÊÑÉ ÞÕíÑÉ ãä ÇáæáÇÏÉ  ÊÍÏËí ãÚ ãÞÏã ÇáÑÚÇíÉ ÇáÃæáí ÇáÎÇÕ Èß ÞÈá ÇáÈÏÁ Ýí ããÇÑÓÉ ÇáÊãÑíäÇÊ ÇáÑíÇÖíÉ  æÅÐÇ ßäÊö ÊÚãáíä ÎÇÑÌ TJPPSA¡ ÝÇÓÊÝÓÑí Úä ÇáãæÚÏ ÇáÐí íãßäßö Ýíå ÇáÚæÏÉ Åáì Úãáß  ÊãÇÑíä ßíÌá:  æÞÏ ÊÓÇÚÏ ÊãÇÑíä ßíÌá ÚÖáÇÊ ÇáãåÈá æÇáãÓÊÞíã Úáì ÇáÔÝÇÁ ÈÔßá ÃÓÑÚ  æíãßäßö ããÇÑÓÉ ÊãÇÑíä ßíÌá Úä ØÑíÞ ÔÏ æÅÑÎÇÁ ÇáÚÖáÇÊ ÇáãæÌæÏÉ Íæá ÇáãåÈá  ÊÓÇÚÏ ÊãÇÑíä ßíÌá Úáì ÊÞæíÉ ÇáÚÖáÇÊ  ÑÚÇíÉ ÇáËÏííä:  ÚäÏãÇ íÃÊí ÇááÈä¡ ÞÏ ÊÔÚÑíä ÈÇãÊáÇÁ æÕáÇÈÉ ËÏííßö  ÇÓÃáí Úä ßíÝíÉ ÇáÚäÇíÉ ÈÇáËÏííä¡ ÍÊì ÅÐÇ ßäÊö áÇ ÊÞæãíä ÈÇáÅÑÖÇÚ ÈÕæÑÉ ØÈíÚíÉ  ÇáÅãÓÇß:  áÇ ÊÍÇæá ÏÝÚ ÇáÈÑÇÒ ÅÐÇ ßÇäÊ åäÇß ÕÚæÈÉ ßÈíÑÉ Ýí ÚãáíÉ ÎÑæÌå   íãßä Ãä ÊÓÇÚÏ ÇáÃØÚãÉ ÇáÛäíÉ ÈÇáÃáíÇÝ æÇáÓæÇÆá ÇáßËíÑÉ ÇáÊÏÑíÈÇÊ ÇáÑíÇÖíÉ ÇáãäÊÙãÉ Úáì ÇáæÞÇíÉ ãä ÇáÅãÓÇß  ãä KNLWRXS Úáì ÇáÃØÚãÉ ÇáÛäíÉ DJDXCWCS ÇáÝæÇßå BXMYYQWH  æãä FZMDDGY ÇáãäÇÓÈÉ ÚÕíÑ ÇáÎæÎ æÇáãÇÁ  æÊÓÇÚÏ ÇáÊÏÑíÈÇÊ ÇáÑíÇÖíÉ ÇáãäÊÙãÉ ÇáäÙÇã ÇáåÖãí  æÞÏ íõØáÈ ãäß ÃíÖðÇ ÊäÇæá ÇáÃáíÇÝ ÇáãÊÇÍÉ Ïæä æÕÝÉ ØÈíÉ Ãæ ÃÏæíÉ ãáíäÉ  Gerre [place] YQLWPM åÐå JSPYHBP æÝÞðÇ ááÅÑÔÇÏÇÊ  ÇáÈæÇÓíÑ:  ÞÏ íÊÓÈÈ ÇáÍãá Ýí ÍÏæË ÈæÇÓíÑ ÍÇÏÉ  æÞÏ ÊÚÇäíä ãä Ãáã Ýí ÇáãÓÊÞíã ÈÓÈÈ ÇáÈæÇÓíÑ  ÇÓÃáí Úä ßíÝíÉ ãäÚ ÇáÅÕÇÈÉ ÈÇáÈæÇÓíÑ Ãæ COCMCL ãäåÇ  ÇáÚäÇíÉ ÈãäØÞÉ ÇáÚÌÇä:  ÇáÚÌÇä åí ÇáãäØÞÉ ÇáæÇÞÚÉ Èíä ÇáãåÈá æÇáÔÑÌ  ÍÇÝÙí Úáì äÙÇÝÉ æÌÝÇÝ åÐå ÇáãäØÞÉ ááãÓÇÚÏÉ Úáì ÇáÔÝÇÁ æãäÚ ÇáÅÕÇÈÉ  Þæãí MAWX ÇáãäØÞÉ ÈÑÝÞ ÈÇáãÇÁ ENJGNPOM ÚäÏ DZDEHWMOM Ãæ ÃÎÐ ÇáÏÔ  ÇÔØÝí ãäØÞÉ ÇáÚÌÇä ÈÇáãÇÁ ÇáÏÇÝÆ ÚäÏ ÇÓÊÎÏÇã ÇáãÑÍÇÖ  íãßä Ãä íÞÊÑÍ ãÞÏã ÇáÑÚÇíÉ ÇáÕÍíÉ ÇáÃæáíÉ ÇáÎÇÕ Èß ÇÓÊÎÏÇã ÍãÇã ãÇÆí ÏÇÝÆ áãÓÇÚÏÊß Úáì ÊÞáíá ÇáÃáã ÇáÐí ÊÚÇäí ãäå  ÇáÍãÇã ÇáãÇÆí åæ ÍæÖ AXTSQSV Ãæ ÍæÖ ããÊáÆ Åáì ãÓÊæì ÇáæÑß  ÇãßËí Ýí ÇáÍãÇã ÇáãÇÆí áãÏÉ 20 Åáì 30 ÏÞíÞÉ¡ Ãæ ÍÓÈ ÇáÊÚáíãÇÊ  DRTFSANYO ÇáãåÈáíÉ:  ÓÊßæä áÏíß ÅÝÑÇÒÇÊ ãåÈáíÉ¡ ÊõÏÚì åáÇÈÉ (ÇáÓÇÆá ÇáäÝÇÓí)¡ ÈÚÏ ÇáæáÇÏÉ  RLZSKVUU åí ÚÈÇÑÉ Úä ÓÇÆá ÃÍãÑ ÝÇÊÍ íÎÑÌ ãäßö ÎáÇá Çáíæã ÇáÃæá Ãæ Çáíæãíä ÇáÃæáííä ÈÚÏ ÇáæáÇÏÉ  JSPKWG Çáíæã ÇáÑÇÈÚ¡ ÊÞá ÇáßãíÉ æíÚæÏ áæä ÇáÓÇÆá Åáì ÇáÃÍãÑ ÇáÈäí  ÇÓÊÎÏãí ÝæØÉ ÕÍíÉ ÈÏáÇð ãä ÇáÓÏÇÏÉ ÇáÞØäíÉ áãäÚ ÍÏæË ÇáÚÏæì ÇáãåÈáíÉ  æãä ÇáØÈíÚí æÌæÏ åáÇÈÉ áãÏÉ ÊÕá Åáì ËãÇäíÉ ÃÓÇÈíÚ ÈÚÏ ÇáæáÇÏÉ  ÇáÏæÑÉ ÇáÔåÑíÉ:  ÞÏ ÊÈÏÃ ÇáÏæÑÉ ÇáÔåÑíÉ ãÌÏÏðÇ Ýí ÛÖæä ÝÊÑÉ ÊÊÑÇæÍ ãä 7 Åáì 12 ÃÓÈæÚðÇ ãä ÊÇÑíÎ ÇáæáÇÏÉ  æÅÐÇ ßäÊ ÊÞæãíä ÈÅÑÖÇÚ ÇáØÝá ãä ËÏííß (ÇáÑÖÇÚÉ ÇáØÈíÚíÉ)¡ ÝÞÏ ÊÓÊÛÑÞ ÚæÏÉ ÇáÏæÑÉ æÞÊðÇ ÃØæá  ÈÅãßÇäß ÇáÍãá ãÑÉ ÃÎÑì Úáì ÇáÑÛã ãä ÚÏã æÌæÏ ÇáÏæÑÉ ÇáÔåÑíÉ  Ynes Flack ãÞÏã ÇáÑÚÇíÉ ÇáÕÍíÉ ÇáÃæáíÉ Úä æÓÇÆá ãäÚ VJKNS ÇáÊí ÓÊäÇÓÈß ÅÐÇ ßäÊ áÇ ÊÑÛÈí Ýí ÇáÍãá  ÇáÊÛíÑÇÊ ÇáãÒÇÌíÉ:  ÊÔåÏ ÇáÚÏíÏ ãä ÇáÃãåÇÊ ÇáÌÏÏ äæÚðÇ ãÇ ãä ÇáÊÛíÑÇÊ ÇáãÒÇÌíÉ ÈÚÏ ÇáæáÇÏÉ  æÊÍÏË ÈÚÖ ãä åÐå ÇáÊÛíÑÇÊ ÈÓÈÈ ÞáÉ Çáäæã æÇáÊÛíÑÇÊ ÇáåÑãæäíÉ æÑÚÇíÉ ÇáãæáæÏ ÇáÌÏíÏ  æÈÚÖ ÇáÊÛíÑÇÊ ÇáãÒÇÌíÉ ÞÏ Êßæä ÃßËÑ ÎØæÑÉ¡ ãËá ÇáÇßÊÆÇÈ ÈÚÏ ÇáæáÇÏÉ   ÊÍÏËí Åáì ãÞÏã ÇáÑÚÇíÉ ÇáÕÍíÉ ÇáÃæáíÉ ÇáÎÇÕ Èßö ÅÐÇ ßäÊö ÊÔÚÑíä ÈÚÏã ÇáÞÏÑÉ Úáì ÇáÇÚÊäÇÁ ÈäÝÓß Ãæ ÈØÝáß  ÇáäÔÇØ ÇáÌäÓí:  ÞÏ ÊÍÊÇÌíä Åáì ÊÌäÈ ÇáÌãÇÚ (ããÇÑÓÉ ÇáÌäÓ) áãÏÉ ÊÊÑÇæÍ ãä 6 Åáì 7 ÃÓÇÈíÚ ÈÚÏ ÇáæáÇÏÉ  æÞÏ ÊáÇÍÙíä Ãä ÑÛÈÊß ÞÏ ÞáÊ ááÌãÇÚ Ãæ Ãä ÇáÌãÇÚ ÐÇÊå íßæä ãÄáãðÇ  æÞÏ ÊÍÊÇÌíä Åáì ÇÓÊÎÏÇã ãæÇÏ ÇáÊÔÍíã ÇáãåÈáíÉ (QDYC) ááãÓÇÚÏÉ Úáì ÌÚá ÇáÌãÇÚ ÃßËÑ ÓåæáÉ ÈÇáäÓÈÉ áßö  ÇÊÕá ÈãÞÏã ÇáÑÚÇíÉ ÇáÕÍíÉ ÇáÃæáíÉ ÇáÎÇÕ Èß ÅÐÇ:   · ÊÚÇäíä ãä äÒíÝ ãåÈáí ÍÇÏ íãáÃ ÝæØÉ ÕÍíÉ æÇÍÏÉ Ãæ ÃßËÑ ÎáÇá ÓÇÚÉ  · Ýí FCYB ÇáÅÕÇÈÉ YYWDI ãä ÇáÍãì (ÇÑÊÝÇÚ [id number])  · áã íÒá ÇáÃáã Ãæ ßÇä íÒÏÇÏ ÓæÁðÇ  · ÃÕíÈ ÇáÌáÏ ÇáãæÌæÏ Èíä JLVAWH æÇáãÓÊÞíã JVBZPSWTV Ãæ ÇáÓÎæäÉ Ãæ ÇáÊæÑã  · ÔÚÑÊö GBSYUC Ãæ ÇáÊÕáÈ Ãæ ÇáÊæÑã Ýí ËÏííßö  · ÔÚÑÊö ÈÍÒä Ãæ ÇßÊÆÇÈ ÔÏíÏ  · ÔÚÑÊö ÈÇáÊÚÈ ÃßËÑ ãä ÇáãÚÊÇÏ  · ßÇäÊ áÏíß YEZNHBYLP Ãæ ãÎÇæÝ IMBLWC ÈÍÇáÊß Ãæ ÈÇáÑÚÇíÉ ÇáÊí ÊÊáÞÇåÇ  ÇØáÈ ÇáÑÚÇíÉ ÝæÑðÇ Ãæ ÇÊÕá ÈÇáÑÞã 911 Ýí ÍÇáÉ:   · ßäÊö ÊÚÇäíä ãä ÎÑæÌ ÞíÍ Ãæ ÕÏíÏ ãä VMIFQG Ãæ ÇáÌÑÍ  · ßäÊö ÊÊÈæá ÞáíáÇð ÌÏðÇ¡ Ãæ áÇ ÊÊÈæá Úáì ÇáÅØáÇÞ  · ÔÚÑÊ ÈÓÎæäÉ æÖÚÝ æÃáã Ýí ÐÑÇÚß Ãæ ÓÇÞß  ÞÏ ÊÈÏæ ãÊæÑãÉ æÍãÑÇÁ  · ßäÊ ÊÚÇäí ãä ÇáÏæÇÑ Ãæ Ãáã ãÝÇÌÆ Ýí ÇáÕÏÑ Ãæ ÕÚæÈÉ Ýí ÇáÊäÝÓ  æÞÏ íÒÏÇÏ ÇáÃáã ÚäÏ ÃÎÐ äÝÓ ÚãíÞ Ãæ ÚäÏ BKYUVY¡ Ãæ ÞÏ ÊÕÇÈíä ÈÓÚÇá ãÕÍæÈ ÈÏã  © 2014 [address] is for End User's use only and may not be sold, redistributed or otherwise used for commercial purposes  All illustrations and images included in CareNotes® are the copyrighted property of A [employer] , [employer]  or [employer]  The above information is an  only  It is not intended as medical advice for individual conditions or treatments  Talk to your doctor, nurse or pharmacist before following any medical regimen to see if it is safe and effective for you

## 2022-03-12 NOTE — OB LABOR/OXYTOCIN SAFETY PROGRESS
Oxytocin Safety Progress Check Note - Veronika Yi 32 y o  female MRN: [id number]    Unit/Bed#: L&D 326-01 Encounter: [id number]    Dose (camilla-units/min) Oxytocin: 20 camilla-units/min  Contraction Frequency (minutes): 1 5-4 5  Contraction Quality: Strong  Tachysystole: No   Cervical Dilation: 6-7        Cervical Effacement: 80  Fetal Station: -1  Baseline Rate: 145 bpm  Fetal Heart Rate: 132 BPM  FHR Category: Category I               Vital Signs:   Vitals:    03/11/22 2203   BP: 105/52   Pulse: 88   Resp:    Temp:    SpO2:        Notes/comments:   Category 2 for intermittent variables and periods of minimal variability  Resuscitative efforts include maternal repositioning and IV fluids  Cervical change noted  MVUs range form 160s-200s    Dr Rich Terrazas MD 3/11/2022 10:37 PM

## 2022-03-12 NOTE — L&D DELIVERY NOTE
Vaginal Delivery Summary - OB/GYN   Xochilt Rubio 32 y o  female MRN: [id number]  Unit/Bed#: L&D 326-01 Encounter: [id number]          Predelivery Diagnosis:  1  Pregnancy at 40 weeks  2  Maternal obesity   3  GBS negative    Postdelivery Diagnosis:  1  Same as above  2  Delivery of term   3  Uterine atony with hemorrhage  4  Marginal cord insertion of the placenta    Procedure: Spontaneous Vaginal Delivery, repair of second degree laceration    Attending: Lia Bradford MD    Assistant: Silvia Blackburn MD    Anesthesia: Epidural    QBL: [id number]    Complications: postpartum hemorrhage    Specimens: cord blood, arterial and venous cord blood gasses, placenta to pathology    Findings:   1  Viable male at 36, with APGARS of 2 and 9 at 1 and 5 minutes respectively,  2  Spontaneous delivery of intact placenta at 0355  3  2 degree laceration repaired with 2-0 Vicryl rapide  4  Marginal cord insertion of the placenta  5  Blood gases:   Arterial pH: 7 211   Arterial base excess: -9 8   Venous pH: 7 300   Venous base excess: -5 8    Disposition:  Patient tolerated the procedure well and was recovering in labor and delivery room     Brief history and labor course:  Ms Xochilt Rubio is a 32 y o   at 45wk0d  She presented to labor and delivery for elective induction of labor  Her pregnancy was complicated by obesity  On exam in triage she was noted to be 1 5/50/-2  She was admitted for Lacey balloon and cytotec  She transitioned to Pitocin and was AROM for clear fluid  She slowly progressed in labor until she was complete and started pushing  Description of procedure    After pushing for 57 minutes, at 0352 patient delivered a viable male , wt 7 lb 4 oz, apgars of 2 (1 min) and 9 (5 min)  The fetal vertex delivered spontaneously in an KHALIF presentation  Baby was checked for nuchal cord and found to have none   The right anterior shoulder delivered atraumatically with maternal expulsive forces and the assistance of downward traction  The posterior shoulder delivered with maternal expulsive forces and the assistance of upward traction  The remainder of the fetus delivered spontaneously  Upon delivery, the infant was placed on the mothers abdomen and the cord was clamped and cut  Delayed cord clamping was performed for 30 seconds and then the  team requested the infant for resuscitation at the warmer  The infant was noted to cry after vigorous resuscitation and was moving all extremities appropriately  There was no evidence for injury  Awaiting nurse resuscitators evaluated the   Arterial and venous cord blood gases and cord blood was collected for analysis immediately following delivery  These were promptly sent to the lab  In the immediate post-partum, 30 units of IV pitocin was administered, and the uterus was noted to be atonic  Two doses of IM methergine were administered as well as IV transexamic acid while bimanual massage performed and clots were evacuated  The placenta delivered spontaneously at [phone] and was noted to have a marginally inserted 3 vessel cord  The vagina, cervix, perineum, and rectum were inspected and there was noted to be a second degree laceration with intact perineal skin  Laceration Repair  The patient was comfortable with epidural at that time  A second degree laceration was identified and required repair  Laceration was repaired with 2-0 Vicryl rapide in running locked sutures to reapproximate the laceration  Good hemostasis was confirmed at the conclusion of this procedure  Bilateral labial abrasions were also noted however were hemostatic and did not require sutures  At the conclusion of the procedure, all needle, sponge, and instrument counts were noted to be correct  Patient tolerated the procedure well and was allowed to recover in labor and delivery room with family and  before being transferred to the post-partum floor   The attending, Dr Murtaza Avitia, was present and participated in all key portions of the case  Lilliam Starkey MD  3/12/2022  5:00 AM     I agree with the operative report as dictated by Dr Perri Randolph and edited by me   I was present for and participated in the entire procedure    Julia Stanley MD

## 2022-03-12 NOTE — LACTATION NOTE
This note was copied from a baby's chart  CONSULT - LACTATION  Baby Boy Rafiq Narvaez) Franky 0 days male MRN: [id number]    [facility] Room / Bed: L&D 306(N)/L&D 306(N) Encounter: [id number]    Maternal Information     MOTHER:  Tran Wilkins  Maternal Age: 32 y o    OB History: # 1 - Date: [birth year], Sex: Male, Weight: 3290 g (7 lb 4 1 oz), GA: 40w0d, Delivery: Vaginal, Spontaneous, Apgar1: 2, Apgar5: 9, Living: Living, Birth Comments: None   Previouse breast reduction surgery? No    Lactation history:   Has patient previously breast fed: No   How long had patient previously breast fed:     Previous breast feeding complications:       Past Surgical History:   Procedure Laterality Date    NOSE SURGERY      Nasal Septum deviation repair, lazer    MA REMOVAL OF OVARIAN CYST(S) Right 3/1/2018    Procedure: LAPAROSCOPIC RIGHT SALPINGOCYSTECTOMY;  Surgeon: Karan Cruz MD;  Location: [facility];  Service: Gynecology        Birth information:  YOB: 2022   Time of birth: 3:52 AM   Sex: male   Delivery type: Vaginal, Spontaneous   Birth Weight: 3290 g (7 lb 4 1 oz)   Percent of Weight Change: 0%     Gestational Age: 37w0d   [unfilled]    Assessment     Breast and nipple assessment: Denies need for assistance at this time     Assessment: sleepy    Feeding assessment: feeding well for first day of life    LATCH:  Latch: Repeated attempts, hold nipple in mouth, stimulate to suck   Audible Swallowing: A few with stimulation   Type of Nipple: Flat   Comfort (Breast/Nipple): Soft/non-tender   Hold (Positioning): Partial assist, teach one side, mother does other, staff holds   LATCH Score: 6          Feeding recommendations:  breast feed on demand     Met with mother and father  Provided with Ready, Set, Baby booklet  Discussed Skin to Skin contact an benefits to mom and baby  Talked about the delay of the first bath until baby has adjusted   Spoke about the benefits of rooming in  Feeding on cue and what that means for recognizing infant's hunger  Avoidance of pacifiers for the first month discussed  Talked about exclusive breastfeeding for the first 6 months  Positioning and latch reviewed as well as showing images of other feeding positions  Discussed the properties of a good latch in any position  Reviewed hand/manual expression  Discussed s/s that baby is getting enough milk and some s/s that breastfeeding dyad may need further help  Gave information on common concerns, what to expect the first few weeks after delivery, preparing for other caregivers, and how partners can help  Resources for support also provided  Also reviewed discharge breastfeeding booklet including the feeding log  Emphasized 8 or more (12) feedings in a 24 hour period, what to expect for the number of diapers per day of life and the progression of properties of the  stooling pattern  Reviewed breastfeeding and your lifestyle, storage and preparation of breast milk, how to keep you breast pump clean, the employed breastfeeding mother and paced bottle feeding handouts  Booklet included Breastfeeding Resources for after discharge including access to the number for the [address]  Encouraged parents to call for assistance, questions, and concerns about breastfeeding  Extension provided            Brigitte Villa RN 3/12/2022 9:25 AM

## 2022-03-12 NOTE — DISCHARGE SUMMARY
Discharge Summary - OB/GYN   Alice Sanchez 32 y o  female MRN: [id number]  Unit/Bed#: L&D 326-01 Encounter: [id number]      Admission Date: 3/10/2022     Discharge Date: 3/14/22    Admitting Diagnosis:   1  Pregnancy at 40w0d  2  Obesity    Discharge Diagnosis:   Same, delivered  Postpartum hemorrhage with atony    Procedures: spontaneous vaginal delivery, second degree repair    Attending: Priscilla Moore MD    Hospital Course:     Al[facility] Sanchez [facility] 32 [facility] who was initially admitted for elective induction of labor  She received sawyer and cytotec for ripening and then transitioned to pitocin  She was AROM and made slow progress to complete and started pushing  She delivered a viable male  on 3/12/22 at [phone]  Weight 7lbs 4oz via spontaneous vaginal delivery  Apgars were 2 (1 min) and 9 (5 min)   was transferred to  nursery  Patient tolerated the procedure well and was transferred to recovery in stable condition  Her post-partum course was complicated by PPH of 9 7U after delivery requiring methergine x2 and TXA  Additionally she received Ancef for manual evacuation of blood clots  Her post-partum pain was well controlled with oral analgesics  On day of discharge, she was ambulating and able to reasonably perform all ADLs  She was voiding and had appropriate bowel function  Pain was well controlled  She was discharged home on post-partum day #2 without complications  Patient was instructed to follow up with her OB as an outpatient and was given appropriate warnings to call provider if she develops signs of infection or uncontrolled pain  Complications: none apparent    Condition at discharge: good     Discharge instructions/Information to patient and family:   See after visit summary for information provided to patient and family        Provisions for Follow-Up Care:  See after visit summary for information related to follow-up care and any pertinent home health orders  Disposition: See After Visit Summary for discharge disposition information  Planned Readmission: No    Discharge Medications: For a complete list of the patient's medications, please refer to her med rec      Danna Florez MD

## 2022-03-12 NOTE — OB LABOR/OXYTOCIN SAFETY PROGRESS
Oxytocin Safety Progress Check Note - Priya Phelan 32 y o  female MRN: [id number]    Unit/Bed#: L&D 326-01 Encounter: [id number]    Dose (camilla-units/min) Oxytocin: 10 camilla-units/min  Contraction Frequency (minutes): 2-3  Contraction Quality: Moderate  Tachysystole: No   Cervical Dilation: 10  Dilation Complete Date: 03/12/22  Dilation Complete Time: 0251  Cervical Effacement: 100  Fetal Station: 0  Baseline Rate: 150 bpm  Fetal Heart Rate: 132 BPM  FHR Category: Category I               Vital Signs:   Vitals:    03/12/22 0302   BP: 131/76   Pulse: 98   Resp:    Temp:    SpO2:        Notes/comments:   Pt seen and examined as it appeared she was having lates with each contraction  Upon my arrival to the room, I noticed that the fetal heart rate appeared to rise with each contraction and decrease after each contraction  Maternal pulse correlated and it appears that maternal pulse was being monitored  Readjustment of monitor performed and fetal heart rate 150's as per baseline prior to pushing  FSE placed to confirm correlation and baseline 150's noted   Will continue to monitor closely as it appears that maternal pulse was being traced instead of fetal pulse since Jonas Frankel, MD 3/12/2022 3:41 AM

## 2022-03-12 NOTE — OB LABOR/OXYTOCIN SAFETY PROGRESS
Oxytocin Safety Progress Check Note - Flavia Bose 32 y o  female MRN: [id number]    Unit/Bed#: L&D 326-01 Encounter: [id number]    Dose (camilla-units/min) Oxytocin: 20 camilla-units/min  Contraction Frequency (minutes): 1 5  Contraction Quality: Strong  Tachysystole: No   Cervical Dilation: 5        Cervical Effacement: 80  Fetal Station: -1  Baseline Rate: 140 bpm  Fetal Heart Rate: 132 BPM  FHR Category: Category I               Vital Signs:   Vitals:    03/11/22 2020   BP:    Pulse: (!) 127   Resp:    Temp:    SpO2: 97%       Notes/comments:   Patient has effaced since last check, but no other interval change  Patient repositioned, Category 1 fetal heart tracing  Veda Suh aware    Chandan Gomez MD 3/11/2022 8:26 PM

## 2022-03-12 NOTE — OB LABOR/OXYTOCIN SAFETY PROGRESS
Oxytocin Safety Progress Check Note - Gayathri Saldana 32 y o  female MRN: [id number]    Unit/Bed#: L&D 326-01 Encounter: [id number]    Dose (camilla-units/min) Oxytocin: 20 camilla-units/min  Contraction Frequency (minutes): 2-3  Contraction Quality: Strong  Tachysystole: No   Cervical Dilation: 8-9        Cervical Effacement: 90  Fetal Station: -1  Baseline Rate: 135 bpm  Fetal Heart Rate: 132 BPM  FHR Category: Category I               Vital Signs:   Vitals:    03/12/22 0012   BP: 126/75   Pulse:    Resp:    Temp: 98 5 °F (36 9 °C)   SpO2:        Notes/comments:   Category 1 strip  Cervical change as above  Regular contractions  Dr Shant Foley aware    William Henry MD 3/12/2022 12:43 AM

## 2022-03-12 NOTE — OB LABOR/OXYTOCIN SAFETY PROGRESS
Oxytocin Safety Progress Check Note - Keenan Bath 32 y o  female MRN: [id number]    Unit/Bed#: L&D 326-01 Encounter: [id number]    Dose (camilla-units/min) Oxytocin: 20 camilla-units/min  Contraction Frequency (minutes): 2-3  Contraction Quality: Moderate  Tachysystole: No   Cervical Dilation: 10  Dilation Complete Date: 03/12/22  Dilation Complete Time: 0251  Cervical Effacement: 100  Fetal Station: 0  Baseline Rate: 150 bpm  Fetal Heart Rate: 132 BPM  FHR Category: Category I               Vital Signs:   Vitals:    03/12/22 0233   BP: 122/69   Pulse: 88   Resp:    Temp:    SpO2:        Notes/comments:   Pt reports intermittent rectal pressure every 1-2 minutes  Pt 10/100/0 station    Will begin maternal expulsive efforts        Mar Blake MD 3/12/2022 2:57 AM

## 2022-03-12 NOTE — PLAN OF CARE
Problem: POSTPARTUM  Goal: Experiences normal postpartum course  Description: INTERVENTIONS:  - Monitor maternal vital signs  - Assess uterine involution and lochia  Outcome: Progressing  Goal: Appropriate maternal -  bonding  Description: INTERVENTIONS:  - Identify family support  - Assess for appropriate maternal/infant bonding   -Encourage maternal/infant bonding opportunities  - Referral to  or  as needed  Outcome: Progressing  Goal: Establishment of infant feeding pattern  Description: INTERVENTIONS:  - Assess breast/bottle feeding  - Refer to lactation as needed  Outcome: Progressing  Goal: Incision(s), wounds(s) or drain site(s) healing without S/S of infection  Description: INTERVENTIONS  - Assess and document dressing, incision, wound bed, drain sites and surrounding tissue  - Provide patient and family education  - Perform skin care/dressing changes every  Outcome: Progressing     Problem: Knowledge Deficit  Goal: Verbalizes understanding of labor plan  Description: Assess patient/family/caregiver's baseline knowledge level and ability to understand information  Provide education via patient/family/caregiver's preferred learning method at appropriate level of understanding  1  Provide teaching at level of understanding  2  Provide teaching via preferred learning method(s)  Outcome: Completed     Problem: Labor & Delivery  Goal: Manages discomfort  Description: Assess and monitor for signs and symptoms of discomfort  Assess patient's pain level regularly and per hospital policy  Administer medications as ordered  Support use of nonpharmacological methods to help control pain such as distraction, imagery, relaxation, and application of heat and cold  Collaborate with interdisciplinary team and patient to determine appropriate pain management plan  1  Include patient in decisions related to comfort  2  Offer non-pharmacological pain management interventions    3  Report ineffective pain management to physician  Outcome: Completed  Goal: Patient vital signs are stable  Description: 1  Assess vital signs - vaginal delivery    Outcome: Completed

## 2022-03-13 LAB
ERYTHROCYTE [DISTWIDTH] IN BLOOD BY AUTOMATED COUNT: 15.4 % (ref 11.6–15.1)
HCT VFR BLD AUTO: 24 % (ref 34.8–46.1)
HGB BLD-MCNC: 8.3 G/DL (ref 11.5–15.4)
MCH RBC QN AUTO: 26.8 PG (ref 26.8–34.3)
MCHC RBC AUTO-ENTMCNC: 34.6 G/DL (ref 31.4–37.4)
MCV RBC AUTO: 77 FL (ref 82–98)
PLATELET # BLD AUTO: 161 THOUSANDS/UL (ref 149–390)
PMV BLD AUTO: 11.5 FL (ref 8.9–12.7)
RBC # BLD AUTO: 3.1 MILLION/UL (ref 3.81–5.12)
WBC # BLD AUTO: 15.07 THOUSAND/UL (ref 4.31–10.16)

## 2022-03-13 PROCEDURE — 99024 POSTOP FOLLOW-UP VISIT: CPT | Performed by: OBSTETRICS & GYNECOLOGY

## 2022-03-13 PROCEDURE — 85027 COMPLETE CBC AUTOMATED: CPT | Performed by: STUDENT IN AN ORGANIZED HEALTH CARE EDUCATION/TRAINING PROGRAM

## 2022-03-13 RX ADMIN — IRON SUCROSE 300 MG: 20 INJECTION, SOLUTION INTRAVENOUS at 11:17

## 2022-03-13 RX ADMIN — IBUPROFEN 600 MG: 600 TABLET ORAL at 11:17

## 2022-03-13 RX ADMIN — DOCUSATE SODIUM 100 MG: 100 CAPSULE, LIQUID FILLED ORAL at 18:26

## 2022-03-13 RX ADMIN — IBUPROFEN 600 MG: 600 TABLET ORAL at 18:26

## 2022-03-13 RX ADMIN — DOCUSATE SODIUM 100 MG: 100 CAPSULE, LIQUID FILLED ORAL at 09:36

## 2022-03-13 NOTE — PLAN OF CARE
Problem: POSTPARTUM  Goal: Experiences normal postpartum course  Description: INTERVENTIONS:  - Monitor maternal vital signs  - Assess uterine involution and lochia  Outcome: Progressing  Goal: Appropriate maternal -  bonding  Description: INTERVENTIONS:  - Identify family support  - Assess for appropriate maternal/infant bonding   -Encourage maternal/infant bonding opportunities  - Referral to  or  as needed  Outcome: Progressing  Goal: Establishment of infant feeding pattern  Description: INTERVENTIONS:  - Assess breast/bottle feeding  - Refer to lactation as needed  Outcome: Progressing  Goal: Incision(s), wounds(s) or drain site(s) healing without S/S of infection  Description: INTERVENTIONS  - Assess and document dressing, incision, wound bed, drain sites and surrounding tissue  - Provide patient and family education  - Perform skin care/dressing changes every   Outcome: Progressing

## 2022-03-13 NOTE — UTILIZATION REVIEW
Inpatient Admission Authorization Request   Notification of Maternity/Delivery &  Birth Information for Admission   SERVICING FACILITY:   [address] [place] [address]  Tax ID: [id number]  NPI: [id number]  Place of Service: Inpatient [facility]  [facility]  Place of Service Code: 24     ATTENDING PROVIDER:  Attending Name and NPI#: Francesca Barry [place] [[id number]]  Address: [address] [place] [address]  Phone: [phone]     UTILIZATION REVIEW CONTACT:  Carol Cabrales, Utilization   Network Utilization Review Department  Phone: [phone]  Fax [phone]  Email: [email or web address]     PHYSICIAN ADVISORY SERVICES:  FOR INLH-GA-XWAQ REVIEW - MEDICAL NECESSITY DENIAL  Phone: [phone]  Fax: [phone]  Email: [email or web address]     TYPE OF REQUEST:  Inpatient Status     ADMISSION INFORMATION:  ADMISSION DATE/TIME: 3/10/22  8:57 PM  PATIENT DIAGNOSIS CODE/DESCRIPTION:  Encounter for full-term uncomplicated delivery [W61] There were no encounter diagnoses  No diagnosis found  DISCHARGE DATE/TIME: No discharge date for patient encounter  MOTHER AND  INFORMATION:  Mother: Rich Godwin 1995   Delivering clinician:    OB History        1    Para   1    Term   1       0    AB   0    Living   1       SAB   0    IAB   0    Ectopic   0    Multiple   0    Live Births   1           Obstetric Comments   Menarche: 13    28/5-6/super tampon every 2-4 hours             Name & MRN:   Information for the patient's :  Gerri Poole Jacy Goyal) [[id number]]      Delivery Information:  Sex: male  Delivered 3/12/2022 3:52 AM by Vaginal, Spontaneous; Gestational Age: 37w0d     Measurements:  Weight: 7 lb 4 1 oz (3290 g);   Height: 20"    APGAR 1 minute 5 minutes 10 minutes   Totals: 2 9       Birth Information: 32 y o  female MRN: [id number] Unit/Bed#: L&D 306-01 Estimated Date of Delivery: 3/12/22  Birthweight: No birth weight on file  Gestational Age: <None> Delivery Type: Vaginal, Spontaneous      IMPORTANT INFORMATION:  Please contact the [facility] directly with any questions or concerns regarding this request  Department voicemails are confidential     Send requests for admission clinical reviews, concurrent reviews, approvals, and administrative denials due to lack of clinical to fax [phone]

## 2022-03-13 NOTE — PROGRESS NOTES
Progress Note - OB/GYN   Nathanael Ordza 32 y o  female MRN: [id number]  Unit/Bed#: L&D 306-01 Encounter: [id number]    Assessment:  Post partum Day #1 s/p , stable, baby in room    Plan:  1) PPH   QBL 1 3 L   Hgb 11 4 -> 10 6 -> 8 3   Venofer   S/p methergine IM x2, TXA, 2g Ancef for manual evacuation of clots  2) Immediate pp fever   Afebrile  3) Continue routine post partum care   Encourage ambulation   Encourage breastfeeding   Patient desires dc today     Subjective/Objective   Chief Complaint:     Post delivery  Patient is doing well  Lochia WNL  Pain well controlled  Will reevaluate ability to dc after venofer  Neck soreness after pushing  Subjective:     Pain: yes, cramping, improved with meds  Tolerating PO: yes  Voiding: yes  Flatus: yes  Ambulating: yes  Chest pain: no  Shortness of breath: no  Leg pain: no  Lochia: minimal    Objective:     Vitals: /73 (BP Location: Left arm)   Pulse 103   Temp (!) 97 3 °F (36 3 °C) (Temporal)   Resp 17   Ht 5' 7" (1 702 m)   Wt 98 4 kg (217 lb)   LMP 2021 (Exact Date)   SpO2 97%   Breastfeeding Yes   BMI 33 99 kg/m²       Intake/Output Summary (Last 24 hours) at 3/13/2022 0940  Last data filed at 3/12/2022 1101  Gross per 24 hour   Intake --   Output 300 ml   Net -300 ml       Lab Results   Component Value Date    WBC 15 07 (H) 2022    HGB 8 3 (L) 2022    HCT 24 0 (L) 2022    MCV 77 (L) 2022     2022       Physical Exam:     Gen: AAOx3, NAD  CV: RRR  Lungs: CTA b/l  Abd: Soft, non-tender, non-distended, no rebound or guarding  Uterine fundus firm and non-tender, 2 cm below the umbilicus     Ext: Non tender    Colie Shallow, MD  3/13/2022  9:40 AM

## 2022-03-14 VITALS
TEMPERATURE: 98 F | HEART RATE: 97 BPM | WEIGHT: 217 LBS | BODY MASS INDEX: 34.06 KG/M2 | DIASTOLIC BLOOD PRESSURE: 69 MMHG | OXYGEN SATURATION: 100 % | RESPIRATION RATE: 16 BRPM | HEIGHT: 67 IN | SYSTOLIC BLOOD PRESSURE: 119 MMHG

## 2022-03-14 LAB
DME PARACHUTE DELIVERY DATE ACTUAL: NORMAL
DME PARACHUTE DELIVERY DATE EXPECTED: NORMAL
DME PARACHUTE DELIVERY DATE REQUESTED: NORMAL
DME PARACHUTE DELIVERY NOTE: NORMAL
DME PARACHUTE ITEM DESCRIPTION: NORMAL
DME PARACHUTE ORDER STATUS: NORMAL
DME PARACHUTE SUPPLIER NAME: NORMAL
DME PARACHUTE SUPPLIER PHONE: NORMAL
ERYTHROCYTE [DISTWIDTH] IN BLOOD BY AUTOMATED COUNT: 15.3 % (ref 11.6–15.1)
HCT VFR BLD AUTO: 25.3 % (ref 34.8–46.1)
HGB BLD-MCNC: 8.7 G/DL (ref 11.5–15.4)
MCH RBC QN AUTO: 27.4 PG (ref 26.8–34.3)
MCHC RBC AUTO-ENTMCNC: 34.4 G/DL (ref 31.4–37.4)
MCV RBC AUTO: 80 FL (ref 82–98)
PLATELET # BLD AUTO: 181 THOUSANDS/UL (ref 149–390)
PMV BLD AUTO: 11.4 FL (ref 8.9–12.7)
RBC # BLD AUTO: 3.18 MILLION/UL (ref 3.81–5.12)
WBC # BLD AUTO: 12.5 THOUSAND/UL (ref 4.31–10.16)

## 2022-03-14 PROCEDURE — 99024 POSTOP FOLLOW-UP VISIT: CPT | Performed by: OBSTETRICS & GYNECOLOGY

## 2022-03-14 PROCEDURE — 85027 COMPLETE CBC AUTOMATED: CPT | Performed by: OBSTETRICS & GYNECOLOGY

## 2022-03-14 RX ORDER — DIAPER,BRIEF,INFANT-TODD,DISP
1 EACH MISCELLANEOUS DAILY PRN
Qty: 30 G | Refills: 0
Start: 2022-03-14 | End: 2022-08-05

## 2022-03-14 RX ORDER — IBUPROFEN 600 MG/1
600 TABLET ORAL EVERY 6 HOURS PRN
Qty: 30 TABLET | Refills: 0
Start: 2022-03-14 | End: 2022-08-05

## 2022-03-14 RX ORDER — ACETAMINOPHEN 325 MG/1
650 TABLET ORAL EVERY 4 HOURS PRN
Refills: 0
Start: 2022-03-14 | End: 2022-08-05

## 2022-03-14 RX ORDER — SENNOSIDES 8.6 MG
8.6 TABLET ORAL
Refills: 0
Start: 2022-03-14 | End: 2022-08-05

## 2022-03-14 RX ORDER — SIMETHICONE 80 MG
80 TABLET,CHEWABLE ORAL 4 TIMES DAILY PRN
Qty: 30 TABLET | Refills: 0
Start: 2022-03-14 | End: 2022-08-05

## 2022-03-14 RX ORDER — CALCIUM CARBONATE 200(500)MG
1000 TABLET,CHEWABLE ORAL 3 TIMES DAILY PRN
Refills: 0
Start: 2022-03-14

## 2022-03-14 RX ORDER — DOCUSATE SODIUM 100 MG/1
100 CAPSULE, LIQUID FILLED ORAL 2 TIMES DAILY
Refills: 0
Start: 2022-03-14 | End: 2022-08-05

## 2022-03-14 RX ADMIN — DOCUSATE SODIUM 100 MG: 100 CAPSULE, LIQUID FILLED ORAL at 09:13

## 2022-03-14 RX ADMIN — IBUPROFEN 600 MG: 600 TABLET ORAL at 06:34

## 2022-03-14 NOTE — PROGRESS NOTES
Progress Note - OB/GYN   Rena Henriquez 32 y o  female MRN: [id number]  Unit/Bed#: L&D 306-01 Encounter: [id number]    Assessment:  Post partum Day #2 s/p , stable, baby in room    Plan:  1) PPH   QBL 1 4 L s/p methergine and TXA   Hgbs 11 4 --> 10 2 --> 8 3   S/p venofer  2) PP fever   Tmax of 100 9 on 3/12 at 0445   Afebrile >24h   Manual evacuation of clots, s/p 2g Ancef  3) Continue routine post partum care   Encourage ambulation   Encourage breastfeeding   Anticipate discharge PPD#2     Subjective/Objective   Chief Complaint:     Post delivery  Patient is doing well  Lochia WNL  Pain well controlled  Subjective:     Pain: yes, cramping, improved with meds  Tolerating PO: yes  Voiding: yes  Flatus: yes  Ambulating: yes  Chest pain: no  Shortness of breath: no  Leg pain: no  Lochia: minimal    Objective:     Vitals: /86 (BP Location: Left arm)   Pulse 100   Temp 99 °F (37 2 °C) (Oral)   Resp 18   Ht 5' 7" (1 702 m)   Wt 98 4 kg (217 lb)   LMP 2021 (Exact Date)   SpO2 98%   Breastfeeding Yes   BMI 33 99 kg/m²     I/O        07 0700  07 07    Urine (mL/kg/hr) 500 (0 2)     Total Output 500     Net -500                 Lab Results   Component Value Date    WBC 15 07 (H) 2022    HGB 8 3 (L) 2022    HCT 24 0 (L) 2022    MCV 77 (L) 2022     2022       Physical Exam:     Gen: AAOx3, NAD  CV: RRR  Lungs: CTA b/l  Abd: Soft, non-tender, non-distended, no rebound or guarding  Uterine fundus firm and non-tender, 1 cm below the umbilicus    Ext: Non tender    Maria Luisa Logan MD  3/14/2022  6:26 AM

## 2022-03-14 NOTE — NURSING NOTE
D/C teaching reviewed with pt and   [facility] [employer] magnet reviewed and given  Understanding verbalized and all questions answered at this time

## 2022-03-14 NOTE — PLAN OF CARE
Problem: POSTPARTUM  Goal: Experiences normal postpartum course  Description: INTERVENTIONS:  - Monitor maternal vital signs  - Assess uterine involution and lochia  3/14/2022 1234 by Trang Vega RN  Outcome: Adequate for Discharge  3/14/2022 0944 by Trang Vega RN  Outcome: Progressing  3/14/2022 Jacques Rios by Trang Vega RN  Outcome: Progressing  Goal: Appropriate maternal -  bonding  Description: INTERVENTIONS:  - Identify family support  - Assess for appropriate maternal/infant bonding   -Encourage maternal/infant bonding opportunities  - Referral to  or  as needed  3/14/2022 1234 by Trang Vega RN  Outcome: Adequate for Discharge  3/14/2022 [address] [place] [address] by Trang Vega RN  Outcome: Progressing  3/14/2022 Jacques Urrutia  by Trang Vega RN  Outcome: Progressing  Goal: Establishment of infant feeding pattern  Description: INTERVENTIONS:  - Assess breast/bottle feeding  - Refer to lactation as needed  3/14/2022 1234 by Trang Vega RN  Outcome: Adequate for Discharge  3/14/2022 0944 by Trang Vega RN  Outcome: Progressing  3/14/2022 Jacques Urrutia  by Trang Vega RN  Outcome: Progressing  Goal: Incision(s), wounds(s) or drain site(s) healing without S/S of infection  Description: INTERVENTIONS  - Assess and document dressing, incision, wound bed, drain sites and surrounding tissue  - Provide patient and family education  3/14/2022 1234 by Trang Vega RN  Outcome: Adequate for Discharge  3/14/2022 0944 by Trang Vega RN  Outcome: Progressing  3/14/2022 Jacques Urrutia  by Trang Vega RN  Outcome: Progressing

## 2022-03-14 NOTE — CASE MANAGEMENT
Case Management Discharge Planning Note    Patient name Solomon Quach  Location L&D 306/L&D 355-83 MRN [id number]  : 1995 Date 3/14/2022       Current Admission Date: 3/10/2022  Current Admission Diagnosis: (spontaneous vaginal delivery)   Patient Active Problem List    Diagnosis Date Noted     (spontaneous vaginal delivery) 2022    Postpartum atony of uterus with hemorrhage, delivered 2022    39 weeks gestation of pregnancy 03/10/2022    COVID-19 affecting pregnancy, antepartum 2022    Abnormal glucose tolerance test (GTT) during pregnancy, antepartum 2021    Anemia, antepartum, third trimester 2021    UTI (urinary tract infection) during pregnancy, first trimester 2021    Encounter for supervision of normal first pregnancy in first trimester 2021    Obesity affecting pregnancy in third trimester 2021    Abnormal menstrual periods 2021    Elevated blood sugar 2021    Abnormal ultrasound of uterus 2021    Skin tag 2021    Obesity (BMI 30-39 9) 2021    PCB (post coital bleeding) 2021    Immunization not carried out because of patient refusal 2019    Elevated DHEA ([address] ) 2019    Smoking 2019    Menstrual periods, abnormal 2019    Iron deficiency 2018    Psoriasis 2018    Dietary calcium deficiency 2018    Inadequate exercise 2018    Need for HPV vaccination 2018      LOS (days): 4  Geometric Mean LOS (GMLOS) (days):   Days to GMLOS:     OBJECTIVE:  Risk of Unplanned Readmission Score: 7         Current admission status: Inpatient   Preferred Pharmacy:   [id number] [address]  Phone: [phone] Fax: [phone]    Primary Care Provider: Kalina Hernandez MD    Primary Insurance: BLUE CROSS  Secondary Insurance:     DISCHARGE DETAILS:    MOB requested Ameda pump; approved   To be delivered to patient's home address  CM updated MOB who expressed understanding

## 2022-03-14 NOTE — LACTATION NOTE
This note was copied from a baby's chart  Mom noted nursing baby on right breast using cradle hold  Baby with deep latch but piston suckling, mom denies pain  Verbally reviewed positioning infant up at chest level and starting to feed infant with nose arriving at the nipple  Then, using areolar compression to achieve a deep latch that is comfortable and exchanges optimum amounts of milk  Mom admits to ore nipples  Information given about sore nipples and how to correct with positioning techniques  Discussed maneuvers to latch infant on properly to avoid nipple pain and promote healing  Discussed treatments that could be utilized to promote healing  Hydro gel dressings given with instruction and verbalization of understanding of cleaning and duration of use  Mom has Ready, Set Baby & Discharge Booklets at bedside  No family at bedside at this time  Encoraged MOB  to call for assistance, questions and concerns  Extension number for inpatient lactation support provided

## 2022-03-14 NOTE — UTILIZATION REVIEW
Initial Clinical Review    Admission: Date/Time/Statement:   Admission Orders (From admission, onward)     Ordered        03/10/22 2057  Inpatient Admission  Once                      Orders Placed This Encounter   Procedures    Inpatient Admission     Standing Status:   Standing     Number of Occurrences:   1     Order Specific Question:   Level of Care     Answer:   Med Surg [16]     Order Specific Question:   Estimated length of stay     Answer:   More than 2 Midnights     Order Specific Question:   Certification     Answer:   I certify that inpatient services are medically necessary for this patient for a duration of greater than two midnights  See H&P and MD Progress Notes for additional information about the patient's course of treatment  ED Arrival Information     Patient not seen in ED                     Chief Complaint   Patient presents with   [facility] Scheduled Induction       Initial Presentation: On 3/10/2022 32 y o  Modesta Galarza admit Inpatient for  elective induction  In triage initial exam 1 /-2; eIOL incl sawyer balloon, Cytotec, IV Pit titration, epidural, AROM   On 3/12 0352,  for viable baby Boy  apgars 2 & 9; BW= 7 lb 4 oz, Immediate post partum hemorrhagemanaged  W/ 30U IV Pit due to atonic uterus & Two doses of IM methergine were administered as well as IV transexamic acid   Date: 3/11   Day 2:   2:12 AM  Cervical Dilation: 4 Cervical Effacement: 50  Fetal Station: -2, start IV Pittitration  3/11/2022 8:40 AM epidural   11:35 AM  AROM; Oxytocin: 20 camilla-units/min  Cervical Dilation: 4-5  2:02 PM  Oxytocin: 20 camilla-units/min Cervical Dilation: 5-6  10:37 PM  Oxytocin: 20 camilla-units/min Cervical Dilation: 6-7Category 2 for intermittent variables and periods of minimal variability, Resuscitative efforts include maternal repositioning and IV fluids Cervical change noted    DAY #3  3/12/2022  2:57 AM  10/100/0 station    Will begin maternal expulsive efforts     0352  Procedure: Spontaneous Vaginal Delivery, repair of second degree laceration  Anesthesia: Epidural  QBL: 6104ZR  Complications: postpartum hemorrhage  Specimens: cord blood, arterial and venous cord blood gasses, placenta to pathology  Findings:   1   Viable male at 36, with APGARS of 2 and 9 at 1 and 5 minutes   7 lb 4 oz,   Triage Vitals   Temperature Pulse Respirations Blood Pressure SpO2   03/10/22 2037 03/10/22 2037 03/10/22 2037 03/10/22 2037 03/11/22 2020   98 1 °F (36 7 °C) 93 16 124/72 97 %      Temp Source Heart Rate Source Patient Position - Orthostatic VS BP Location FiO2 (%)   03/10/22 2037 03/10/22 2037 03/10/22 2037 03/10/22 2037 --   Oral Monitor Sitting Left arm       Pain Score       03/10/22 2037       No Pain          Wt Readings from Last 1 Encounters:   03/10/22 98 4 kg (217 lb)     Additional Vital Signs: *  Date/Time Temp Pulse Resp BP MAP (mmHg) SpO2 O2 Device Cardiac (WDL) Patient Position - Orthostatic VS   03/14/22 0707 98 °F (36 7 °C) 97 16 119/69 -- 100 % None (Room air) WDL Lying   03/13/22 2300 99 °F (37 2 °C) 100 18 120/86 -- 98 % None (Room air) WDL Lying   03/13/22 1500 98 4 °F (36 9 °C) 86 18 93/64 -- 100 % None (Room air) WDL Sitting   03/13/22 1111 97 9 °F (36 6 °C) 90 17 116/60 -- 100 % None (Room air) -- Lying   03/13/22 0815 -- -- -- -- -- -- None (Room air) WDL --   03/13/22 0805 97 3 °F (36 3 °C) Abnormal  103 17 134/73 -- 97 % None (Room air)       03/12/22 0012 98 5 °F (36 9 °C) -- -- 126/75 -- -- -- -- --   03/12/22 0003 -- 104 -- -- -- -- -- -- --    Comments:   Temp: Dr Veto Eisenmenger aware at 03/12/22 0445      Weights (last 14 days) before discharge    Date/Time Weight Height   03/10/22 2037 98 4 kg (217 lb) 5' 7" (1 702 m)       03/12/22 0358 100 °F (37 8 °C) -- -- -- -- -- -- -- --   03/12/22 0302 -- 98 -- 131/76 -- -- -- -- --   03/12/22 0259 100 1 °F (37 8 °C) -- -- 124/76 -- -- -- -- --   03/12/22 0233 -- 88 -- 122/69 -- -- -- -- --   03/12/22 0217 -- 100 -- 114/76 -- -- -- -- --   03/12/22 0203 -- 91 -- 115/66 -- -- --             Pertinent Labs/Diagnostic Test Results:   No orders to display         Results from last 7 days   Lab Units 03/14/22  0622 03/13/22  0844 03/12/22  1207 03/10/22  2101 03/10/22  2101   WBC Thousand/uL 12 50* 15 07* 23 17*   < > 8 99   HEMOGLOBIN g/dL 8 7* 8 3* 10 2*  --  11 4*   HEMATOCRIT % 25 3* 24 0* 29 4*  --  33 7*   PLATELETS Thousands/uL 181 161 191   < > 196    < > = values in this interval not displayed  Results from last 7 days   Lab Units 03/10/22  2101   SODIUM mmol/L 138   POTASSIUM mmol/L 3 8   CHLORIDE mmol/L 106   CO2 mmol/L 20*   ANION GAP mmol/L 12   BUN mg/dL 11   CREATININE mg/dL 0 71   EGFR ml/min/1 73sq m 117   CALCIUM mg/dL 8 3     Results from last 7 days   Lab Units 03/10/22  2101   AST U/L 19   ALT U/L 8*   ALK PHOS U/L 139*   TOTAL PROTEIN g/dL 6 4   ALBUMIN g/dL 2 5*   TOTAL BILIRUBIN mg/dL 0 16*         Results from last 7 days   Lab Units 03/10/22  2101   GLUCOSE RANDOM mg/dL 102         Results from last 7 days   Lab Units 03/10/22  2253   CREATININE UR mg/dL 188 2   PROTEIN UR mg/dL 19   PROT/CREAT RATIO UR  0 10     ED Treatment:   Medication Administration - No Administrations Displayed (No Start Event Found)     None        Past Medical History:   Diagnosis Date    Anal itching     since childhood    Chicken pox     Cyst of fallopian tube 2017    Right side, 11 cm, benign    Generalized headaches     Need for HPV vaccination     completed series 2018    Psoriasis      Present on Admission:   Obesity (BMI 30-39  9)   Anemia, antepartum, third trimester      Admitting Diagnosis: Encounter for full-term uncomplicated delivery [R48]  Age/Sex: 32 y o  female  Admission Orders:  Continuous external uterine contraction & fetal HR monitoring      Scheduled Medications:  docusate sodium, 100 mg, Oral, BID  IM  methylergonovine (METHERGINE) injection 0 2 mg 3/12 0400, 0416  Dose: 0 2 mg  Freq:  Once Route: IM    IV tranexamic acid (CYKLOKAPRON) 1,000 mg in  mL IVPB 3/12 0415  Dose: 1,000 mg  Freq: Once Route: IV    Continuous IV Infusions:   IV  oxytocin (PITOCIN) 30 Units in lactated ringers 500 mL infusion 3/11 2130 & DC 3/12 0459  Rate: 1-30 mL/hr Dose: 1-30 camilla-units/min  Freq: Titrated Route: IV      IV  oxytocin (PITOCIN) 30 Units in lactated ringers 500 mL infusion 3/11 0227 & DC 3/12 0459  Rate: 1-30 mL/hr Dose: 1-30 camilla-units/min  Freq: Titrated Route: IV  PRN Meds:  acetaminophen, 650 mg, Oral, Q4H PRN  benzocaine-menthol-lanolin-aloe, 1 application, Topical, T8L PRN  calcium carbonate, 1,000 mg, Oral, TID PRN  hydrocortisone, 1 application, Topical, Daily PRN  ibuprofen, 600 mg, Oral, Q6H PRN  ondansetron, 4 mg, Intravenous, Q6H PRN  senna, 1 tablet, Oral, HS PRN  simethicone, 80 mg, Oral, 4x Daily PRN  witch hazel-glycerin, 1 pad, Topical, Q4H PRN        IP CONSULT TO CASE MANAGEMENT    Denzel Barnes RN  Network Utilization Review Department  ATTENTION: Please call with any questions or concerns to [phone] and carefully listen to the prompts so that you are directed to the right person  All voicemails are confidential   Jackelin Mukherjee all requests for admission clinical reviews, approved or denied determinations and any other requests to dedicated fax number below belonging to the campus where the patient is receiving treatment   List of dedicated fax numbers for the Facilities:  [address] DENIALS (Administrative/Medical Necessity) [phone]   [address] (Maternity/NICU/Pediatrics) [phone]   [address] [place] [address] [place] [address] [place] [address] [address] [place] [address] Rachele Blakely  Yefri  41  [phone]   187 [facility] Sana Goldberg [address] [facility] [phone]

## 2022-03-15 NOTE — UTILIZATION REVIEW
REQUEST FOR MATERNITY AUTHORIZATION  2 DAY LABOR       Inpatient Admission Authorization Request   Notification of Maternity/Delivery &  Birth Information for Admission   SERVICING FACILITY:   [address] [place] [address]  Tax ID: [id number]  NPI: [id number]  Place of Service: Inpatient [facility]  [facility]  Place of Service Code: 24     ATTENDING PROVIDER:  Attending Name and NPI#: Delonte Hdz [[id number]]  Address: [address] [place] [address] [facility]  Phone: [phone]     UTILIZATION REVIEW CONTACT:  Carlos Angel, Utilization   Network Utilization Review Department  Phone: [phone]  Fax [phone]  Email: [email or web address]     PHYSICIAN ADVISORY SERVICES:  FOR OFLO-XW-YIMQ REVIEW - MEDICAL NECESSITY DENIAL  Phone: [phone]  Fax: [phone]  Email: [email or web address]     TYPE OF REQUEST:  Inpatient Status     ADMISSION INFORMATION:  ADMISSION DATE/TIME: 3/10/22  8:57 PM  PATIENT DIAGNOSIS CODE/DESCRIPTION:  Encounter for full-term uncomplicated delivery [R64] The encounter diagnosis was  (spontaneous vaginal delivery)  1   (spontaneous vaginal delivery)      DISCHARGE DATE/TIME: 3/14/2022  1:09 PM   MOTHER AND  INFORMATION:  Mother: Hanane Almanza 1995   Delivering clinician:    OB History        1    Para   1    Term   1       0    AB   0    Living   1       SAB   0    IAB   0    Ectopic   0    Multiple   0    Live Births   1           Obstetric Comments   Menarche: 13    28/5-6/super tampon every 2-4 hours            [place] Name & MRN:   Information for the patient's :  Elmer Stark [[id number]]     [place] Delivery Information:  Sex: male  Delivered 3/12/2022 3:52 AM by Vaginal, Spontaneous; Gestational Age: 37w0d     Measurements:  Weight: 7 lb 4 1 oz (3290 g);   Height: 20"    APGAR 1 minute 5 minutes 10 minutes Totals: 2 9       Birth Information: 32 y o  female MRN: [id number] Unit/Bed#: L&D 306-01 Estimated Date of Delivery: 3/12/22  Birthweight: No birth weight on file  Gestational Age: <None> Delivery Type: Vaginal, Spontaneous      IMPORTANT INFORMATION:  Please contact the [facility] directly with any questions or concerns regarding this request  Department voicemails are confidential     Send requests for admission clinical reviews, concurrent reviews, approvals, and administrative denials due to lack of clinical to fax [phone]

## 2022-03-31 NOTE — UTILIZATION REVIEW
REQUEST FOR 2 DAY LABOR     Initial Clinical Review    Admission: Date/Time/Statement:   Admission Orders (From admission, onward)     Ordered        03/10/22 2057  Inpatient Admission  Once                      Orders Placed This Encounter   Procedures    Inpatient Admission     Standing Status:   Standing     Number of Occurrences:   1     Order Specific Question:   Level of Care     Answer:   Med Surg [16]     Order Specific Question:   Estimated length of stay     Answer:   More than 2 Midnights     Order Specific Question:   Certification     Answer:   I certify that inpatient services are medically necessary for this patient for a duration of greater than two midnights  See H&P and MD Progress Notes for additional information about the patient's course of treatment  ED Arrival Information     Patient not seen in ED                     Chief Complaint   Patient presents with   Karen Noel Scheduled Induction       Initial Presentation: On 3/10/2022 32 y o  Deann Quinn admit Inpatient for  elective induction  In triage initial exam 1 /-2; eIOL incl sawyer balloon, Cytotec, IV Pit titration, epidural, AROM   On 3/12 0352,  for viable baby Boy  apgars 2 & 9; BW= 7 lb 4 oz, Immediate post partum hemorrhagemanaged  W/ 30U IV Pit due to atonic uterus & Two doses of IM methergine were administered as well as IV transexamic acid   Date: 3/11   Day 2:   2:12 AM  Cervical Dilation: 4 Cervical Effacement: 50  Fetal Station: -2, start IV Pittitration  3/11/2022 8:40 AM epidural   11:35 AM  AROM; Oxytocin: 20 camilla-units/min  Cervical Dilation: 4-5  2:02 PM  Oxytocin: 20 camilla-units/min Cervical Dilation: 5-6  10:37 PM  Oxytocin: 20 camilla-units/min Cervical Dilation: 6-7Category 2 for intermittent variables and periods of minimal variability, Resuscitative efforts include maternal repositioning and IV fluids Cervical change noted    DAY #3  3/12/2022  2:57 AM  10/100/0 station    Will begin maternal expulsive efforts 4433  Procedure: Spontaneous Vaginal Delivery, repair of second degree laceration  Anesthesia: Epidural  QBL: [id number]  Complications: postpartum hemorrhage  Specimens: cord blood, arterial and venous cord blood gasses, placenta to pathology  Findings:   1   Viable male at 36, with APGARS of 2 and 9 at 1 and 5 minutes   7 lb 4 oz,   Triage Vitals   Temperature Pulse Respirations Blood Pressure SpO2   03/10/22 2037 03/10/22 2037 03/10/22 2037 03/10/22 2037 03/11/22 2020   98 1 °F (36 7 °C) 93 16 124/72 97 %      Temp Source Heart Rate Source Patient Position - Orthostatic VS BP Location FiO2 (%)   03/10/22 2037 03/10/22 2037 03/10/22 2037 03/10/22 2037 --   Oral Monitor Sitting Left arm       Pain Score       03/10/22 2037       No Pain          Wt Readings from Last 1 Encounters:   03/10/22 98 4 kg (217 lb)     Additional Vital Signs: *  Date/Time Temp Pulse Resp BP MAP (mmHg) SpO2 O2 Device Cardiac (WDL) Patient Position - Orthostatic VS   03/14/22 0707 98 °F (36 7 °C) 97 16 119/69 -- 100 % None (Room air) WDL Lying   03/13/22 2300 99 °F (37 2 °C) 100 18 120/86 -- 98 % None (Room air) WDL Lying   03/13/22 1500 98 4 °F (36 9 °C) 86 18 93/64 -- 100 % None (Room air) WDL Sitting   03/13/22 1111 97 9 °F (36 6 °C) 90 17 116/60 -- 100 % None (Room air) -- Lying   03/13/22 0815 -- -- -- -- -- -- None (Room air) WDL --   03/13/22 0805 97 3 °F (36 3 °C) Abnormal  103 17 134/73 -- 97 % None (Room air)       03/12/22 0012 98 5 °F (36 9 °C) -- -- 126/75 -- -- -- -- --   03/12/22 0003 -- 104 -- -- -- -- -- -- --    Comments:   Temp: Dr Lila Us aware at 03/12/22 0445      Weights (last 14 days) before discharge    Date/Time Weight Height   03/10/22 2037 98 4 kg (217 lb) 5' 7" (1 702 m)       03/12/22 0358 100 °F (37 8 °C) -- -- -- -- -- -- -- --   03/12/22 0302 -- 98 -- 131/76 -- -- -- -- --   03/12/22 0259 100 1 °F (37 8 °C) -- -- 124/76 -- -- -- -- --   03/12/22 0233 -- 88 -- 122/69 -- -- -- -- --   03/12/22 0217 -- 100 -- 114/76 -- -- -- -- --   03/12/22 0203 -- 91 -- 115/66 -- -- --             Pertinent Labs/Diagnostic Test Results:   No orders to display                                         ED Treatment:   Medication Administration - No Administrations Displayed (No Start Event Found)     None        Past Medical History:   Diagnosis Date    Anal itching     since childhood    Chicken pox     Cyst of fallopian tube 2017    Right side, 11 cm, benign    Generalized headaches     Need for HPV vaccination     completed series 2018    Psoriasis      Present on Admission:   Obesity (BMI 30-39  9)   Anemia, antepartum, third trimester      Admitting Diagnosis: Encounter for full-term uncomplicated delivery [K49]  Age/Sex: 32 y o  female  Admission Orders:  Continuous external uterine contraction & fetal HR monitoring      Scheduled Medications:  docusate sodium, 100 mg, Oral, BID  IM  methylergonovine (METHERGINE) injection 0 2 mg 3/12 0400, 0416  Dose: 0 2 mg  Freq: Once Route: IM    IV tranexamic acid (CYKLOKAPRON) 1,000 mg in  mL IVPB 3/12 0415  Dose: 1,000 mg  Freq: Once Route: IV    Continuous IV Infusions:   IV  oxytocin (PITOCIN) 30 Units in lactated ringers 500 mL infusion 3/11 2130 & DC 3/12 0459  Rate: 1-30 mL/hr Dose: 1-30 camilla-units/min  Freq: Titrated Route: IV    No current facility-administered medications for this encounter     IV  oxytocin (PITOCIN) 30 Units in lactated ringers 500 mL infusion 3/11 0227 & DC 3/12 0459  Rate: 1-30 mL/hr Dose: 1-30 camilla-units/min  Freq: Titrated Route: IV  PRN Meds:  acetaminophen, 650 mg, Oral, Q4H PRN  benzocaine-menthol-lanolin-aloe, 1 application, Topical, A5Y PRN  calcium carbonate, 1,000 mg, Oral, TID PRN  hydrocortisone, 1 application, Topical, Daily PRN  ibuprofen, 600 mg, Oral, Q6H PRN  ondansetron, 4 mg, Intravenous, Q6H PRN  senna, 1 tablet, Oral, HS PRN  simethicone, 80 mg, Oral, 4x Daily PRN  witch hazel-glycerin, 1 pad, Topical, Q4H PRN        IP CONSULT TO CASE MANAGEMENT    Pernell Kayser  Network Utilization Review Department  ATTENTION: Please call with any questions or concerns to [phone] and carefully listen to the prompts so that you are directed to the right person  All voicemails are confidential   Williame Pair all requests for admission clinical reviews, approved or denied determinations and any other requests to dedicated fax number below belonging to the campus where the patient is receiving treatment  List of dedicated fax numbers for the Facilities:  [address] DENIALS (Administrative/Medical Necessity) [phone]   [address] (Maternity/NICU/Pediatrics) [phone]   [address] [address] [facility] [address] [place] [address] [phone]       Notification of Discharge   This is a Notification of Discharge from our facility [address]  Please be advised that this patient has been discharge from our facility  Below you will find the admission and discharge date and time including the patients disposition  UTILIZATION REVIEW CONTACT:  Pernell Kayser  Utilization   Network Utilization Review Department  Phone: [phone] x carefully listen to the prompts  All voicemails are confidential   Email: [email or web address]     PHYSICIAN ADVISORY SERVICES:  FOR VARY-IP-SKCR REVIEW - MEDICAL NECESSITY DENIAL  Phone: [phone]  Fax: [phone]  Email: [email or web address]     PRESENTATION DATE: 3/10/2022  8:20 PM  OBERVATION ADMISSION DATE:   INPATIENT ADMISSION DATE: 3/10/22  8:57 PM   DISCHARGE DATE: 3/14/2022  1:09 PM  DISPOSITION: Home/Self Care Home/Self Care      IMPORTANT INFORMATION:  Send all requests for admission clinical reviews, approved or denied determinations and any other requests to dedicated fax number below belonging to the campus where the patient is receiving treatment   List of dedicated fax numbers:  [address] DENIALS (Administrative/Medical Necessity) [phone]   [address] (Maternity/NICU/Pediatrics) [phone]   Matt Barclay [phone]   [address] [place] [address] [phone]   [address] [place] [address] [phone]   [address] [place] [address],[address] [phone]   [facility]  [phone]   [address] [place] [address], [facility]  [address] [phone]

## 2022-04-26 ENCOUNTER — POSTPARTUM VISIT (OUTPATIENT)
Dept: OBGYN CLINIC | Facility: CLINIC | Age: 27
End: 2022-04-26

## 2022-04-26 VITALS
SYSTOLIC BLOOD PRESSURE: 102 MMHG | DIASTOLIC BLOOD PRESSURE: 72 MMHG | HEIGHT: 67 IN | WEIGHT: 190 LBS | BODY MASS INDEX: 29.82 KG/M2

## 2022-04-26 PROCEDURE — 99024 POSTOP FOLLOW-UP VISIT: CPT | Performed by: NURSE PRACTITIONER

## 2022-04-26 NOTE — PROGRESS NOTES
Assessment/Plan:    1  Postpartum exam  Normal PP exam   BC: NFP-- advised adding condoms during time she is lactating  RV 3 mo for YV      Subjective:      Patient ID: Joel Braswell is a 32 y o  female  HPI  POST PARTUM VISIT    33 yo  presents post /repair 2nd degree laceration on 3/12/22  Male, 7 lbs 4 oz; complicated by uterine atony w/ hemorrhage  "Sandeep Avers"    She is feeling well without complaints/  Urination- normal  Bowels- normal  Lactating- w/ formula supplementation  PPD score= 2  BC:  Plans to resume NFP  Explained that breast feeding may change her menstrual cycle patterns and that NFP may be more challenging  Suggested consideration of condoms as well, at least while she is breast feeding  The following portions of the patient's history were reviewed and updated as appropriate: allergies, current medications, past family history, past medical history, past social history, past surgical history and problem list     Review of Systems   Constitutional: Negative for chills and fever  Respiratory: Negative for cough and shortness of breath  Gastrointestinal: Negative  Genitourinary: Negative  Musculoskeletal: Negative for arthralgias and myalgias  Objective:    /72 (BP Location: Right arm, Patient Position: Sitting, Cuff Size: Standard)   Ht 5' 7" (1 702 m)   Wt 86 2 kg (190 lb)   LMP 2022   BMI 29 76 kg/m²      Physical Exam  Constitutional:       General: She is not in acute distress  Appearance: Normal appearance  She is well-developed  She is not ill-appearing or diaphoretic  HENT:      Head: Normocephalic and atraumatic  Eyes:      Pupils: Pupils are equal, round, and reactive to light  Pulmonary:      Effort: Pulmonary effort is normal    Chest:   Breasts:      Right: Normal       Left: Normal        Abdominal:      General: Abdomen is flat  Palpations: Abdomen is soft  Genitourinary:     General: Normal vulva        Exam position: Lithotomy position  Labia:         Right: No rash, tenderness, lesion or injury  Left: No rash, tenderness, lesion or injury  Vagina: No signs of injury and foreign body  No vaginal discharge, erythema, tenderness or bleeding  Cervix: No cervical motion tenderness, discharge or friability  Uterus: Not enlarged and not tender  Adnexa:         Right: No mass or tenderness  Left: No mass or tenderness  Skin:     General: Skin is warm and dry  Neurological:      General: No focal deficit present  Mental Status: She is alert and oriented to person, place, and time  Psychiatric:         Mood and Affect: Mood normal          Behavior: Behavior normal          Thought Content:  Thought content normal          Judgment: Judgment normal

## 2022-08-05 ENCOUNTER — ANNUAL EXAM (OUTPATIENT)
Dept: OBGYN CLINIC | Facility: CLINIC | Age: 27
End: 2022-08-05
Payer: COMMERCIAL

## 2022-08-05 VITALS
SYSTOLIC BLOOD PRESSURE: 112 MMHG | BODY MASS INDEX: 31.42 KG/M2 | DIASTOLIC BLOOD PRESSURE: 76 MMHG | WEIGHT: 200.2 LBS | HEIGHT: 67 IN

## 2022-08-05 DIAGNOSIS — Z72.3 INADEQUATE EXERCISE: ICD-10-CM

## 2022-08-05 DIAGNOSIS — E58 DIETARY CALCIUM DEFICIENCY: ICD-10-CM

## 2022-08-05 DIAGNOSIS — Z01.419 ENCOUNTER FOR ANNUAL ROUTINE GYNECOLOGICAL EXAMINATION: Primary | ICD-10-CM

## 2022-08-05 PROBLEM — N93.0 PCB (POST COITAL BLEEDING): Status: RESOLVED | Noted: 2021-05-18 | Resolved: 2022-08-05

## 2022-08-05 PROBLEM — O98.519 COVID-19 AFFECTING PREGNANCY, ANTEPARTUM: Status: RESOLVED | Noted: 2022-02-01 | Resolved: 2022-08-05

## 2022-08-05 PROBLEM — U07.1 COVID-19 AFFECTING PREGNANCY, ANTEPARTUM: Status: RESOLVED | Noted: 2022-02-01 | Resolved: 2022-08-05

## 2022-08-05 PROCEDURE — S0612 ANNUAL GYNECOLOGICAL EXAMINA: HCPCS | Performed by: OBSTETRICS & GYNECOLOGY

## 2022-08-05 NOTE — PROGRESS NOTES
Pt is a 32 y o  Luis Epp with Patient's last menstrual period was 2022 (approximate)  using NFP for [facility] presents for preventive care  She notes the same partner since her last STI evaluation  In her lifetime she has been involved with 1 partner   Safe sexual practices (monogomy) are followed consistently  · She does  feel safe in the relationship  She does feel safe in her home  · Her calcium intake encompasses cheese and yogurt for a total of 1 servings daily on average  She does not take additional Vitamin D (MVI or supplement)  · She exercises 0 times per week  · Her menses occur every 28 Days, last 5-6 days and require super tampons every 2-4 hours for the first 3 days and then lighter  Menstrual History:  OB History        1    Para   1    Term   1       0    AB   0    Living   1       SAB   0    IAB   0    Ectopic   0    Multiple   0    Live Births   1           Obstetric Comments   Menarche: 15    28/5-6/super tampon every 2-4 hours for first three days              Menarche age: 15  Patient's last menstrual period was 2022 (approximate)  ·      · She has completed the HPV vaccine series appropriate for age    · tobacco use : does use tobacco              · Colonoscopy/ammogram: not indicated  · Pap: 2021-wnl, repeat     Past Medical History:   Diagnosis Date    Anal itching     since childhood    Chicken pox     COVID-19 affecting pregnancy, antepartum 2022    Cyst of fallopian tube 2017    Right side, 11 cm, benign    Generalized headaches     Need for HPV vaccination     completed series 2018    Pap smear for cervical cancer screening     2021-wnl    PCB (post coital bleeding) 2021    Postpartum atony of uterus with hemorrhage, delivered 2022    Psoriasis        Past Surgical History:   Procedure Laterality Date    NOSE SURGERY      Nasal Septum deviation repair, lazer    RI REMOVAL OF OVARIAN CYST(S) Right 3/1/2018 Procedure: LAPAROSCOPIC RIGHT SALPINGOCYSTECTOMY;  Surgeon: Jennifer Soriano MD;  Location: [facility];  Service: Gynecology       OB History    Para Term  AB Living   1 1 1 0 0 1   SAB IAB Ectopic Multiple Live Births   0 0 0 0 1      # Outcome Date GA Lbr Kee/2nd Weight Sex Delivery Anes PTL Lv   1 Term 22 40w0d / 01:01 3290 g (7 lb 4 1 oz) M Vag-Spont EPI N CASI      Obstetric Comments   Menarche: 13      28/5-6/super tampon every 2-4 hours for first three days            Current Outpatient Medications:     calcium carbonate (TUMS) 500 mg chewable tablet, Chew 2 tablets (1,000 mg total) 3 (three) times a day as needed for indigestion or heartburn (Patient not taking: No sig reported), Disp: , Rfl: 0    fluticasone (FLONASE) 50 mcg/act nasal spray, 1 spray into each nostril daily (Patient not taking: No sig reported), Disp: 16 g, Rfl: 0    No Known Allergies    Social History     Socioeconomic History    Marital status: /Civil Union     Spouse name: None    Number of children: 1    Years of education: None    Highest education level: Bachelor's degree (e g , BA, AB, BS)   Occupational History    Occupation:    Tobacco Use    Smoking status: Current Some Day Smoker    Smokeless tobacco: Never Used    Tobacco comment: hookah   Vaping Use    Vaping Use: Never used   Substance and Sexual Activity    Alcohol use: Not Currently     Alcohol/week: 0 0 - 1 0 standard drinks     Comment: occasional    Drug use: No    Sexual activity: Yes     Partners: Male     Birth control/protection: Rhythm     Comment: lifetime partners: 1   Other Topics Concern    None   Social History Narrative    College student - Electrical engineering    [employer]    Hookah pipe smoker    Active job    Accepts blood products        Exercise: active job    Calcium: 1 serving cheese 3-4x  week, yogurt once/week     Social Determinants of Health     Financial Resource Strain: Not on file   Food Insecurity: Not on file   Transportation Needs: Not on file   Physical Activity: Not on file   Stress: Not on file   Social Connections: Not on file   Intimate Partner Violence: Not on file   Housing Stability: Not on file       Family History   Problem Relation Age of Onset    Hyperlipidemia Mother     Stroke Father 36        He was a smoker    Carter's disease Maternal Grandmother     Breast cancer Neg Hx     Ovarian cancer Neg Hx     Colon cancer Neg Hx     Asthma Neg Hx     Cancer Neg Hx     Deep vein thrombosis Neg Hx     Pulmonary embolism Neg Hx     Venous thrombosis Neg Hx     Diabetes Neg Hx     Heart failure Neg Hx     Hypertension Neg Hx     Lung disease Neg Hx     Migraines Neg Hx     Miscarriages / Stillbirths Neg Hx     Seizures Neg Hx     Thyroid disease Neg Hx     Transient ischemic attack Neg Hx        Blood pressure 112/76, height 5' 7" (1 702 m), weight 90 8 kg (200 lb 3 2 oz), last menstrual period 07/22/2022, currently breastfeeding  and Body mass index is 31 36 kg/m²  Physical Exam  Constitutional:       General: She is not in acute distress  Appearance: Normal appearance  She is well-developed  She is obese  She is not ill-appearing  HENT:      Head: Normocephalic and atraumatic  Eyes:      Extraocular Movements: Extraocular movements intact  Conjunctiva/sclera: Conjunctivae normal    Neck:      Thyroid: No thyromegaly  Trachea: No tracheal deviation  Cardiovascular:      Rate and Rhythm: Normal rate and regular rhythm  Heart sounds: Normal heart sounds  Pulmonary:      Effort: Pulmonary effort is normal  No respiratory distress  Breath sounds: Normal breath sounds  No stridor  No wheezing or rales  Abdominal:      General: Bowel sounds are normal  There is no distension  Palpations: Abdomen is soft  There is no mass  Tenderness: There is no abdominal tenderness  There is no guarding or rebound        Hernia: No hernia is present  Musculoskeletal:         General: No tenderness  Normal range of motion  Cervical back: Normal range of motion and neck supple  Lymphadenopathy:      Cervical: No cervical adenopathy  Skin:     General: Skin is warm  Findings: No erythema or rash  Neurological:      Mental Status: She is alert and oriented to person, place, and time  Psychiatric:         Mood and Affect: Mood normal          Behavior: Behavior normal          Thought Content: Thought content normal          Judgment: Judgment normal          Breasts: breasts appear normal, no suspicious masses, no skin or nipple changes or axillary nodes, symmetric fibrous changes in both upper outer quadrants  vulva: normal external genitalia for age and no lesions, masses, epithelial changes, or exudate  vagina: color pink and rugae  well formed rugae  cervix: parous and no lesions   uterus: NSSC, AF, NT, mobile  adnexa: no masses or tenderness      A/P:  Pt is a 32 y o  [place] Yao with      Ronel Beard was seen today for gynecologic exam     Diagnoses and all orders for this visit:    Encounter for annual routine gynecological examination  -stable examination  -pap up to date    Dietary calcium deficiency  Patient advised recommendation of daily dietary calcium of 1000 mg calcium  Inadequate exercise  Patient advised recommendation of exercise 5 times per week for 30 minutes  BMI 31 0-31 9,adult  Patient advised recommendation of BMI to be between 19-25

## 2022-08-11 ENCOUNTER — OFFICE VISIT (OUTPATIENT)
Dept: FAMILY MEDICINE CLINIC | Facility: CLINIC | Age: 27
End: 2022-08-11
Payer: COMMERCIAL

## 2022-08-11 VITALS
OXYGEN SATURATION: 98 % | DIASTOLIC BLOOD PRESSURE: 74 MMHG | BODY MASS INDEX: 31.74 KG/M2 | TEMPERATURE: 97.2 F | SYSTOLIC BLOOD PRESSURE: 129 MMHG | WEIGHT: 202.2 LBS | HEART RATE: 88 BPM | HEIGHT: 67 IN

## 2022-08-11 DIAGNOSIS — D50.0 BLOOD LOSS ANEMIA: ICD-10-CM

## 2022-08-11 DIAGNOSIS — L40.9 PSORIASIS: ICD-10-CM

## 2022-08-11 DIAGNOSIS — H66.91 RIGHT ACUTE OTITIS MEDIA: Primary | ICD-10-CM

## 2022-08-11 PROBLEM — E27.8 OTHER SPECIFIED DISORDERS OF ADRENAL GLAND (HCC): Status: ACTIVE | Noted: 2022-08-11

## 2022-08-11 PROCEDURE — 99214 OFFICE O/P EST MOD 30 MIN: CPT | Performed by: FAMILY MEDICINE

## 2022-08-11 PROCEDURE — 3725F SCREEN DEPRESSION PERFORMED: CPT | Performed by: FAMILY MEDICINE

## 2022-08-11 NOTE — PROGRESS NOTES
Assessment/Plan:       No problem-specific Assessment & Plan notes found for this encounter  Diagnoses and all orders for this visit:    Right acute otitis media  Comments:  Advised patient to call if symptoms not better or worse  Orders:  -     neomycin-polymyxin-hydrocortisone (CORTISPORIN) otic solution; Administer 4 drops to the right ear every 8 (eight) hours    Psoriasis  -     Ambulatory Referral to Dermatology; Future    Blood loss anemia  Comments:  Post delivery  Orders:  -     CBC and differential; Future        Patient Instructions   To follow with  test results      Orders Placed This Encounter   Procedures    CBC and differential     This is a patient instruction: This test is non-fasting  Please drink two glasses of water morning of bloodwork  Standing Status:   Future     Standing Expiration Date:   8/11/2023    Ambulatory Referral to Dermatology     Standing Status:   Future     Standing Expiration Date:   8/11/2023     Referral Priority:   Routine     Referral Type:   Consult - AMB     Referral Reason:   Specialty Services Required     Referred to Provider: Danielle Reaves MD     Requested Specialty:   Dermatology     Number of Visits Requested:   1     Expiration Date:   8/11/2023         Subjective:     Patient ID: Isaias Dent is a 32 y o  female      HPI  C/O right earache     Patient stated  2 weeks  ago she flu to [facility] by the end of the trip she felt pain of the right ear then resolved   5 days ago start feeling pain when she touch her ear lobe, otherwise she has no pain  Denied ear discharge, nasal congestion, sore throat, fever or chills  Denied headaches  Patient with chronic psoriasis request referral to Dermatology because she developing new spots  Post delivery she developed anemia  Her OB placed her on iron supplement    Denied dizziness, or shortness of    Labs 3-14-22 reviewed and discussed result with patient     Review of Systems Constitutional: Negative for appetite change and fatigue  HENT: Positive for ear pain  Negative for tinnitus, trouble swallowing and voice change  Eyes: Negative for photophobia, pain and visual disturbance  Respiratory: Negative for cough, chest tightness and wheezing  Cardiovascular: Negative for chest pain, palpitations and leg swelling  Gastrointestinal: Negative for abdominal distention, abdominal pain, anal bleeding, constipation, diarrhea, nausea and rectal pain  Endocrine: Negative for cold intolerance, heat intolerance, polydipsia and polyuria  Genitourinary: Negative for decreased urine volume, difficulty urinating, dysuria, flank pain, frequency, hematuria and urgency  Musculoskeletal: Negative for arthralgias, back pain, gait problem, myalgias and neck pain  Skin: Negative for pallor and rash  Allergic/Immunologic: Negative for immunocompromised state  Neurological: Negative for dizziness, tremors, seizures, syncope, facial asymmetry, speech difficulty, weakness, light-headedness and headaches  Hematological: Negative for adenopathy  Does not bruise/bleed easily  Psychiatric/Behavioral: Negative for agitation, confusion and hallucinations  The patient is not nervous/anxious  Objective:    BMI Counseling: Body mass index is 31 67 kg/m²  The BMI is above normal  Nutrition recommendations include decreasing portion sizes  Exercise recommendations include moderate physical activity 150 minutes/week  No pharmacotherapy was ordered  Patient referred to PCP  Rationale for BMI follow-up plan is due to patient being overweight or obese  Depression Screening and Follow-up Plan: Patient was screened for depression during today's encounter  They screened negative with a PHQ-2 score of 0  Physical Exam  Constitutional:       Appearance: She is well-developed  She is not ill-appearing  HENT:      Head: Normocephalic        Right Ear: Tympanic membrane and external ear normal  There is no impacted cerumen  Left Ear: Tympanic membrane, ear canal and external ear normal       Ears:      Comments: Mild erythema of the right external     Nose: No rhinorrhea  Mouth/Throat:      Mouth: Mucous membranes are moist       Pharynx: Oropharynx is clear  No oropharyngeal exudate  Eyes:      General: No scleral icterus  Right eye: No discharge  Left eye: No discharge  Extraocular Movements: Extraocular movements intact  Pupils: Pupils are equal, round, and reactive to light  Neck:      Vascular: No carotid bruit  Cardiovascular:      Rate and Rhythm: Regular rhythm  Pulmonary:      Effort: Pulmonary effort is normal       Breath sounds: Normal breath sounds  Abdominal:      General: Bowel sounds are normal       Palpations: Abdomen is soft  There is no mass  Tenderness: There is no abdominal tenderness  There is no guarding  Musculoskeletal:      Cervical back: Neck supple  Left lower leg: No edema  Lymphadenopathy:      Cervical: No cervical adenopathy  Skin:     Coloration: Skin is not jaundiced or pale  Comments: Positive moderate psoriatic patch at the lower scalp  ( new)  Pt stated she still has her chronic recurrent patches at the buttock   Neurological:      Mental Status: She is oriented to person, place, and time  Motor: No weakness  Coordination: Coordination normal    Psychiatric:         Mood and Affect: Mood normal          Thought Content:  Thought content normal          Judgment: Judgment normal

## 2022-08-23 ENCOUNTER — APPOINTMENT (OUTPATIENT)
Dept: LAB | Age: 27
End: 2022-08-23
Payer: COMMERCIAL

## 2022-08-23 DIAGNOSIS — D50.0 BLOOD LOSS ANEMIA: ICD-10-CM

## 2022-08-23 LAB
BASOPHILS # BLD AUTO: 0.04 THOUSANDS/ΜL (ref 0–0.1)
BASOPHILS NFR BLD AUTO: 1 % (ref 0–1)
EOSINOPHIL # BLD AUTO: 0.19 THOUSAND/ΜL (ref 0–0.61)
EOSINOPHIL NFR BLD AUTO: 2 % (ref 0–6)
ERYTHROCYTE [DISTWIDTH] IN BLOOD BY AUTOMATED COUNT: 14.6 % (ref 11.6–15.1)
HCT VFR BLD AUTO: 39.6 % (ref 34.8–46.1)
HGB BLD-MCNC: 12.3 G/DL (ref 11.5–15.4)
IMM GRANULOCYTES # BLD AUTO: 0.03 THOUSAND/UL (ref 0–0.2)
IMM GRANULOCYTES NFR BLD AUTO: 0 % (ref 0–2)
LYMPHOCYTES # BLD AUTO: 2.78 THOUSANDS/ΜL (ref 0.6–4.47)
LYMPHOCYTES NFR BLD AUTO: 34 % (ref 14–44)
MCH RBC QN AUTO: 24.4 PG (ref 26.8–34.3)
MCHC RBC AUTO-ENTMCNC: 31.1 G/DL (ref 31.4–37.4)
MCV RBC AUTO: 78 FL (ref 82–98)
MONOCYTES # BLD AUTO: 0.74 THOUSAND/ΜL (ref 0.17–1.22)
MONOCYTES NFR BLD AUTO: 9 % (ref 4–12)
NEUTROPHILS # BLD AUTO: 4.48 THOUSANDS/ΜL (ref 1.85–7.62)
NEUTS SEG NFR BLD AUTO: 54 % (ref 43–75)
NRBC BLD AUTO-RTO: 0 /100 WBCS
PLATELET # BLD AUTO: 289 THOUSANDS/UL (ref 149–390)
PMV BLD AUTO: 10.9 FL (ref 8.9–12.7)
RBC # BLD AUTO: 5.05 MILLION/UL (ref 3.81–5.12)
WBC # BLD AUTO: 8.26 THOUSAND/UL (ref 4.31–10.16)

## 2022-08-23 PROCEDURE — 36415 COLL VENOUS BLD VENIPUNCTURE: CPT

## 2022-08-23 PROCEDURE — 85025 COMPLETE CBC W/AUTO DIFF WBC: CPT

## 2022-08-24 ENCOUNTER — OFFICE VISIT (OUTPATIENT)
Dept: FAMILY MEDICINE CLINIC | Facility: CLINIC | Age: 27
End: 2022-08-24
Payer: COMMERCIAL

## 2022-08-24 VITALS
TEMPERATURE: 97.2 F | DIASTOLIC BLOOD PRESSURE: 78 MMHG | SYSTOLIC BLOOD PRESSURE: 110 MMHG | OXYGEN SATURATION: 98 % | HEART RATE: 84 BPM | HEIGHT: 67 IN | BODY MASS INDEX: 31.42 KG/M2 | WEIGHT: 200.2 LBS

## 2022-08-24 DIAGNOSIS — M54.9 CHRONIC UPPER BACK PAIN: Primary | ICD-10-CM

## 2022-08-24 DIAGNOSIS — G89.29 CHRONIC UPPER BACK PAIN: Primary | ICD-10-CM

## 2022-08-24 DIAGNOSIS — R71.8 MICROCYTOSIS: ICD-10-CM

## 2022-08-24 DIAGNOSIS — D50.0 BLOOD LOSS ANEMIA: ICD-10-CM

## 2022-08-24 LAB
BACTERIA UR QL AUTO: ABNORMAL /HPF
BILIRUB UR QL STRIP: NEGATIVE
CLARITY UR: CLEAR
COLOR UR: ABNORMAL
GLUCOSE UR STRIP-MCNC: NEGATIVE MG/DL
HGB UR QL STRIP.AUTO: NEGATIVE
KETONES UR STRIP-MCNC: NEGATIVE MG/DL
LEUKOCYTE ESTERASE UR QL STRIP: ABNORMAL
NITRITE UR QL STRIP: NEGATIVE
NON-SQ EPI CELLS URNS QL MICRO: ABNORMAL /HPF
PH UR STRIP.AUTO: 6 [PH]
PROT UR STRIP-MCNC: NEGATIVE MG/DL
RBC #/AREA URNS AUTO: ABNORMAL /HPF
SL AMB  POCT GLUCOSE, UA: ABNORMAL
SL AMB LEUKOCYTE ESTERASE,UA: ABNORMAL
SL AMB POCT BILIRUBIN,UA: ABNORMAL
SL AMB POCT BLOOD,UA: ABNORMAL
SL AMB POCT CLARITY,UA: ABNORMAL
SL AMB POCT COLOR,UA: YELLOW
SL AMB POCT KETONES,UA: ABNORMAL
SL AMB POCT NITRITE,UA: ABNORMAL
SL AMB POCT PH,UA: ABNORMAL
SL AMB POCT SPECIFIC GRAVITY,UA: 1025
SL AMB POCT URINE HCG: NEGATIVE
SL AMB POCT URINE PROTEIN: ABNORMAL
SL AMB POCT UROBILINOGEN: ABNORMAL
SP GR UR STRIP.AUTO: 1.02 (ref 1–1.03)
UROBILINOGEN UR STRIP-ACNC: <2 MG/DL
WBC #/AREA URNS AUTO: ABNORMAL /HPF

## 2022-08-24 PROCEDURE — 81025 URINE PREGNANCY TEST: CPT | Performed by: FAMILY MEDICINE

## 2022-08-24 PROCEDURE — 81002 URINALYSIS NONAUTO W/O SCOPE: CPT | Performed by: FAMILY MEDICINE

## 2022-08-24 PROCEDURE — 87086 URINE CULTURE/COLONY COUNT: CPT | Performed by: FAMILY MEDICINE

## 2022-08-24 PROCEDURE — 99214 OFFICE O/P EST MOD 30 MIN: CPT | Performed by: FAMILY MEDICINE

## 2022-08-24 PROCEDURE — 81001 URINALYSIS AUTO W/SCOPE: CPT | Performed by: FAMILY MEDICINE

## 2022-08-24 RX ORDER — NAPROXEN 500 MG/1
500 TABLET ORAL 2 TIMES DAILY WITH MEALS
Qty: 20 TABLET | Refills: 0 | Status: SHIPPED | OUTPATIENT
Start: 2022-08-24

## 2022-08-24 NOTE — PROGRESS NOTES
Assessment/Plan:       No problem-specific Assessment & Plan notes found for this encounter  Diagnoses and all orders for this visit:    Chronic upper back pain  Comments:  Does have shoulder dyskinesia  Recommend physical therapy after x-rays done  Take Naprosyn with food  GI side effect discussed  Orders:  -     POCT urine dip  -     Cancel: POCT ECG  -     Urinalysis with microscopic  -     Cancel: Urine culture; Future  -     XR shoulder 2+ vw right; Future  -     XR scapula right; Future  -     naproxen (Naprosyn) 500 mg tablet; Take 1 tablet (500 mg total) by mouth 2 (two) times a day with meals  -     Urine culture; Future  -     Urine culture  -     POCT urine HCG    Blood loss anemia  Comments:  corrected    Microcytosis  Comments:   Stable  In the past also patient had hemoglobin electrophoresis  And iron study        Patient Instructions    Follow up with results      Orders Placed This Encounter   Procedures    Urine culture     Standing Status:   Future     Number of Occurrences:   1     Standing Expiration Date:   8/24/2023    XR shoulder 2+ vw right     Standing Status:   Future     Standing Expiration Date:   8/24/2026     Scheduling Instructions:      Bring along any outside films relating to this procedure  Order Specific Question:   Is the patient pregnant? Answer:   Unknown    XR scapula right     Standing Status:   Future     Standing Expiration Date:   8/24/2026     Scheduling Instructions:      Bring along any outside films relating to this procedure  Order Specific Question:   Is the patient pregnant? Answer:   Unknown    Urinalysis with microscopic    POCT urine dip    POCT urine HCG         Subjective:     Patient ID: Karene Leventhal is a 32 y o  female      HPI  Chronic back pain  Patient stated as long as she remember since she was and her teen she does have pain located between upper spine and right scapula    It feels like very tight specially with movement  She used to have some massage and goes away  But recently started 2 weeks ago and it is not going away she feels it mostly with back movement and when she tried to reach things with her arm  She tried massage did not have much  Patient denied any injury  She said long time working on her computer and carry her small child  Denied weakness or numbness of the upper extremities  patient denied neck pain, chest pain, cough shortness of breath or hemoptysis  Urine dip done at this office visit because the medical assistant stated the patient told her she had mid back pain  Since her urine dip is abnormal the urine was sent for UA and culture denied low back pain  Denied any problem with urination       Not nursing    Test results  Labs done yesterday  Result with pt    Review of Systems   Constitutional: Negative for activity change, appetite change, chills, fatigue, fever and unexpected weight change  HENT: Negative for congestion, ear discharge, ear pain, hearing loss, nosebleeds, rhinorrhea, sinus pressure, sore throat, tinnitus, trouble swallowing and voice change  Eyes: Negative for photophobia, pain and visual disturbance  Respiratory: Negative for cough, chest tightness, shortness of breath and wheezing  Cardiovascular: Negative for chest pain, palpitations and leg swelling  Gastrointestinal: Negative for abdominal pain, anal bleeding, blood in stool, constipation, diarrhea, nausea and vomiting  Endocrine: Negative for cold intolerance, heat intolerance, polydipsia and polyuria  Genitourinary: Negative for dysuria, frequency, hematuria and urgency  Musculoskeletal: Positive for back pain  Negative for arthralgias, gait problem, joint swelling, myalgias and neck pain  Skin: Negative for rash  Neurological: Negative for dizziness, tremors, seizures, syncope, weakness, light-headedness and headaches  Hematological: Negative for adenopathy  Does not bruise/bleed easily  Psychiatric/Behavioral: Negative for agitation, behavioral problems, confusion, dysphoric mood, hallucinations and sleep disturbance  The patient is not nervous/anxious  Objective:     Physical Exam  Constitutional:       Appearance: Normal appearance  She is well-developed  She is not ill-appearing or diaphoretic  HENT:      Head: Normocephalic  Eyes:      General: No scleral icterus  Pupils: Pupils are equal, round, and reactive to light  Neck:      Vascular: No carotid bruit  Cardiovascular:      Rate and Rhythm: Regular rhythm  Pulmonary:      Effort: Pulmonary effort is normal       Breath sounds: Normal breath sounds  Abdominal:      General: Bowel sounds are normal       Palpations: There is no mass  Tenderness: There is no abdominal tenderness  Musculoskeletal:      Right lower leg: No edema  Left lower leg: No edema  Comments: Back  There is some asymmetry of the scapula the spine on the right side is more prominent  There is no muscle or spine or bone tenderness  Left shoulder with full range of motion no swelling  No tenderness no redness  Both arm with normal strength and sensation  Has for range of motion of the back   Lymphadenopathy:      Cervical: No cervical adenopathy  Skin:     Findings: No rash  Neurological:      General: No focal deficit present  Cranial Nerves: No cranial nerve deficit  Sensory: No sensory deficit  Coordination: Coordination normal       Gait: Gait normal    Psychiatric:         Thought Content:  Thought content normal          Judgment: Judgment normal

## 2022-08-26 LAB — BACTERIA UR CULT: NORMAL

## 2022-08-30 ENCOUNTER — TELEPHONE (OUTPATIENT)
Dept: FAMILY MEDICINE CLINIC | Facility: CLINIC | Age: 27
End: 2022-08-30

## 2022-08-30 DIAGNOSIS — R82.90 ABNORMAL URINALYSIS: Primary | ICD-10-CM

## 2022-08-30 NOTE — TELEPHONE ENCOUNTER
----- Message from Aniket Mcintyre MD sent at 8/29/2022  2:37 PM EDT -----  Urine culture is okay per in urinalysis positive    Advised to recheck urinalysis ,applying clean catch midstream urine specimen

## 2022-09-01 NOTE — PLAN OF CARE
Date of Service: 9/1/2022    CHIEF COMPLAINT:  Follow up for abnormal CT chest, test results.    HISTORY OF PRESENT ILLNESS:  Gisell Booth is a 80 year old female who was last seen on 12/22/2021.    CT chest perform in December 2021 demonstrated abnormalities for which she and she underwent a bronchoscopy with BAL on 12/15/2021.  Mucus plugging was noted throughout.  Cultures were positive for Staph aureus and H. influenza and she was treated with a course of doxycycline.    Previously was noted to have a lung mass on a CT chest in March 2020. She thereafter underwent a PET scan in March 2020 which showed significant uptake. She underwent a navigational bronchoscopy on 4/1/2020. Mucus plugging was noted and was aspirated. Cytology was negative for malignancy. Following the procedure, she was treated for pneumonia with a course of Augmentin and doxycycline.    Currently denies any significant shortness of breath or wheezing. Has an occasional cough productive. Denies any recent fever, chills, weight loss, or loss of appetite. She previously worked in a [employer] plant. No known mold in her home. She has a pet dog. No pet birds or reptiles. No recent travel.    She smoked 0.5 ppd for 3 years as a teenager. Quit in 1963.    PHYSICAL EXAM:  Visit Vitals  Pulse 78   Resp 16   Ht 5' 3.75\" (1.619 m)   Wt 75.9 kg (167 lb 7 oz)   LMP 01/01/1998 (Approximate)   SpO2 95% Comment: at rest RA   BMI 28.97 kg/m²   GENERAL: Moderately built, in no acute distress.  Detailed physical exam deferred due to the COVID-19 pandemic.    REVIEW OF DATA:  Nurse note reviewed and accepted.    Bronchoscopy: 12/15/2021  Lung cytology, bronchial wash:  - Negative for malignant cells, viral cytopathic effect, or pathologic fungus.  2.   Lung cytology, left lower lobe and right upper lobe, bronchoalveolar lavage:  - Negative for malignant cells, viral cytopathic effect, or pathologic fungus.  Legionella pneumophila NAAT:Not Detected  Problem: POSTPARTUM  Goal: Experiences normal postpartum course  Description: INTERVENTIONS:  - Monitor maternal vital signs  - Assess uterine involution and lochia  Outcome: Progressing  Goal: Appropriate maternal -  bonding  Description: INTERVENTIONS:  - Identify family support  - Assess for appropriate maternal/infant bonding   -Encourage maternal/infant bonding opportunities  - Referral to  or  as needed  Outcome: Progressing  Goal: Establishment of infant feeding pattern  Description: INTERVENTIONS:  - Assess breast/bottle feeding  - Refer to lactation as needed  Outcome: Progressing  Goal: Incision(s), wounds(s) or drain site(s) healing without S/S of infection  Description: INTERVENTIONS  - Assess and document dressing, incision, wound bed, drain sites and surrounding tissue  - Provide patient and family education  - Perform skin care/dressing changes every     Outcome: Progressing     Problem: Potential for Falls  Goal: Patient will remain free of falls  Description: INTERVENTIONS:  - Educate patient/family on patient safety including physical limitations  - Instruct patient to call for assistance with activity   - Consult OT/PT to assist with strengthening/mobility   - Keep Call bell within reach  - Keep bed low and locked with side rails adjusted as appropriate  - Keep care items and personal belongings within reach  - Initiate and maintain comfort rounds  - Make Fall Risk Sign visible to staff  - Offer Toileting every    Hours, in advance of need  - Initiate/Maintain   alarm  - Obtain necessary fall risk management equipment:         - Apply yellow socks and bracelet for high fall risk patients  - Consider moving patient to room near nurses station  Outcome: Progressing   Farnaz micdadei NAAT:Not Detected   Mycoplasma pneumoniae NAAT: Not Detected  Chlamydia pneumoniae NAAT: Not Detected  Respiratory Culture: >100,000 CFU/mL Staphylococcus aureus Abnormal  80,000 CFU/mL Haemophilus influenzae Abnormal Beta lactamase negative  <10,0000 CFU/mL Normal Joselyn  Gram Stain:3+ PMNs Abnormal  4+ Gram Positive Cocci Abnormal    AFB cultures (Final results pending):  No AFB seen.   Fungal cultures (Final results pending): 1+ candida    CT chest:  06/23/2022. Films reviewed and compared to priors.  1. Scattered basically stable pulmonary nodules within both lungs as  described above. Previously identified areas of infiltrate peripherally in  the left lower lobe and in the inferior lateral aspect the right upper lobe  have resolved with mild remaining scarring at the previous site of the bulb  probable infection.  2. Scattered interstitial fibrotic changes and bronchiectasis with  peribronchial fibrotic changes. These appear stable.  3. No pleural effusion or pneumothorax. No pathologic adenopathy in  Mediastinum.    ASSESSMENT AND PLAN:  1.  Pulmonary infiltrates:  Improvement noted on it recent CT chest.  Likely responded to antibiotics after last bronchoscopy.  Underlying bronchiectasis may be a causative factor for recurrent infections.    2.  Pulmonary nodules:  Pulmonary nodules are essentially stable.  Likelihood of primary pulmonary malignancy would be low given the remote history of smoking.  Continued monitoring with CT chest in 1 year.    3.  Bronchiectasis/Mucus plugging: Symptomatic treatment with mucolytics as needed.    Return in about 10 months (around 6/26/2023) for Lung disease, with CT chest.      CC: Luis Finch MD

## 2023-02-12 ENCOUNTER — APPOINTMENT (OUTPATIENT)
Dept: LAB | Age: 28
End: 2023-02-12

## 2023-02-12 ENCOUNTER — APPOINTMENT (OUTPATIENT)
Dept: RADIOLOGY | Age: 28
End: 2023-02-12

## 2023-02-12 DIAGNOSIS — G89.29 CHRONIC UPPER BACK PAIN: ICD-10-CM

## 2023-02-12 DIAGNOSIS — Z79.899 ENCOUNTER FOR LONG-TERM (CURRENT) USE OF OTHER MEDICATIONS: ICD-10-CM

## 2023-02-12 DIAGNOSIS — M54.9 CHRONIC UPPER BACK PAIN: ICD-10-CM

## 2023-02-12 LAB
ALBUMIN SERPL BCP-MCNC: 3.6 G/DL (ref 3.5–5)
ALP SERPL-CCNC: 55 U/L (ref 46–116)
ALT SERPL W P-5'-P-CCNC: 23 U/L (ref 12–78)
ANION GAP SERPL CALCULATED.3IONS-SCNC: 3 MMOL/L (ref 4–13)
AST SERPL W P-5'-P-CCNC: 14 U/L (ref 5–45)
BACTERIA UR QL AUTO: ABNORMAL /HPF
BASOPHILS # BLD AUTO: 0.03 THOUSANDS/ÂΜL (ref 0–0.1)
BASOPHILS NFR BLD AUTO: 0 % (ref 0–1)
BILIRUB SERPL-MCNC: 0.43 MG/DL (ref 0.2–1)
BILIRUB UR QL STRIP: NEGATIVE
BUN SERPL-MCNC: 18 MG/DL (ref 5–25)
CALCIUM SERPL-MCNC: 8.8 MG/DL (ref 8.3–10.1)
CHLORIDE SERPL-SCNC: 106 MMOL/L (ref 96–108)
CLARITY UR: ABNORMAL
CO2 SERPL-SCNC: 27 MMOL/L (ref 21–32)
COLOR UR: YELLOW
CREAT SERPL-MCNC: 0.66 MG/DL (ref 0.6–1.3)
EOSINOPHIL # BLD AUTO: 0.15 THOUSAND/ÂΜL (ref 0–0.61)
EOSINOPHIL NFR BLD AUTO: 1 % (ref 0–6)
ERYTHROCYTE [DISTWIDTH] IN BLOOD BY AUTOMATED COUNT: 14.6 % (ref 11.6–15.1)
GFR SERPL CREATININE-BSD FRML MDRD: 121 ML/MIN/1.73SQ M
GLUCOSE P FAST SERPL-MCNC: 94 MG/DL (ref 65–99)
GLUCOSE UR STRIP-MCNC: NEGATIVE MG/DL
HCT VFR BLD AUTO: 43.3 % (ref 34.8–46.1)
HGB BLD-MCNC: 13.3 G/DL (ref 11.5–15.4)
HGB UR QL STRIP.AUTO: NEGATIVE
IMM GRANULOCYTES # BLD AUTO: 0.05 THOUSAND/UL (ref 0–0.2)
IMM GRANULOCYTES NFR BLD AUTO: 1 % (ref 0–2)
KETONES UR STRIP-MCNC: NEGATIVE MG/DL
LEUKOCYTE ESTERASE UR QL STRIP: ABNORMAL
LYMPHOCYTES # BLD AUTO: 2.51 THOUSANDS/ÂΜL (ref 0.6–4.47)
LYMPHOCYTES NFR BLD AUTO: 23 % (ref 14–44)
MCH RBC QN AUTO: 24.3 PG (ref 26.8–34.3)
MCHC RBC AUTO-ENTMCNC: 30.7 G/DL (ref 31.4–37.4)
MCV RBC AUTO: 79 FL (ref 82–98)
MONOCYTES # BLD AUTO: 0.82 THOUSAND/ÂΜL (ref 0.17–1.22)
MONOCYTES NFR BLD AUTO: 8 % (ref 4–12)
MUCOUS THREADS UR QL AUTO: ABNORMAL
NEUTROPHILS # BLD AUTO: 7.33 THOUSANDS/ÂΜL (ref 1.85–7.62)
NEUTS SEG NFR BLD AUTO: 67 % (ref 43–75)
NITRITE UR QL STRIP: NEGATIVE
NON-SQ EPI CELLS URNS QL MICRO: ABNORMAL /HPF
NRBC BLD AUTO-RTO: 0 /100 WBCS
PH UR STRIP.AUTO: 6 [PH]
PLATELET # BLD AUTO: 326 THOUSANDS/UL (ref 149–390)
PMV BLD AUTO: 10.7 FL (ref 8.9–12.7)
POTASSIUM SERPL-SCNC: 4.1 MMOL/L (ref 3.5–5.3)
PROT SERPL-MCNC: 7.4 G/DL (ref 6.4–8.4)
PROT UR STRIP-MCNC: ABNORMAL MG/DL
RBC # BLD AUTO: 5.48 MILLION/UL (ref 3.81–5.12)
RBC #/AREA URNS AUTO: ABNORMAL /HPF
SODIUM SERPL-SCNC: 136 MMOL/L (ref 135–147)
SP GR UR STRIP.AUTO: 1.03 (ref 1–1.03)
UROBILINOGEN UR STRIP-ACNC: <2 MG/DL
WBC # BLD AUTO: 10.89 THOUSAND/UL (ref 4.31–10.16)
WBC #/AREA URNS AUTO: ABNORMAL /HPF

## 2023-02-14 LAB
GAMMA INTERFERON BACKGROUND BLD IA-ACNC: 0.03 IU/ML
M TB IFN-G BLD-IMP: NEGATIVE
M TB IFN-G CD4+ BCKGRND COR BLD-ACNC: -0.01 IU/ML
M TB IFN-G CD4+ BCKGRND COR BLD-ACNC: -0.01 IU/ML
MITOGEN IGNF BCKGRD COR BLD-ACNC: 4.85 IU/ML

## 2023-02-15 ENCOUNTER — TELEPHONE (OUTPATIENT)
Dept: FAMILY MEDICINE CLINIC | Facility: CLINIC | Age: 28
End: 2023-02-15

## 2023-02-15 NOTE — TELEPHONE ENCOUNTER
I spoke to patient in regards to if she is staying here with us as a patient or follow Dr Nia Zambrano   She said that she is going to search for Dr Nia Zambrano

## 2023-02-28 NOTE — ANESTHESIA PREPROCEDURE EVALUATION
Procedure:  LABOR ANALGESIA    Relevant Problems   GYN   (+) 39 weeks gestation of pregnancy   (+) Encounter for supervision of normal first pregnancy in first trimester      HEMATOLOGY   (+) Anemia, antepartum, third trimester      PULMONARY   (+) Smoking        Physical Exam    Airway    Mallampati score: II  TM Distance: >3 FB  Neck ROM: full     Dental       Cardiovascular  Rhythm: regular, Rate: normal, Cardiovascular exam normal    Pulmonary  Pulmonary exam normal Breath sounds clear to auscultation,     Other Findings        Anesthesia Plan  ASA Score- 2     Anesthesia Type- epidural with ASA Monitors  Additional Monitors:   Airway Plan:           Plan Factors-Exercise tolerance (METS): >4 METS  Chart reviewed  Existing labs reviewed  Patient is not a current smoker  Obstructive sleep apnea risk education given perioperatively  Induction-     Postoperative Plan-     Informed Consent- Anesthetic plan and risks discussed with patient  Finasteride Counseling:  I discussed with the patient the risks of use of finasteride including but not limited to decreased libido, decreased ejaculate volume, gynecomastia, and depression. Women should not handle medication.  All of the patient's questions and concerns were addressed. Finasteride Male Counseling: Finasteride Counseling:  I discussed with the patient the risks of use of finasteride including but not limited to decreased libido, decreased ejaculate volume, gynecomastia, and depression. Women should not handle medication.  All of the patient's questions and concerns were addressed.

## 2023-04-05 ENCOUNTER — OFFICE VISIT (OUTPATIENT)
Dept: FAMILY MEDICINE CLINIC | Facility: CLINIC | Age: 28
End: 2023-04-05

## 2023-04-05 VITALS
BODY MASS INDEX: 31.86 KG/M2 | WEIGHT: 203 LBS | OXYGEN SATURATION: 98 % | HEART RATE: 77 BPM | SYSTOLIC BLOOD PRESSURE: 110 MMHG | HEIGHT: 67 IN | DIASTOLIC BLOOD PRESSURE: 78 MMHG | TEMPERATURE: 98.2 F

## 2023-04-05 DIAGNOSIS — J34.2 NOSE SEPTUM DEVIATION: ICD-10-CM

## 2023-04-05 DIAGNOSIS — R09.81 SINUS CONGESTION: ICD-10-CM

## 2023-04-05 DIAGNOSIS — E66.09 CLASS 1 OBESITY DUE TO EXCESS CALORIES WITHOUT SERIOUS COMORBIDITY WITH BODY MASS INDEX (BMI) OF 31.0 TO 31.9 IN ADULT: ICD-10-CM

## 2023-04-05 DIAGNOSIS — R10.11 RUQ PAIN: ICD-10-CM

## 2023-04-05 DIAGNOSIS — Z00.01 ENCOUNTER FOR WELL ADULT EXAM WITH ABNORMAL FINDINGS: Primary | ICD-10-CM

## 2023-04-05 DIAGNOSIS — H92.01 RIGHT EAR PAIN: ICD-10-CM

## 2023-04-05 DIAGNOSIS — Z13.29 SCREENING FOR THYROID DISORDER: ICD-10-CM

## 2023-04-05 DIAGNOSIS — R79.89 ABNORMAL CBC: ICD-10-CM

## 2023-04-05 PROBLEM — IMO0001 SMOKING: Status: RESOLVED | Noted: 2019-11-25 | Resolved: 2023-04-05

## 2023-04-05 PROBLEM — E66.811 CLASS 1 OBESITY DUE TO EXCESS CALORIES WITHOUT SERIOUS COMORBIDITY WITH BODY MASS INDEX (BMI) OF 31.0 TO 31.9 IN ADULT: Status: ACTIVE | Noted: 2022-08-05

## 2023-04-05 PROBLEM — F17.200 SMOKING: Status: RESOLVED | Noted: 2019-11-25 | Resolved: 2023-04-05

## 2023-04-05 RX ORDER — HYDROCORTISONE 25 MG/ML
LOTION TOPICAL
COMMUNITY
Start: 2023-02-16

## 2023-04-05 RX ORDER — FLUTICASONE PROPIONATE 50 MCG
2 SPRAY, SUSPENSION (ML) NASAL DAILY
Qty: 16 G | Refills: 0 | Status: SHIPPED | OUTPATIENT
Start: 2023-04-05

## 2023-04-05 NOTE — ASSESSMENT & PLAN NOTE
Finding on recent blood work done in February 2023 elevated white blood cell elevated redblood cell patient is symptomatic no fever no weight change recommend to repeat CBC was this

## 2023-04-05 NOTE — ASSESSMENT & PLAN NOTE
Most probably secondary to nasal congestion recommend to treat the nasal congestion and will reevaluate on the physical exam no sign of infection Tylenol for the pain

## 2023-04-05 NOTE — PROGRESS NOTES
ADULT ANNUAL PHYSICAL  [address] PRIMARY CARE [facility]    NAME: Huseyin Wilkins  AGE: 32 y o  SEX: female  : 1995     DATE: 2023     Assessment and Plan:     Problem List Items Addressed This Visit        Respiratory    Nose septum deviation     New diagnosis finding on recent physical exam discussed with the patient         Sinus congestion     Acute symptomatic recommended Flonase nasal spray 2 sprays nostril once a day proper use and possible side effect discussed with patient if no improvement to call the office         Relevant Medications    fluticasone (FLONASE) 50 mcg/act nasal spray    Other Relevant Orders    CBC and differential    Lipid Panel with Direct LDL reflex    TSH, 3rd generation with Free T4 reflex    Insulin, fasting       Other    Class 1 obesity due to excess calories without serious comorbidity with body mass index (BMI) of 31 0 to 31 9 in adult     BMI today 21 79 discussed with patient portion control low-carb low-fat diet and increase physical activity         Relevant Orders    CBC and differential    Lipid Panel with Direct LDL reflex    TSH, 3rd generation with Free T4 reflex    Insulin, fasting    Encounter for well adult exam with abnormal findings - Primary     Advice and education were given regarding nutrition, aerobic exercises, weight-bearing exercises, cardiovascular risk reduction, fall risk reduction, and age-appropriate supplements  The patient was counseled regarding instructions for management, risk factor reductions, prognosis, risks and benefits of treatment options, patient and family education, and importance of compliance with treatment         Patient follow-up with the GYN for her woman health           Relevant Orders    CBC and differential    Lipid Panel with Direct LDL reflex    TSH, 3rd generation with Free T4 reflex    Insulin, fasting    RUQ pain     New diagnosis symptomatic associated with recent nausea patient has a family history of gallbladder disease her mom pain shooting to her back recommend to do ultrasound of the right upper quadrant to rule out cholecystitis         Relevant Orders    CBC and differential    Lipid Panel with Direct LDL reflex    TSH, 3rd generation with Free T4 reflex    Insulin, fasting    US abdomen limited    Abnormal CBC     Finding on recent blood work done in February 2023 elevated white blood cell elevated redblood cell patient is symptomatic no fever no weight change recommend to repeat CBC was this         Relevant Orders    CBC and differential    Lipid Panel with Direct LDL reflex    TSH, 3rd generation with Free T4 reflex    Insulin, fasting    Right ear pain     Most probably secondary to nasal congestion recommend to treat the nasal congestion and will reevaluate on the physical exam no sign of infection Tylenol for the pain         Relevant Orders    CBC and differential    Lipid Panel with Direct LDL reflex    TSH, 3rd generation with Free T4 reflex    Insulin, fasting   Other Visit Diagnoses     Screening for thyroid disorder        Relevant Orders    TSH, 3rd generation with Free T4 reflex          Immunizations and preventive care screenings were discussed with patient today  Appropriate education was printed on patient's after visit summary  Counseling:  Alcohol/drug use: discussed moderation in alcohol intake, the recommendations for healthy alcohol use, and avoidance of illicit drug use  Dental Health: discussed importance of regular tooth brushing, flossing, and dental visits  Injury prevention: discussed safety/seat belts, safety helmets, smoke detectors, carbon dioxide detectors, and smoking near bedding or upholstery  Sexual health: discussed sexually transmitted diseases, partner selection, use of condoms, avoidance of unintended pregnancy, and contraceptive alternatives    Exercise: the importance of regular exercise/physical activity was discussed  Recommend exercise 3-5 times per week for at least 30 minutes  BMI Counseling: Body mass index is 31 79 kg/m²  The BMI is above normal  Nutrition recommendations include decreasing portion sizes, decreasing fast food intake and limiting drinks that contain sugar  Exercise recommendations include exercising 3-5 times per week  No pharmacotherapy was ordered  Rationale for BMI follow-up plan is due to patient being overweight or obese  Depression Screening and Follow-up Plan: Patient was screened for depression during today's encounter  They screened negative with a PHQ-2 score of 0  Return in about 1 year (around 4/5/2024)  Chief Complaint:     Chief Complaint   Patient presents with   • Follow-up     Blood work       History of Present Illness:     Adult Annual Physical   Patient here for a comprehensive physical exam  The patient reports problems - Patient today concerned about the pain in the right side this problem happened on and off since she traveled by airplane and she feels sharp Pain in her ear it comes and goes no discharge and no fever no upper respiratory infection but also patient gets nasal congestion and pressure on her sinus also patient concerned about the pain in the right upper quadrant that it comes and goes no nausea no vomiting no abdomen distention no weight loss no blood in the urine or stool past medical surgical family and social history reviewed with the patient also blood work from February 2023 reviewed with the patient  Diet and Physical Activity  Diet/Nutrition: No special diet  Exercise: no formal exercise  Depression Screening  PHQ-2/9 Depression Screening    Little interest or pleasure in doing things: 0 - not at all  Feeling down, depressed, or hopeless: 0 - not at all  PHQ-2 Score: 0  PHQ-2 Interpretation: Negative depression screen       General Health  Sleep: sleeps well  Hearing: normal - bilateral   Vision: no vision problems  Dental: regular dental visits  /GYN Health  F/up with gyn     Review of Systems:     Review of Systems   Constitutional: Negative for chills and fever  HENT: Positive for congestion, ear pain, sinus pressure and sinus pain  Negative for sore throat  Eyes: Negative for pain and visual disturbance  Respiratory: Negative for cough and shortness of breath  Cardiovascular: Negative for chest pain and palpitations  Gastrointestinal: Positive for abdominal pain  Negative for blood in stool, constipation, diarrhea, nausea and vomiting  Genitourinary: Negative for dysuria and hematuria  Musculoskeletal: Negative for arthralgias  Skin: Negative for color change and rash  Neurological: Negative for seizures and syncope  Hematological: Does not bruise/bleed easily  Psychiatric/Behavioral: Negative for behavioral problems  All other systems reviewed and are negative       Past Medical History:     Past Medical History:   Diagnosis Date   • Anal itching     since childhood   • Chicken pox    • COVID-19 affecting pregnancy, antepartum 02/01/2022   • Cyst of fallopian tube 2017    Right side, 11 cm, benign   • Generalized headaches    • Need for HPV vaccination     completed series 2018   • Pap smear for cervical cancer screening     5/2021-wnl   • PCB (post coital bleeding) 05/18/2021   • Postpartum atony of uterus with hemorrhage, delivered 03/12/2022   • Psoriasis    • Smoking 11/25/2019    Again advised to stop smoking discussed complication of smoking including heart disease, stroke, COPD and cancer      Past Surgical History:     Past Surgical History:   Procedure Laterality Date   • NOSE SURGERY      Nasal Septum deviation repair, lazer   • [place] OVARIAN CYSTECTOMY UNI/BI Right 3/1/2018    Procedure: LAPAROSCOPIC RIGHT SALPINGOCYSTECTOMY;  Surgeon: Marin Hinson MD;  Location: [facility];  Service: Gynecology      Social History:     Social History     Socioeconomic History   • Marital status: /Civil Union     Spouse name: None   • Number of children: 1   • Years of education: None   • Highest education level: Bachelor's degree (e g , BA, AB, BS)   Occupational History   • Occupation:    Tobacco Use   • Smoking status: Former     Types: Cigarettes     Passive exposure: Never   • Smokeless tobacco: Never   • Tobacco comments:     hookah   Vaping Use   • Vaping Use: Never used   Substance and Sexual Activity   • Alcohol use: Not Currently     Alcohol/week: 0 0 - 1 0 standard drinks     Comment: occasional   • Drug use: No   • Sexual activity: Yes     Partners: Male     Birth control/protection: Rhythm     Comment: lifetime partners: 1   Other Topics Concern   • None   Social History Narrative    College student - Electrical engineering    [employer]    Hookah pipe smoker    Active job    Accepts blood products        Exercise: active job    Calcium: 1 serving cheese 3-4x  week, yogurt once/week     Social Determinants of Health     Financial Resource Strain: Not on file   Food Insecurity: Not on file   Transportation Needs: Not on file   Physical Activity: Not on file   Stress: Not on file   Social Connections: Not on file   Intimate Partner Violence: Not on file   Housing Stability: Not on file      Family History:     Family History   Problem Relation Age of Onset   • Hyperlipidemia Mother    • Stroke Father 36        He was a smoker   • Greenwood's disease Maternal Grandmother    • Breast cancer Neg Hx    • Ovarian cancer Neg Hx    • Colon cancer Neg Hx    • Asthma Neg Hx    • Cancer Neg Hx    • Deep vein thrombosis Neg Hx    • Pulmonary embolism Neg Hx    • Venous thrombosis Neg Hx    • Diabetes Neg Hx    • Heart failure Neg Hx    • Hypertension Neg Hx    • Lung disease Neg Hx    • Migraines Neg Hx    • Miscarriages / Stillbirths Neg Hx    • Seizures Neg Hx    • Thyroid disease Neg Hx    • Transient ischemic attack Neg Hx       Current Medications:     Current "Outpatient Medications   Medication Sig Dispense Refill   • fluticasone (FLONASE) 50 mcg/act nasal spray 2 sprays into each nostril daily 16 g 0   • hydrocortisone 2 5 % lotion APPLY TO FACE UP TO TWICE DAILY FOR 7-10 DAYS AS NEEDED WITH FLARE UPS       No current facility-administered medications for this visit  Allergies:     No Known Allergies   Physical Exam:     /78 (BP Location: Left arm, Patient Position: Sitting)   Pulse 77   Temp 98 2 °F (36 8 °C) (Tympanic)   Ht 5' 7\" (1 702 m)   Wt 92 1 kg (203 lb)   SpO2 98%   BMI 31 79 kg/m²     Physical Exam  Vitals and nursing note reviewed  Constitutional:       General: She is not in acute distress  Appearance: She is well-developed  HENT:      Head: Normocephalic and atraumatic  Right Ear: Tympanic membrane normal  There is no impacted cerumen  Left Ear: Tympanic membrane normal  There is no impacted cerumen  Nose: Septal deviation and congestion present  Right Turbinates: Swollen  Right Sinus: Maxillary sinus tenderness present  Mouth/Throat:      Pharynx: No oropharyngeal exudate or posterior oropharyngeal erythema  Eyes:      Conjunctiva/sclera: Conjunctivae normal    Cardiovascular:      Rate and Rhythm: Normal rate and regular rhythm  Heart sounds: No murmur heard  Pulmonary:      Effort: Pulmonary effort is normal  No respiratory distress  Breath sounds: Normal breath sounds  Abdominal:      General: There is no distension  Palpations: Abdomen is soft  Tenderness: There is abdominal tenderness in the right upper quadrant  There is no guarding or rebound  Hernia: No hernia is present  Musculoskeletal:         General: No swelling  Cervical back: Neck supple  Skin:     General: Skin is warm and dry  Capillary Refill: Capillary refill takes less than 2 seconds  Findings: No erythema or rash     Neurological:      Mental Status: She is alert and oriented to " person, place, and time     Psychiatric:         Mood and Affect: Mood normal           Lisa Mcnair MD   [address] [place] [address]

## 2023-04-05 NOTE — ASSESSMENT & PLAN NOTE
Acute symptomatic recommended Flonase nasal spray 2 sprays nostril once a day proper use and possible side effect discussed with patient if no improvement to call the office

## 2023-04-05 NOTE — ASSESSMENT & PLAN NOTE
Advice and education were given regarding nutrition, aerobic exercises, weight-bearing exercises, cardiovascular risk reduction, fall risk reduction, and age-appropriate supplements  The patient was counseled regarding instructions for management, risk factor reductions, prognosis, risks and benefits of treatment options, patient and family education, and importance of compliance with treatment         Patient follow-up with the GYN for her woman health

## 2023-04-05 NOTE — ASSESSMENT & PLAN NOTE
New diagnosis symptomatic associated with recent nausea patient has a family history of gallbladder disease her mom pain shooting to her back recommend to do ultrasound of the right upper quadrant to rule out cholecystitis

## 2023-04-25 ENCOUNTER — OFFICE VISIT (OUTPATIENT)
Dept: FAMILY MEDICINE CLINIC | Facility: CLINIC | Age: 28
End: 2023-04-25

## 2023-04-25 VITALS
HEART RATE: 83 BPM | HEIGHT: 67 IN | DIASTOLIC BLOOD PRESSURE: 80 MMHG | BODY MASS INDEX: 31.89 KG/M2 | OXYGEN SATURATION: 98 % | WEIGHT: 203.2 LBS | TEMPERATURE: 99.7 F | SYSTOLIC BLOOD PRESSURE: 110 MMHG

## 2023-04-25 DIAGNOSIS — R79.89 ABNORMAL CBC: Primary | ICD-10-CM

## 2023-04-25 RX ORDER — RISANKIZUMAB-RZAA 150 MG/ML
INJECTION SUBCUTANEOUS
COMMUNITY
Start: 2023-04-11

## 2023-04-25 NOTE — PROGRESS NOTES
"Name: Bassam Johnson      : 1995      MRN: [id number]  Encounter Provider: Rodolfo Christianson MD  Encounter Date: 2023   Encounter department: [place] [address]  Abnormal CBC  Assessment & Plan:  Improvement in the white blood cell compared with before reassured the patient             Subjective      Patient here follow-up with a chronic condition recent blood work discussed with patient and ultrasound of the right upper quadrant I reviewed with the patient    Review of Systems   Constitutional: Negative for chills and fever  HENT: Negative for ear pain and sore throat  Eyes: Negative for pain and visual disturbance  Respiratory: Negative for cough and shortness of breath  Cardiovascular: Negative for chest pain and palpitations  Gastrointestinal: Negative for abdominal pain, constipation, diarrhea and vomiting  Genitourinary: Negative for dysuria and hematuria  Musculoskeletal: Negative for arthralgias and back pain  Skin: Negative for color change and rash  Neurological: Negative for seizures and syncope  All other systems reviewed and are negative  Current Outpatient Medications on File Prior to Visit   Medication Sig   • hydrocortisone 2 5 % lotion APPLY TO FACE UP TO TWICE DAILY FOR 7-10 DAYS AS NEEDED WITH FLARE UPS   • Skyrizi Pen 150 MG/ML SOAJ        Objective     /80 (BP Location: Left arm, Patient Position: Sitting)   Pulse 83   Temp 99 7 °F (37 6 °C) (Tympanic)   Ht 5' 7\" (1 702 m)   Wt 92 2 kg (203 lb 3 2 oz)   SpO2 98%   BMI 31 83 kg/m²     Physical Exam  Vitals and nursing note reviewed  Constitutional:       General: She is not in acute distress  Appearance: She is well-developed  She is not diaphoretic  HENT:      Head: Normocephalic  Right Ear: External ear normal       Left Ear: External ear normal    Eyes:      General:         Right eye: No discharge           Left eye: No " discharge  Conjunctiva/sclera: Conjunctivae normal    Neck:      Vascular: No JVD  Cardiovascular:      Rate and Rhythm: Normal rate and regular rhythm  Heart sounds: Normal heart sounds  No murmur heard  No gallop  Pulmonary:      Effort: Pulmonary effort is normal  No respiratory distress  Breath sounds: Normal breath sounds  No stridor  No wheezing or rales  Chest:      Chest wall: No tenderness  Abdominal:      General: There is no distension  Palpations: Abdomen is soft  There is no mass  Tenderness: There is no abdominal tenderness  There is no rebound  Musculoskeletal:         General: No tenderness  Cervical back: Normal range of motion and neck supple  Lymphadenopathy:      Cervical: No cervical adenopathy  Skin:     General: Skin is warm  Findings: No erythema or rash  Neurological:      Mental Status: She is alert and oriented to person, place, and time         Anselmo Anderson MD

## 2023-10-28 ENCOUNTER — OFFICE VISIT (OUTPATIENT)
Dept: URGENT CARE | Facility: MEDICAL CENTER | Age: 28
End: 2023-10-28

## 2023-10-28 ENCOUNTER — APPOINTMENT (OUTPATIENT)
Dept: RADIOLOGY | Facility: MEDICAL CENTER | Age: 28
End: 2023-10-28

## 2023-10-28 VITALS
SYSTOLIC BLOOD PRESSURE: 116 MMHG | WEIGHT: 200 LBS | HEART RATE: 80 BPM | DIASTOLIC BLOOD PRESSURE: 70 MMHG | RESPIRATION RATE: 18 BRPM | OXYGEN SATURATION: 98 % | BODY MASS INDEX: 31.39 KG/M2 | TEMPERATURE: 98.2 F | HEIGHT: 67 IN

## 2023-10-28 DIAGNOSIS — S89.91XA INJURY OF RIGHT KNEE, INITIAL ENCOUNTER: Primary | ICD-10-CM

## 2023-10-28 DIAGNOSIS — S89.91XA INJURY OF RIGHT KNEE, INITIAL ENCOUNTER: ICD-10-CM
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
## 2023-10-28 PROCEDURE — 99213 OFFICE O/P EST LOW 20 MIN: CPT

## 2023-10-28 PROCEDURE — 73564 X-RAY EXAM KNEE 4 OR MORE: CPT

## 2023-10-28 NOTE — PATIENT INSTRUCTIONS
Tylenol/Ibuprofen for pain  Wear ACE wrap for support     Ice 20 minutes 3-4 times per day for 3 days  Insulate the skin from the ice to prevent frostbite  Rest  Elevate    Follow up with orthopedic if symptoms do not improve    Follow up with PCP in 3-5 days. Proceed to ER if symptoms worsen.

## 2023-10-28 NOTE — PROGRESS NOTES
[facility]        NAME: Marielos Vasquez is a 29 y.o. female  : 1995    MRN: [id number]  DATE: 2023  TIME: 10:36 PM    Assessment and Plan   Injury of right knee, initial encounter [S89.91XA]  1. Injury of right knee, initial encounter  XR knee 4+ vw right injury      2. Fall, initial encounter          XR knee 4+ vw right injury: No acute fracture per my read. Pending radiology final read. ACE wrap provided    Patient Instructions   Tylenol/Ibuprofen for pain  Wear ACE wrap for support     Ice 20 minutes 3-4 times per day for 3 days  Insulate the skin from the ice to prevent frostbite  Rest  Elevate    Follow up with orthopedic if symptoms do not improve    Follow up with PCP in 3-5 days. Proceed to ER if symptoms worsen. Chief Complaint     Chief Complaint   Patient presents with   • Knee Pain     Patient states she fell on right knee today; bruising and swelling noted; able to ambulate but with pain      History of Present Illness       Knee Pain   The injury mechanism was a direct blow (Fall onto R knee). The pain is present in the right knee. The pain has been Constant since onset. Pertinent negatives include no inability to bear weight, loss of motion, loss of sensation, muscle weakness, numbness or tingling. Associated symptoms comments: Denies any LOC. She reports no foreign bodies present. The symptoms are aggravated by movement, palpation and weight bearing (Pain worsens with ambulation). She has tried nothing for the symptoms. Review of Systems   Review of Systems   Constitutional:  Negative for chills, diaphoresis, fatigue and fever. Respiratory: Negative. Negative for cough, shortness of breath, wheezing and stridor. Cardiovascular: Negative. Negative for chest pain and palpitations. Musculoskeletal:  Positive for arthralgias and myalgias. Negative for gait problem and joint swelling. Skin:  Positive for color change (Bruising to R knee). Neurological:  Negative for tingling and numbness.      Current Medications       Current Outpatient Medications:   •  hydrocortisone 2.5 % lotion, APPLY TO FACE UP TO TWICE DAILY FOR 7-10 DAYS AS NEEDED WITH FLARE UPS, Disp: , Rfl:   •  Skyrizi Pen 150 MG/ML SOAJ, , Disp: , Rfl:     Current Allergies     Allergies as of 10/28/2023   • (No Known Allergies)            The following portions of the patient's history were reviewed and updated as appropriate: allergies, current medications, past family history, past medical history, past social history, past surgical history and problem list.     Past Medical History:   Diagnosis Date   • Anal itching     since childhood   • Chicken pox    • COVID-19 affecting pregnancy, antepartum 02/01/2022   • Cyst of fallopian tube 2017    Right side, 11 cm, benign   • Generalized headaches    • Need for HPV vaccination     completed series 2018   • Pap smear for cervical cancer screening     5/2021-wnl   • PCB (post coital bleeding) 05/18/2021   • Postpartum atony of uterus with hemorrhage, delivered 03/12/2022   • Psoriasis    • Smoking 11/25/2019    Again advised to stop smoking discussed complication of smoking including heart disease, stroke, COPD and cancer       Past Surgical History:   Procedure Laterality Date   • NOSE SURGERY      Nasal Septum deviation repair, orly   • [place] OVARIAN CYSTECTOMY UNI/BI Right 3/1/2018    Procedure: LAPAROSCOPIC RIGHT SALPINGOCYSTECTOMY;  Surgeon: Santos Juarez MD;  Location: [facility] OR;  Service: Gynecology       Family History   Problem Relation Age of Onset   • Hyperlipidemia Mother    • Stroke Father 36        He was a smoker   • Kimble's disease Maternal Grandmother    • Breast cancer Neg Hx    • Ovarian cancer Neg Hx    • Colon cancer Neg Hx    • Asthma Neg Hx    • Cancer Neg Hx    • Deep vein thrombosis Neg Hx    • Pulmonary embolism Neg Hx    • Venous thrombosis Neg Hx    • Diabetes Neg Hx    • Heart failure Neg Hx    • Hypertension Neg Hx    • Lung disease Neg Hx    • Migraines Neg Hx    • Miscarriages / Stillbirths Neg Hx    • Seizures Neg Hx    • Thyroid disease Neg Hx    • Transient ischemic attack Neg Hx          Medications have been verified. Objective   /70   Pulse 80   Temp 98.2 °F (36.8 °C) (Temporal)   Resp 18   Ht 5' 7" (1.702 m)   Wt 90.7 kg (200 lb)   SpO2 98%   BMI 31.32 kg/m²        Physical Exam     Physical Exam  Vitals and nursing note reviewed. Constitutional:       General: She is not in acute distress. Appearance: Normal appearance. She is not ill-appearing, toxic-appearing or diaphoretic. HENT:      Head: Normocephalic. Cardiovascular:      Rate and Rhythm: Normal rate and regular rhythm. Pulses: Normal pulses. Heart sounds: Normal heart sounds. No murmur heard. Pulmonary:      Effort: Pulmonary effort is normal. No respiratory distress. Breath sounds: Normal breath sounds. No stridor. No wheezing, rhonchi or rales. Chest:      Chest wall: No tenderness. Musculoskeletal:         General: Swelling, tenderness and signs of injury present. Right knee: Swelling present. No effusion, erythema, lacerations, bony tenderness or crepitus. Tenderness present. Normal alignment. Instability Tests: Anterior drawer test negative. Posterior drawer test negative. Skin:     General: Skin is warm. Findings: Bruising present. Neurological:      Mental Status: She is alert.

## 2023-11-08 ENCOUNTER — ANNUAL EXAM (OUTPATIENT)
Dept: OBGYN CLINIC | Facility: CLINIC | Age: 28
End: 2023-11-08

## 2023-11-08 VITALS
DIASTOLIC BLOOD PRESSURE: 76 MMHG | WEIGHT: 195 LBS | BODY MASS INDEX: 30.61 KG/M2 | HEIGHT: 67 IN | SYSTOLIC BLOOD PRESSURE: 118 MMHG

## 2023-11-08 DIAGNOSIS — Z01.419 ENCOUNTER FOR ANNUAL ROUTINE GYNECOLOGICAL EXAMINATION: Primary | ICD-10-CM

## 2023-11-08 DIAGNOSIS — Z72.3 INADEQUATE EXERCISE: ICD-10-CM

## 2023-11-08 DIAGNOSIS — E58 DIETARY CALCIUM DEFICIENCY: ICD-10-CM

## 2023-11-08 PROBLEM — H92.01 RIGHT EAR PAIN: Status: RESOLVED | Noted: 2023-04-05 | Resolved: 2023-11-08

## 2023-11-08 PROBLEM — R10.11 RUQ PAIN: Status: RESOLVED | Noted: 2023-04-05 | Resolved: 2023-11-08

## 2023-11-08 NOTE — PROGRESS NOTES
Pt is a 29 y.o. [employer] with Patient's last menstrual period was 10/14/2023 (approximate). using [employer] for [employer] presents for preventive care. She notes the same partner since her last STI evaluation. In her lifetime she has been involved with 1 partner . Safe sexual practices (monogomy) are followed consistently. She does  feel safe in the relationship. She does feel safe in her home. Her calcium intake encompasses cheese and yogurt for a total of 1 servings daily on average. She does not take additional Vitamin D (MVI or supplement). She exercises  3-4  times per week seasonally    Her menses occur every 28 Days, last 5-6 days and require super tampons every  2-4  hours. Menstrual History:  OB History          1    Para   1    Term   1       0    AB   0    Living   1         SAB   0    IAB   0    Ectopic   0    Multiple   0    Live Births   1           Obstetric Comments   Menarche: 15    28/5-6/super tampon every 2-4 hours for first three days                Menarche age: 15  Patient's last menstrual period was 10/14/2023 (approximate). She has completed the HPV vaccine series appropriate for age.   tobacco use : does use tobacco     occasional hookah--advised of risks         Colonoscopy: not indicated  Mammogram: not indicated  Pap: 2021-wnl, repeat next year    Past Medical History:   Diagnosis Date    Anal itching     since childhood    Chicken pox     COVID-19 affecting pregnancy, antepartum 2022    Cyst of fallopian tube 2017    Right side, 11 cm, benign    Generalized headaches     Need for HPV vaccination     completed series 2018    Pap smear for cervical cancer screening     2021-wnl    PCB (post coital bleeding) 2021    Postpartum atony of uterus with hemorrhage, delivered 2022    Psoriasis     Smoking 2019    Again advised to stop smoking discussed complication of smoking including heart disease, stroke, COPD and cancer       Past Surgical History:   Procedure Laterality Date    NOSE SURGERY      Nasal Septum deviation repair, lazer    [place] OVARIAN CYSTECTOMY UNI/BI Right 3/1/2018    Procedure: LAPAROSCOPIC RIGHT SALPINGOCYSTECTOMY;  Surgeon: Reagan Varner MD;  Location: [facility];  Service: Gynecology       OB History    Para Term  AB Living   1 1 1 0 0 1   SAB IAB Ectopic Multiple Live Births   0 0 0 0 1      # Outcome Date GA Lbr Kee/2nd Weight Sex Delivery Anes PTL Lv   1 Term 22 40w0d / 01:01 3290 g (7 lb 4.1 oz) M Vag-Spont EPI N CASI      Obstetric Comments   Menarche: 13      28/5-6/super tampon every 2-4 hours for first three days            Current Outpatient Medications:     Skyrizi Pen 150 MG/ML SOAJ, , Disp: , Rfl:     No Known Allergies    Social History     Socioeconomic History    Marital status: /Civil Union     Spouse name: Oneil Steiner    Number of children: 1    Years of education: None    Highest education level: Bachelor's degree (e.g., BA, AB, BS)   Occupational History    Occupation: Masters degre student   Tobacco Use    Smoking status: Some Days     Passive exposure: Never    Smokeless tobacco: Never    Tobacco comments:     hookah   Vaping Use    Vaping Use: Never used   Substance and Sexual Activity    Alcohol use: Yes     Alcohol/week: 0.0 - 1.0 standard drinks of alcohol     Comment: occasional    Drug use: No    Sexual activity: Yes     Partners: Male     Birth control/protection: Rhythm, Coitus interruptus     Comment: lifetime partners: 1   Other Topics Concern    None   Social History Narrative    College student - Electrical engineering    [employer] [employer] [employer] [facility]    Hookah pipe smoker    Active job    Accepts blood products        Exercise: walking seasonally 3-4x/week    Calcium: 1 serving cheese 3-4x. week, yogurt once/week     Social Determinants of Health     Financial Resource Strain: Not on file   Food Insecurity: Not on file   Transportation Needs: Not on file   Physical Activity: Not on file   Stress: Not on file   Social Connections: Not on file   Intimate Partner Violence: Not on file   Housing Stability: Not on file       Family History   Problem Relation Age of Onset    Hyperlipidemia Mother     Stroke Father 36        He was a smoker    No Known Problems Brother     Gopi's disease Maternal Grandmother     No Known Problems Maternal Grandfather     No Known Problems Paternal Grandmother     No Known Problems Paternal Grandfather     Breast cancer Neg Hx     Ovarian cancer Neg Hx     Colon cancer Neg Hx     Asthma Neg Hx     Cancer Neg Hx     Deep vein thrombosis Neg Hx     Pulmonary embolism Neg Hx     Venous thrombosis Neg Hx     Diabetes Neg Hx     Heart failure Neg Hx     Hypertension Neg Hx     Lung disease Neg Hx     Migraines Neg Hx     Miscarriages / Stillbirths Neg Hx     Seizures Neg Hx     Thyroid disease Neg Hx     Transient ischemic attack Neg Hx        Blood pressure 118/76, height 5' 7" (1.702 m), weight 88.5 kg (195 lb), last menstrual period 10/14/2023, not currently breastfeeding. and Body mass index is 30.54 kg/m². Physical Exam  Constitutional:       General: She is not in acute distress. Appearance: Normal appearance. She is well-developed. She is not ill-appearing. HENT:      Head: Normocephalic and atraumatic. Eyes:      Extraocular Movements: Extraocular movements intact. Conjunctiva/sclera: Conjunctivae normal.   Neck:      Thyroid: No thyromegaly. Trachea: No tracheal deviation. Cardiovascular:      Rate and Rhythm: Normal rate and regular rhythm. Heart sounds: Normal heart sounds. Pulmonary:      Effort: Pulmonary effort is normal. No respiratory distress. Breath sounds: Normal breath sounds. No stridor. No wheezing or rales. Abdominal:      General: Bowel sounds are normal. There is no distension. Palpations: Abdomen is soft. There is no mass. Tenderness: There is no abdominal tenderness.  There is no guarding or rebound. Hernia: No hernia is present. Musculoskeletal:         General: No tenderness. Normal range of motion. Cervical back: Normal range of motion and neck supple. Lymphadenopathy:      Cervical: No cervical adenopathy. Skin:     General: Skin is warm. Findings: No erythema or rash. Neurological:      Mental Status: She is alert and oriented to person, place, and time. Psychiatric:         Mood and Affect: Mood normal.         Behavior: Behavior normal.         Thought Content: Thought content normal.         Judgment: Judgment normal.         Breasts: breasts appear normal, no suspicious masses, no skin or nipple changes or axillary nodes, symmetric fibrous changes in both upper outer quadrants. vulva: normal external genitalia for age and no lesions, masses, epithelial changes, or exudate  vagina: color pink and rugae  well formed rugae  cervix: parous and no lesions   uterus: NSSC, AF, NT, mobile  adnexa: no masses or tenderness      A/P:  Pt is a 29 y.o. Gonzalez  with      Tiffanie Lo was seen today for gynecologic exam.    Diagnoses and all orders for this visit:    Encounter for annual routine gynecological examination  -stable examination  -pap up to date    Dietary calcium deficiency  Patient advised recommendation of daily dietary calcium of 1000 mg calcium. Inadequate exercise  Patient advised recommendation of exercise 5 times per week for 30 minutes. BMI 30.0-30.9,adult  Patient advised recommendation of BMI to be between 19-25.

## 2024-01-13 NOTE — PROGRESS NOTES
Patient is a 21 y o  [employer] Boy with Patient's last menstrual period was 2018  who presents requesting evaluation of right hip pain  The patent reports that she previously had a large right fallopian tube cyst and her pain had originally started in the right hip  She reports that the pain is usually during her menses and during ovulation which is how her pain started originally  She is concerned she may have another cyst  She denies dyspareunia  She denies abnormal vaginal discharge, but reports a bad vaginal odor  SHe reports she noticed the odor about 2 months ago  She denies any vaginal itching or burning  She reports her hip/pelvic pain started a few months ago  Past Medical History:   Diagnosis Date    Anal itching     since childhood    Chicken pox     Cyst of fallopian tube 2017    Right side, 11 cm, benign    Generalized headaches        Past Surgical History:   Procedure Laterality Date    NOSE SURGERY      Nasal Septum deviation repair, lazer    [place] REMOVAL OF OVARIAN CYST(S) Right 3/1/2018    Procedure: LAPAROSCOPIC RIGHT SALPINGOCYSTECTOMY;  Surgeon: Jennifer Soriano MD;  Location: [facility];  Service: Gynecology       OB History    Para Term  AB Living   0 0 0 0 0 0   SAB TAB Ectopic Multiple Live Births   0 0 0 0 0         Obstetric Comments   Menarche: 15                                            Current Outpatient Prescriptions:     ferrous sulfate 325 (65 Fe) mg tablet, Take 1 tablet (325 mg total) by mouth daily with breakfast, Disp: 30 tablet, Rfl: 2    No Known Allergies    Social History     Social History    Marital status: /Civil Union     Spouse name: N/A    Number of children: 0    Years of education: college student     Occupational History    student      Social History Main Topics    Smoking status: Current Every Day Smoker    Smokeless tobacco: Current User      Comment: pt smokes hookah    Alcohol use Yes      Comment: occasional    Drug use:  No  Sexual activity: Yes     Partners: Male     Birth control/ protection: Rhythm      Comment: lifetime partners: 1     Other Topics Concern    Not on file     Social History Narrative    College student    [employer]    Hookah pipe smoker    [place] lifestyle    Accepts blood products       Family History   Problem Relation Age of Onset    Hyperlipidemia Mother     Heart disease Mother     Heart attack Father 36    Heart disease Father     Gopi's disease Maternal Grandmother     Breast cancer Neg Hx     Ovarian cancer Neg Hx     Colon cancer Neg Hx        Review of Systems   Constitutional: Negative for chills, fatigue, fever and unexpected weight change  HENT: Negative for congestion, mouth sores and sore throat  Respiratory: Negative for cough, chest tightness, shortness of breath and wheezing  Cardiovascular: Negative for chest pain and palpitations  Gastrointestinal: Negative for abdominal distention, abdominal pain, constipation, diarrhea, nausea and vomiting  Endocrine: Negative for cold intolerance and heat intolerance  Genitourinary: Positive for pelvic pain and vaginal discharge (with odor)  Negative for dyspareunia, dysuria, genital sores, menstrual problem, vaginal bleeding and vaginal pain  Musculoskeletal: Negative for arthralgias  Skin: Negative for color change and rash  Neurological: Negative for dizziness, light-headedness and headaches  Hematological: Negative for adenopathy  Blood pressure 120/80, weight 98 6 kg (217 lb 6 4 oz), last menstrual period 12/11/2018, not currently breastfeeding  and Body mass index is 35 09 kg/m²  Physical Exam   Constitutional: She is oriented to person, place, and time  She appears well-developed and well-nourished  HENT:   Head: Normocephalic and atraumatic  Eyes: Conjunctivae and EOM are normal    Neck: Normal range of motion  No tracheal deviation present  No thyromegaly present     Pulmonary/Chest: Effort normal  No stridor  Abdominal: Soft  She exhibits no distension and no mass  There is no tenderness  There is no rebound and no guarding  Musculoskeletal: Normal range of motion  She exhibits no edema or tenderness  Neurological: She is alert and oriented to person, place, and time  Skin: Skin is warm  No rash noted  No erythema  Psychiatric: She has a normal mood and affect  Her behavior is normal  Judgment and thought content normal         vulva: normal external genitalia for age and no lesions, masses, epithelial changes, or exudate  vagina: color pink, rugae  well formed rugae and discharge  scant clear non odorous  cervix: nullip and no lesions   uterus: NSSC, AF, NT, mobile  adnexa: no masses bilaterally, pt with right sided tenderness    Wet Mount/KOH Results:  Bacteria: few  Clue cells:  occasional  Trichomonas: absent  Yeast (with or without hyphae): absent  PH: yellow  KOH:  Negative  Whiff: negative        A/P:  Pt is a 21 y o  Almazan Palestine with      Diagnoses and all orders for this visit:    Pelvic pain    Vaginal odor  -     POCT wet mount      Pt reassured that her wet mount/koh is negative  Reviewed how sweating can exacerbate odor and to keep the area dry and clean  Pt underwent pelvic u/s in office which was normal  Pt reassured  Pain with menses and with ovulation likely physiologic  Pt will call if symptoms worse  PAST MEDICAL HISTORY:  Burn to right hand    No pertinent past medical history

## 2024-01-26 ENCOUNTER — TELEPHONE (OUTPATIENT)
Dept: OBGYN CLINIC | Facility: CLINIC | Age: 29
End: 2024-01-26

## 2024-01-26 NOTE — TELEPHONE ENCOUNTER
Called pt. Pt was made aware of visit 11/08/23 being courtesy and pt being responsible in the future. Pt stated she had change her insurance to [employer] which has been updated.

## 2024-01-26 NOTE — TELEPHONE ENCOUNTER
Notified by [facility] billing that we are non par with patient's [employer] EPO. Her appointment with DOS:  11/8/2023 will be written off as a courtesy but she will be responsible for any future charges.    Please notify the patient of the same.

## 2024-02-03 ENCOUNTER — OFFICE VISIT (OUTPATIENT)
Dept: URGENT CARE | Age: 29
End: 2024-02-03
Payer: COMMERCIAL

## 2024-02-03 ENCOUNTER — APPOINTMENT (EMERGENCY)
Dept: CT IMAGING | Facility: HOSPITAL | Age: 29
End: 2024-02-03
Payer: COMMERCIAL

## 2024-02-03 ENCOUNTER — APPOINTMENT (EMERGENCY)
Dept: ULTRASOUND IMAGING | Facility: HOSPITAL | Age: 29
End: 2024-02-03
Payer: COMMERCIAL

## 2024-02-03 ENCOUNTER — HOSPITAL ENCOUNTER (EMERGENCY)
Facility: HOSPITAL | Age: 29
Discharge: HOME | End: 2024-02-03
Attending: EMERGENCY MEDICINE
Payer: COMMERCIAL

## 2024-02-03 VITALS
OXYGEN SATURATION: 96 % | HEART RATE: 108 BPM | TEMPERATURE: 98.1 F | DIASTOLIC BLOOD PRESSURE: 69 MMHG | SYSTOLIC BLOOD PRESSURE: 118 MMHG | RESPIRATION RATE: 18 BRPM

## 2024-02-03 VITALS
DIASTOLIC BLOOD PRESSURE: 71 MMHG | SYSTOLIC BLOOD PRESSURE: 126 MMHG | RESPIRATION RATE: 20 BRPM | HEART RATE: 104 BPM | TEMPERATURE: 97.4 F | OXYGEN SATURATION: 98 %

## 2024-02-03 DIAGNOSIS — R82.71 BACTERIURIA IN PREGNANCY: ICD-10-CM

## 2024-02-03 DIAGNOSIS — Z34.91 FIRST TRIMESTER PREGNANCY: Primary | ICD-10-CM

## 2024-02-03 DIAGNOSIS — O99.891 BACTERIURIA IN PREGNANCY: ICD-10-CM

## 2024-02-03 DIAGNOSIS — O36.80X0 PREGNANCY OF UNKNOWN ANATOMIC LOCATION: ICD-10-CM

## 2024-02-03 DIAGNOSIS — R10.32 LEFT LOWER QUADRANT ABDOMINAL PAIN: Primary | ICD-10-CM

## 2024-02-03 LAB
ALBUMIN SERPL BCP-MCNC: 4.2 G/DL (ref 3.5–5)
ALP SERPL-CCNC: 46 U/L (ref 34–104)
ALT SERPL W P-5'-P-CCNC: 15 U/L (ref 7–52)
ANION GAP SERPL CALCULATED.3IONS-SCNC: 8 MMOL/L
AST SERPL W P-5'-P-CCNC: 15 U/L (ref 13–39)
B-HCG SERPL-ACNC: 1212 MIU/ML (ref 0–5)
BACTERIA UR QL AUTO: ABNORMAL /HPF
BASOPHILS # BLD AUTO: 0.02 THOUSANDS/ÂΜL (ref 0–0.1)
BASOPHILS NFR BLD AUTO: 0 % (ref 0–1)
BILIRUB DIRECT SERPL-MCNC: 0.07 MG/DL (ref 0–0.2)
BILIRUB SERPL-MCNC: 0.39 MG/DL (ref 0.2–1)
BILIRUB UR QL STRIP: NEGATIVE
BNP SERPL-MCNC: 11 PG/ML (ref 0–100)
BUN SERPL-MCNC: 16 MG/DL (ref 5–25)
CALCIUM SERPL-MCNC: 8.8 MG/DL (ref 8.4–10.2)
CARDIAC TROPONIN I PNL SERPL HS: <2 NG/L
CHLORIDE SERPL-SCNC: 105 MMOL/L (ref 96–108)
CLARITY UR: ABNORMAL
CO2 SERPL-SCNC: 22 MMOL/L (ref 21–32)
COLOR UR: YELLOW
CREAT SERPL-MCNC: 0.64 MG/DL (ref 0.6–1.3)
D DIMER PPP FEU-MCNC: 1.65 UG/ML FEU
EOSINOPHIL # BLD AUTO: 0.03 THOUSAND/ÂΜL (ref 0–0.61)
EOSINOPHIL NFR BLD AUTO: 0 % (ref 0–6)
ERYTHROCYTE [DISTWIDTH] IN BLOOD BY AUTOMATED COUNT: 14.7 % (ref 11.6–15.1)
EXT PREGNANCY TEST URINE: POSITIVE
EXT. CONTROL: ABNORMAL
FLUAV RNA RESP QL NAA+PROBE: NEGATIVE
FLUBV RNA RESP QL NAA+PROBE: NEGATIVE
GFR SERPL CREATININE-BSD FRML MDRD: 121 ML/MIN/1.73SQ M
GLUCOSE SERPL-MCNC: 93 MG/DL (ref 65–140)
GLUCOSE UR STRIP-MCNC: NEGATIVE MG/DL
HCT VFR BLD AUTO: 41.1 % (ref 34.8–46.1)
HGB BLD-MCNC: 13.3 G/DL (ref 11.5–15.4)
HGB UR QL STRIP.AUTO: NEGATIVE
IMM GRANULOCYTES # BLD AUTO: 0.04 THOUSAND/UL (ref 0–0.2)
IMM GRANULOCYTES NFR BLD AUTO: 1 % (ref 0–2)
KETONES UR STRIP-MCNC: ABNORMAL MG/DL
LEUKOCYTE ESTERASE UR QL STRIP: ABNORMAL
LIPASE SERPL-CCNC: 9 U/L (ref 11–82)
LYMPHOCYTES # BLD AUTO: 0.9 THOUSANDS/ÂΜL (ref 0.6–4.47)
LYMPHOCYTES NFR BLD AUTO: 11 % (ref 14–44)
MCH RBC QN AUTO: 25 PG (ref 26.8–34.3)
MCHC RBC AUTO-ENTMCNC: 32.4 G/DL (ref 31.4–37.4)
MCV RBC AUTO: 77 FL (ref 82–98)
MONOCYTES # BLD AUTO: 0.57 THOUSAND/ÂΜL (ref 0.17–1.22)
MONOCYTES NFR BLD AUTO: 7 % (ref 4–12)
MUCOUS THREADS UR QL AUTO: ABNORMAL
NEUTROPHILS # BLD AUTO: 6.69 THOUSANDS/ÂΜL (ref 1.85–7.62)
NEUTS SEG NFR BLD AUTO: 81 % (ref 43–75)
NITRITE UR QL STRIP: NEGATIVE
NON-SQ EPI CELLS URNS QL MICRO: ABNORMAL /HPF
NRBC BLD AUTO-RTO: 0 /100 WBCS
PH UR STRIP.AUTO: 6 [PH]
PLATELET # BLD AUTO: 251 THOUSANDS/UL (ref 149–390)
PMV BLD AUTO: 9.7 FL (ref 8.9–12.7)
POTASSIUM SERPL-SCNC: 3.7 MMOL/L (ref 3.5–5.3)
PROT SERPL-MCNC: 7.2 G/DL (ref 6.4–8.4)
PROT UR STRIP-MCNC: ABNORMAL MG/DL
RBC # BLD AUTO: 5.31 MILLION/UL (ref 3.81–5.12)
RBC #/AREA URNS AUTO: ABNORMAL /HPF
RSV RNA RESP QL NAA+PROBE: NEGATIVE
SARS-COV-2 RNA RESP QL NAA+PROBE: NEGATIVE
SODIUM SERPL-SCNC: 135 MMOL/L (ref 135–147)
SP GR UR STRIP.AUTO: 1.04 (ref 1–1.03)
UROBILINOGEN UR STRIP-ACNC: <2 MG/DL
WBC # BLD AUTO: 8.25 THOUSAND/UL (ref 4.31–10.16)
WBC #/AREA URNS AUTO: ABNORMAL /HPF

## 2024-02-03 PROCEDURE — 76815 OB US LIMITED FETUS(S): CPT

## 2024-02-03 PROCEDURE — 99214 OFFICE O/P EST MOD 30 MIN: CPT

## 2024-02-03 PROCEDURE — 84484 ASSAY OF TROPONIN QUANT: CPT | Performed by: EMERGENCY MEDICINE

## 2024-02-03 PROCEDURE — 81001 URINALYSIS AUTO W/SCOPE: CPT | Performed by: EMERGENCY MEDICINE

## 2024-02-03 PROCEDURE — 0241U HB NFCT DS VIR RESP RNA 4 TRGT: CPT | Performed by: EMERGENCY MEDICINE

## 2024-02-03 PROCEDURE — 99284 EMERGENCY DEPT VISIT MOD MDM: CPT

## 2024-02-03 PROCEDURE — 71275 CT ANGIOGRAPHY CHEST: CPT

## 2024-02-03 PROCEDURE — 83880 ASSAY OF NATRIURETIC PEPTIDE: CPT | Performed by: EMERGENCY MEDICINE

## 2024-02-03 PROCEDURE — G1004 CDSM NDSC: HCPCS

## 2024-02-03 PROCEDURE — 85379 FIBRIN DEGRADATION QUANT: CPT | Performed by: EMERGENCY MEDICINE

## 2024-02-03 PROCEDURE — 87086 URINE CULTURE/COLONY COUNT: CPT | Performed by: EMERGENCY MEDICINE

## 2024-02-03 PROCEDURE — 83690 ASSAY OF LIPASE: CPT | Performed by: EMERGENCY MEDICINE

## 2024-02-03 PROCEDURE — 99285 EMERGENCY DEPT VISIT HI MDM: CPT | Performed by: EMERGENCY MEDICINE

## 2024-02-03 PROCEDURE — 81025 URINE PREGNANCY TEST: CPT | Performed by: EMERGENCY MEDICINE

## 2024-02-03 PROCEDURE — 80076 HEPATIC FUNCTION PANEL: CPT | Performed by: EMERGENCY MEDICINE

## 2024-02-03 PROCEDURE — 80048 BASIC METABOLIC PNL TOTAL CA: CPT | Performed by: EMERGENCY MEDICINE

## 2024-02-03 PROCEDURE — 93005 ELECTROCARDIOGRAM TRACING: CPT

## 2024-02-03 PROCEDURE — 85025 COMPLETE CBC W/AUTO DIFF WBC: CPT | Performed by: EMERGENCY MEDICINE

## 2024-02-03 PROCEDURE — 84702 CHORIONIC GONADOTROPIN TEST: CPT | Performed by: EMERGENCY MEDICINE

## 2024-02-03 PROCEDURE — 36415 COLL VENOUS BLD VENIPUNCTURE: CPT | Performed by: EMERGENCY MEDICINE

## 2024-02-03 RX ORDER — CEPHALEXIN 250 MG/1
500 CAPSULE ORAL ONCE
Status: COMPLETED | OUTPATIENT
Start: 2024-02-03 | End: 2024-02-03

## 2024-02-03 RX ORDER — CEPHALEXIN 500 MG/1
500 CAPSULE ORAL EVERY 6 HOURS SCHEDULED
Qty: 20 CAPSULE | Refills: 0 | Status: SHIPPED | OUTPATIENT
Start: 2024-02-03 | End: 2024-02-08

## 2024-02-03 RX ADMIN — IOHEXOL 60 ML: 350 INJECTION, SOLUTION INTRAVENOUS at 15:30

## 2024-02-03 RX ADMIN — CEPHALEXIN 500 MG: 250 CAPSULE ORAL at 17:19

## 2024-02-03 NOTE — PROGRESS NOTES
[facility]        NAME: Tran Wilkins is a 28 y.o. female  : 1995    MRN: [id number]  DATE: February 3, 2024  TIME: 12:48 PM    Assessment and Plan   Left lower quadrant abdominal pain [R10.32]  1. Left lower quadrant abdominal pain  Transfer to other facility        Patient's recently found out she was pregnant, LMP 2023.  Patient with left lower quadrant intermittent abdominal pain.  She denies vaginal bleeding, discharge.  Recommend patient go to emergency department for evaluation of ectopic pregnancy, versus viral illness.  Patient verbalized understanding will go to [facility] emergency department via POV with .  ADT order placed    Patient Instructions       Follow up with PCP in 3-5 days.  Proceed to  ER if symptoms worsen.    Chief Complaint     Chief Complaint   Patient presents with   • Nausea     Started with abdominal pain yesterday . Nausea and diarrhea started today. Report sob since started of pregnancy. Report 5 weeks pregnant . Was able to eat and drink .    • Vomiting         History of Present Illness       Patient is a 28-year-old female proximately 5 weeks pregnant LMP 2023, presenting with 2 days of abdominal pain, diarrhea, shortness of breath with nausea.  Patient describes the abdominal pain as sharp and intermittent.  Patient denies vaginal bleeding, vaginal discharge, falls or trauma.  Patient states that she has had nausea for several weeks, but has not vomited.  Diarrhea this morning x 6.  Patient denies taking anything for symptoms.  Patient states she has not had any OB visits for new pregnancy.        Nausea  Associated symptoms include nausea.   Vomiting   Associated symptoms include diarrhea.       Review of Systems   Review of Systems   Gastrointestinal:  Positive for diarrhea and nausea.         Current Medications       Current Outpatient Medications:   •  Plshhe-Pxuhkihzj-Faia-Fish Oil (PRENATAL + COMPLETE MULTI PO), Take by  mouth, Disp: , Rfl:   •  Skyrizi Pen 150 MG/ML SOAJ, , Disp: , Rfl:     Current Allergies     Allergies as of 02/03/2024   • (No Known Allergies)            The following portions of the patient's history were reviewed and updated as appropriate: allergies, current medications, past family history, past medical history, past social history, past surgical history and problem list.     Past Medical History:   Diagnosis Date   • Anal itching     since childhood   • Chicken pox    • COVID-19 affecting pregnancy, antepartum 02/01/2022   • Cyst of fallopian tube 2017    Right side, 11 cm, benign   • Generalized headaches    • Need for HPV vaccination     completed series 2018   • Pap smear for cervical cancer screening     5/2021-wnl   • PCB (post coital bleeding) 05/18/2021   • Postpartum atony of uterus with hemorrhage, delivered 03/12/2022   • Psoriasis    • Smoking 11/25/2019    Again advised to stop smoking discussed complication of smoking including heart disease, stroke, COPD and cancer       Past Surgical History:   Procedure Laterality Date   • NOSE SURGERY      Nasal Septum deviation repair, orly   • [place] OVARIAN CYSTECTOMY UNI/BI Right 3/1/2018    Procedure: LAPAROSCOPIC RIGHT SALPINGOCYSTECTOMY;  Surgeon: Raiza Valera MD;  Location: [facility] OR;  Service: Gynecology       Family History   Problem Relation Age of Onset   • Hyperlipidemia Mother    • Stroke Father 40        He was a smoker   • No Known Problems Brother    • Brightwood's disease Maternal Grandmother    • No Known Problems Maternal Grandfather    • No Known Problems Paternal Grandmother    • No Known Problems Paternal Grandfather    • Breast cancer Neg Hx    • Ovarian cancer Neg Hx    • Colon cancer Neg Hx    • Asthma Neg Hx    • Cancer Neg Hx    • Deep vein thrombosis Neg Hx    • Pulmonary embolism Neg Hx    • Venous thrombosis Neg Hx    • Diabetes Neg Hx    • Heart failure Neg Hx    • Hypertension Neg Hx    • Lung disease Neg Hx    • Migraines Neg  Hx    • Miscarriages / Stillbirths Neg Hx    • Seizures Neg Hx    • Thyroid disease Neg Hx    • Transient ischemic attack Neg Hx          Medications have been verified.        Objective   /71   Pulse 104   Temp (!) 97.4 °F (36.3 °C) (Temporal)   Resp 20   SpO2 98%   No LMP recorded.       Physical Exam     Physical Exam  Vitals and nursing note reviewed.   Constitutional:       General: She is not in acute distress.     Appearance: Normal appearance. She is normal weight. She is ill-appearing.   Cardiovascular:      Rate and Rhythm: Normal rate and regular rhythm.   Pulmonary:      Effort: Pulmonary effort is normal.      Breath sounds: Normal breath sounds.   Abdominal:      General: Abdomen is flat.      Palpations: Abdomen is soft.      Tenderness: There is abdominal tenderness (LLQ).   Skin:     Capillary Refill: Capillary refill takes less than 2 seconds.   Neurological:      Mental Status: She is alert.

## 2024-02-03 NOTE — DISCHARGE INSTRUCTIONS
Please make sure to get repeat HCG blood work done in 2 days and follow-up with your obstetrician.

## 2024-02-03 NOTE — ED PROVIDER NOTES
History  Chief Complaint   Patient presents with    Abdominal Pain     Patient presenting with left lower quadrant abdominal pain which started yesterday, LMP 12/30, positive home pregnancy test. Started with nausea, and frequent episodes of diarrhea this morning. Complaint of mild shortness of breath for the past 5 weeks.       History provided by:  Patient   used: No    Abdominal Pain  Associated symptoms: diarrhea, nausea and shortness of breath    Associated symptoms: no chest pain, no chills, no cough, no dysuria, no fever, no sore throat and no vomiting      Patient is a 28-year-old female presenting to emergency department with left lower quad abdominal pain, sharp in nature, started yesterday, associate with nausea.  Has had some diarrhea.  No urinary complaints.  No vaginal discharge or bleeding.  Patient is about 5 weeks pregnant.  Has not had an ultrasound yet.  Denies drugs or alcohol.  Was using hookah but stopped.  No fevers or chills.  No low back pain.    Also notes 4 months has been feeling short of breath, progressively getting worse.  No cough.  No chest pain.  Travel to [place] a month ago.      Prior to Admission Medications   Prescriptions Last Dose Informant Patient Reported? Taking?   Bwgynl-Dftscwtkn-Iqyz-Fish Oil (PRENATAL + COMPLETE MULTI PO)   Yes No   Sig: Take by mouth   Skyrizi Pen 150 MG/ML SOAJ   Yes No   Patient not taking: Reported on 2/3/2024      Facility-Administered Medications: None       Past Medical History:   Diagnosis Date    Anal itching     since childhood    Chicken pox     COVID-19 affecting pregnancy, antepartum 02/01/2022    Cyst of fallopian tube 2017    Right side, 11 cm, benign    Generalized headaches     Need for HPV vaccination     completed series 2018    Pap smear for cervical cancer screening     5/2021-wnl    PCB (post coital bleeding) 05/18/2021    Postpartum atony of uterus with hemorrhage, delivered 03/12/2022    Psoriasis     Smoking  11/25/2019    Again advised to stop smoking discussed complication of smoking including heart disease, stroke, COPD and cancer       Past Surgical History:   Procedure Laterality Date    NOSE SURGERY      Nasal Septum deviation repair, lazer    KY OVARIAN CYSTECTOMY UNI/BI Right 3/1/2018    Procedure: LAPAROSCOPIC RIGHT SALPINGOCYSTECTOMY;  Surgeon: Raiza Valera MD;  [facility];  Service: Gynecology       Family History   Problem Relation Age of Onset    Hyperlipidemia Mother     Stroke Father 40        He was a smoker    No Known Problems Brother     [place]'s disease Maternal Grandmother     No Known Problems Maternal Grandfather     No Known Problems Paternal Grandmother     No Known Problems Paternal Grandfather     Breast cancer Neg Hx     Ovarian cancer Neg Hx     Colon cancer Neg Hx     Asthma Neg Hx     Cancer Neg Hx     Deep vein thrombosis Neg Hx     Pulmonary embolism Neg Hx     Venous thrombosis Neg Hx     Diabetes Neg Hx     Heart failure Neg Hx     Hypertension Neg Hx     Lung disease Neg Hx     Migraines Neg Hx     Miscarriages / Stillbirths Neg Hx     Seizures Neg Hx     Thyroid disease Neg Hx     Transient ischemic attack Neg Hx      I have reviewed and agree with the history as documented.    E-Cigarette/Vaping    E-Cigarette Use Never User      E-Cigarette/Vaping Substances    Nicotine No     THC No     CBD No     Flavoring No     Other No     Unknown No      Social History     Tobacco Use    Smoking status: Some Days     Passive exposure: Never    Smokeless tobacco: Never    Tobacco comments:     hookah   Vaping Use    Vaping status: Never Used   Substance Use Topics    Alcohol use: Yes     Alcohol/week: 0.0 - 1.0 standard drinks of alcohol     Comment: occasional    Drug use: No       Review of Systems   Constitutional:  Negative for chills, diaphoresis and fever.   HENT:  Negative for congestion and sore throat.    Respiratory:  Positive for shortness of breath. Negative for cough,  wheezing and stridor.    Cardiovascular:  Negative for chest pain, palpitations and leg swelling.   Gastrointestinal:  Positive for abdominal pain, diarrhea and nausea. Negative for blood in stool and vomiting.   Genitourinary:  Negative for dysuria, frequency and urgency.   Musculoskeletal:  Negative for neck stiffness.   Skin:  Negative for pallor and rash.   Neurological:  Negative for dizziness, syncope, weakness, light-headedness and headaches.   All other systems reviewed and are negative.      Physical Exam  Physical Exam  Vitals reviewed.   Constitutional:       Appearance: She is well-developed.   HENT:      Head: Normocephalic and atraumatic.   Eyes:      Pupils: Pupils are equal, round, and reactive to light.   Cardiovascular:      Rate and Rhythm: Normal rate and regular rhythm.      Heart sounds: Normal heart sounds.   Pulmonary:      Effort: Pulmonary effort is normal. No respiratory distress.      Breath sounds: Normal breath sounds.   Abdominal:      General: Bowel sounds are normal.      Palpations: Abdomen is soft.      Tenderness: There is abdominal tenderness in the suprapubic area and left lower quadrant.   Musculoskeletal:         General: No swelling or tenderness.      Cervical back: Neck supple.   Skin:     General: Skin is warm and dry.      Capillary Refill: Capillary refill takes less than 2 seconds.   Neurological:      General: No focal deficit present.      Mental Status: She is alert and oriented to person, place, and time.         Vital Signs  ED Triage Vitals [02/03/24 1305]   Temperature Pulse Respirations Blood Pressure SpO2   98.1 °F (36.7 °C) (!) 108 18 118/69 96 %      Temp Source Heart Rate Source Patient Position - Orthostatic VS BP Location FiO2 (%)   Oral Monitor Sitting -- --      Pain Score       5           Vitals:    02/03/24 1305   BP: 118/69   Pulse: (!) 108   Patient Position - Orthostatic VS: Sitting         Visual Acuity      ED Medications  Medications   iohexol  (OMNIPAQUE) 350 MG/ML injection (MULTI-DOSE) 60 mL (60 mL Intravenous Given 2/3/24 1530)   cephalexin (KEFLEX) capsule 500 mg (500 mg Oral Given 2/3/24 1719)       Diagnostic Studies  Results Reviewed       Procedure Component Value Units Date/Time    FLU/RSV/COVID - if FLU/RSV clinically relevant [[id number]]  (Normal) Collected: 02/03/24 1339    Lab Status: Final result Specimen: Nares from Nose Updated: 02/03/24 1500     SARS-CoV-2 Negative     INFLUENZA A PCR Negative     INFLUENZA B PCR Negative     RSV PCR Negative    Narrative:      FOR PEDIATRIC PATIENTS - copy/paste COVID Guidelines URL to browser: [email or web address]    SARS-CoV-2 assay is a Nucleic Acid Amplification assay intended for the  qualitative detection of nucleic acid from SARS-CoV-2 in nasopharyngeal  swabs. Results are for the presumptive identification of SARS-CoV-2 RNA.    Positive results are indicative of infection with SARS-CoV-2, the virus  causing COVID-19, but do not rule out bacterial infection or co-infection  with other viruses. Laboratories within the [place] and its  territories are required to report all positive results to the appropriate  public health authorities. Negative results do not preclude SARS-CoV-2  infection and should not be used as the sole basis for treatment or other  patient management decisions. Negative results must be combined with  clinical observations, patient history, and epidemiological information.  This test has not been FDA cleared or approved.    This test has been authorized by FDA under an Emergency Use Authorization  (EUA). This test is only authorized for the duration of time the  declaration that circumstances exist justifying the authorization of the  emergency use of an in vitro diagnostic tests for detection of SARS-CoV-2  virus and/or diagnosis of COVID-19 infection under section 564(b)(1) of  the Act, 21 U.S.C. 360bbb-3(b)(1),  unless the authorization is terminated  or revoked sooner. The test has been validated but independent review by FDA  and CLIA is pending.    Test performed using [employer] GeneXpert: This RT-PCR assay targets N2,  a region unique to SARS-CoV-2. A conserved region in the E-gene was chosen  for pan-Sarbecovirus detection which includes SARS-CoV-2.    According to CMS-2020-01-R, this platform meets the definition of high-throughput technology.    Urine Microscopic [[id number]]  (Abnormal) Collected: 02/03/24 1344    Lab Status: Final result Specimen: Urine, Clean Catch Updated: 02/03/24 1426     RBC, UA None Seen /hpf      WBC, UA 20-30 /hpf      Epithelial Cells Moderate /hpf      Bacteria, UA Occasional /hpf      MUCUS THREADS Occasional    Urine culture [[id number]] Collected: 02/03/24 1344    Lab Status: In process Specimen: Urine, Clean Catch Updated: 02/03/24 1426    B-Type Natriuretic Peptide(BNP) [[id number]]  (Normal) Collected: 02/03/24 1339    Lab Status: Final result Specimen: Blood from Arm, Right Updated: 02/03/24 1422     BNP 11 pg/mL     D-Dimer [[id number]]  (Abnormal) Collected: 02/03/24 1339    Lab Status: Final result Specimen: Blood from Arm, Right Updated: 02/03/24 1416     D-Dimer, Quant 1.65 ug/ml FEU     hCG, quantitative [[id number]]  (Abnormal) Collected: 02/03/24 1339    Lab Status: Final result Specimen: Blood from Arm, Right Updated: 02/03/24 1413     HCG, Quant 1,212 mIU/mL     Narrative:       Expected Ranges:    HCG results between 5 and 25 mIU/mL may be indicative of early pregnancy but should be interpreted in light of the total clinical presentation.    HCG can rise to detectable levels in steven and post menopausal women (0-11.6 mIU/mL).     Approximate               Approximate HCG  Gestation age          Concentration ( mIU/mL)  _____________          ______________________   Weeks                      HCG values  0.2-1                       5-50  1-2                           2-3                          100-5000  3-4                         500-76804  4-5                         1000-42518  5-6                         [id number]  6-8                         25928-532876  8-12                        [id number]      HS Troponin 0hr (reflex protocol) [[id number]]  (Normal) Collected: 02/03/24 1339    Lab Status: Final result Specimen: Blood from Arm, Right Updated: 02/03/24 1412     hs TnI 0hr <2 ng/L     Basic metabolic panel [[id number]] Collected: 02/03/24 1339    Lab Status: Final result Specimen: Blood from Arm, Right Updated: 02/03/24 1407     Sodium 135 mmol/L      Potassium 3.7 mmol/L      Chloride 105 mmol/L      CO2 22 mmol/L      ANION GAP 8 mmol/L      BUN 16 mg/dL      Creatinine 0.64 mg/dL      Glucose 93 mg/dL      Calcium 8.8 mg/dL      eGFR 121 ml/min/1.73sq m     Narrative:      National Kidney Disease Foundation guidelines for Chronic Kidney Disease (CKD):     Stage 1 with normal or high GFR (GFR > 90 mL/min/1.73 square meters)    Stage 2 Mild CKD (GFR = 60-89 mL/min/1.73 square meters)    Stage 3A Moderate CKD (GFR = 45-59 mL/min/1.73 square meters)    Stage 3B Moderate CKD (GFR = 30-44 mL/min/1.73 square meters)    Stage 4 Severe CKD (GFR = 15-29 mL/min/1.73 square meters)    Stage 5 End Stage CKD (GFR <15 mL/min/1.73 square meters)  Note: GFR calculation is accurate only with a steady state creatinine    Lipase [[id number]]  (Abnormal) Collected: 02/03/24 1339    Lab Status: Final result Specimen: Blood from Arm, Right Updated: 02/03/24 1407     Lipase 9 u/L     Hepatic function panel [[id number]]  (Normal) Collected: 02/03/24 1339    Lab Status: Final result Specimen: Blood from Arm, Right Updated: 02/03/24 1407     Total Bilirubin 0.39 mg/dL      Bilirubin, Direct 0.07 mg/dL      Alkaline Phosphatase 46 U/L      AST 15 U/L      ALT 15 U/L      Total Protein 7.2 g/dL      Albumin 4.2 g/dL     UA w Reflex to Microscopic w Reflex to Culture [[id number]]  (Abnormal) Collected:  02/03/24 1344    Lab Status: Final result Specimen: Urine, Clean Catch Updated: 02/03/24 1358     Color, UA Yellow     Clarity, UA Turbid     Specific Gravity, UA 1.039     pH, UA 6.0     Leukocytes, UA Moderate     Nitrite, UA Negative     Protein, UA 30 (1+) mg/dl      Glucose, UA Negative mg/dl      Ketones, UA 20 (1+) mg/dl      Urobilinogen, UA <2.0 mg/dl      Bilirubin, UA Negative     Occult Blood, UA Negative    CBC and differential [[id number]]  (Abnormal) Collected: 02/03/24 1339    Lab Status: Final result Specimen: Blood from Arm, Right Updated: 02/03/24 1357     WBC 8.25 Thousand/uL      RBC 5.31 Million/uL      Hemoglobin 13.3 g/dL      Hematocrit 41.1 %      MCV 77 fL      MCH 25.0 pg      MCHC 32.4 g/dL      RDW 14.7 %      MPV 9.7 fL      Platelets 251 Thousands/uL      nRBC 0 /100 WBCs      Neutrophils Relative 81 %      Immat GRANS % 1 %      Lymphocytes Relative 11 %      Monocytes Relative 7 %      Eosinophils Relative 0 %      Basophils Relative 0 %      Neutrophils Absolute 6.69 Thousands/µL      Immature Grans Absolute 0.04 Thousand/uL      Lymphocytes Absolute 0.90 Thousands/µL      Monocytes Absolute 0.57 Thousand/µL      Eosinophils Absolute 0.03 Thousand/µL      Basophils Absolute 0.02 Thousands/µL     POCT pregnancy, urine [[id number]]  (Abnormal) Resulted: 02/03/24 1338    Lab Status: Final result Updated: 02/03/24 1338     EXT Preg Test, Ur Positive     Control Valid                   CTA ED chest PE study   Final Result by Alexandra Aguilar MD (02/03 1621)      No pulmonary embolus.      No acute pulmonary disease.               Workstation performed: [id number]         US OB pregnancy limited with transvaginal   Final Result by Gloria Robison MD (02/03 1645)   Small 3 mm cystic structure with a thickened rim within the endometrium, likely representing an intrauterine gestational sac. No yolk sac or fetal pole identified. No suspicious adnexal mass identified. This finding  likely represents an early    intrauterine pregnancy. Correlate with serial quantitative BHCG.            Workstation performed: [id number]                    Procedures  Procedures         ED Course  ED Course as of 02/03/24 2104   Sat Feb 03, 2024   1356 ECG shows rate of 95, sinus, normal axis, normal QRS, no significant ST or T wave changes, normal intervals, no significant changes from previous EKG, independently interpreted by me   1424 Patient's D-dimer is 1.65.  Will get CAT scan to eval for PE.  Discussed risks and benefit patient.  Patient understands and is agreeable with plan.                       PERC Rule for PE      Flowsheet Row Most Recent Value   PERC Rule for PE    Age >=50 0 Filed at: 02/03/2024 1419   HR >=100 1 Filed at: 02/03/2024 1419   O2 Sat on room air < 95% 0 Filed at: 02/03/2024 1419   History of PE or DVT 0 Filed at: 02/03/2024 1419   Recent trauma or surgery 0 Filed at: 02/03/2024 1419   Hemoptysis 0 Filed at: 02/03/2024 1419   Exogenous estrogen 1 Filed at: 02/03/2024 1419   Unilateral leg swelling 0 Filed at: 02/03/2024 1419   PERC Rule for PE Results 2 Filed at: 02/03/2024 1419                SBIRT 22yo+      Flowsheet Row Most Recent Value   Initial Alcohol Screen: US AUDIT-C     1. How often do you have a drink containing alcohol? 0 Filed at: 02/03/2024 1522   2. How many drinks containing alcohol do you have on a typical day you are drinking?  0 Filed at: 02/03/2024 1522   3a. Male UNDER 65: How often do you have five or more drinks on one occasion? 0 Filed at: 02/03/2024 1522   3b. FEMALE Any Age, or MALE 65+: How often do you have 4 or more drinks on one occassion? 0 Filed at: 02/03/2024 1522   Audit-C Score 0 Filed at: 02/03/2024 1522   CATIA: How many times in the past year have you...    Used an illegal drug or used a prescription medication for non-medical reasons? Never Filed at: 02/03/2024 1522            Wells' Criteria for PE      Flowsheet Row Most Recent Value   Wells'  Criteria for PE    Clinical signs and symptoms of DVT 0 Filed at: 02/03/2024 1419   PE is primary diagnosis or equally likely 0 Filed at: 02/03/2024 1419   HR >100 1.5 Filed at: 02/03/2024 1419   Immobilization at least 3 days or Surgery in the previous 4 weeks 0 Filed at: 02/03/2024 1419   Previous, objectively diagnosed PE or DVT 0 Filed at: 02/03/2024 1419   Hemoptysis 0 Filed at: 02/03/2024 1419   Malignancy with treatment within 6 months or palliative 0 Filed at: 02/03/2024 1419   Wells' Criteria Total 1.5 Filed at: 02/03/2024 1419                  Medical Decision Making  28-year-old with left lower quad abdominal pain, differential includes gastroenteritis viral syndrome, ovarian cyst, torsion, ectopic pregnancy, intrauterine pregnancy.  Will get ultrasound, hCG, check urine, check electrolytes and CBC.    Regarding the shortness of breath will evaluate for PE as patient is pregnant with recent travel.  Will check D-dimer.    Discussed results with patient.  She understands importance of following up and getting repeat hCG as outpatient in 2 days.  Also verbalized understanding of the importance of returning if having lightheaded, dizzy, short of breath, chest pain, vaginal bleeding or abdominal pain    Discussed with OB who recommended the repeat hCG and ordered lab for office to follow-up    Amount and/or Complexity of Data Reviewed  Labs: ordered.  Radiology: ordered.    Risk  Prescription drug management.             Disposition  Final diagnoses:   First trimester pregnancy   Bacteriuria in pregnancy     Time reflects when diagnosis was documented in both MDM as applicable and the Disposition within this note       Time User Action Codes Description Comment    2/3/2024  5:09 PM Nicole Mills Add [Z34.91] First trimester pregnancy     2/3/2024  5:10 PM Nicole Mills Add [O99.891,  R82.71] Bacteriuria in pregnancy     2/3/2024  5:10 PM Rica Zhao Add [O36.80X0] Pregnancy of unknown anatomic  location           ED Disposition       ED Disposition   Discharge    Condition   Stable    Date/Time   Sat Feb 3, 2024 1709    Comment   Pravinkailash Wilkins discharge to home/self care.                   Follow-up Information       Follow up With Specialties Details Why Contact Info Additional Information    Raiza Valera MD Obstetrics and Gynecology, Obstetrics, Gynecology Follow up Please call the office to schedule follow-up. [address]  [facility] [id number] [phone]       [facility] Emergency Department Emergency Medicine  As needed, If symptoms worsen [facility] [facility] [address]  [phone] [facility] Emergency Department, [facility] [place], [place], [address]            Discharge Medication List as of 2/3/2024  5:11 PM        START taking these medications    Details   cephalexin (KEFLEX) 500 mg capsule Take 1 capsule (500 mg total) by mouth every 6 (six) hours for 5 days, Starting Sat 2/3/2024, Until Thu 2/8/2024, Normal           CONTINUE these medications which have NOT CHANGED    Details   Urxuzb-Fopcgmluu-Hhcv-Fish Oil (PRENATAL + COMPLETE MULTI PO) Take by mouth, Historical Med      Skyrizi Pen 150 MG/ML SOAJ Starting Tue 4/11/2023, Historical Med             Outpatient Discharge Orders   hCG, quantitative   Standing Status: Future Standing Exp. Date: 02/03/25       PDMP Review       None            ED Provider  Electronically Signed by             Nicole Mills MD  02/03/24 2103       Nicole Mills MD  02/03/24 2104

## 2024-02-04 LAB
ATRIAL RATE: 95 BPM
P AXIS: 37 DEGREES
PR INTERVAL: 166 MS
QRS AXIS: 7 DEGREES
QRSD INTERVAL: 88 MS
QT INTERVAL: 384 MS
QTC INTERVAL: 482 MS
T WAVE AXIS: 30 DEGREES
VENTRICULAR RATE: 95 BPM

## 2024-02-05 ENCOUNTER — APPOINTMENT (OUTPATIENT)
Dept: LAB | Age: 29
End: 2024-02-05
Payer: COMMERCIAL

## 2024-02-05 ENCOUNTER — TELEPHONE (OUTPATIENT)
Dept: OBGYN CLINIC | Facility: CLINIC | Age: 29
End: 2024-02-05

## 2024-02-05 DIAGNOSIS — Z34.91 FIRST TRIMESTER PREGNANCY: ICD-10-CM

## 2024-02-05 DIAGNOSIS — O36.80X0 PREGNANCY OF UNKNOWN ANATOMIC LOCATION: ICD-10-CM

## 2024-02-05 LAB
B-HCG SERPL-ACNC: 2242 MIU/ML (ref 0–5)
BACTERIA UR CULT: NORMAL

## 2024-02-05 PROCEDURE — 84702 CHORIONIC GONADOTROPIN TEST: CPT

## 2024-02-05 PROCEDURE — 36415 COLL VENOUS BLD VENIPUNCTURE: CPT

## 2024-02-05 NOTE — TELEPHONE ENCOUNTER
"----- Message from Raiza Valera MD sent at 2/5/2024 11:46 AM EST -----  Regarding: FW: Early PUL  Please contact patient and make sure she has her hcg done today.  We will also need to schedule an u/s once we have a better idea of what her hcg is doing      ----- Message -----  From: Rica Zhao MD  Sent: 2/3/2024   5:09 PM EST  To: Raiza Valera MD  Subject: Early PUL                                        Hello! This patient came to [facility] ED today with LLQ pain, nausea, diarrhea. She also noted some increased SOB, but cardiac work-up was neg and sats were normal.    Bhcg today 1212, TVUS w/ \"Small 3 mm cystic structure with a thickened rim within the endometrium, likely representing an intrauterine gestational sac. No yolk sac or fetal pole identified. No suspicious adnexal mass identified. This finding likely represents an early  intrauterine pregnancy.\"    I'm ordering another bhcg to be completed in 48h; I wanted your office to be aware.    Thanks!  Rica"

## 2024-02-07 ENCOUNTER — OFFICE VISIT (OUTPATIENT)
Dept: OBGYN CLINIC | Facility: CLINIC | Age: 29
End: 2024-02-07
Payer: COMMERCIAL

## 2024-02-07 VITALS
HEIGHT: 72 IN | BODY MASS INDEX: 27.58 KG/M2 | SYSTOLIC BLOOD PRESSURE: 122 MMHG | WEIGHT: 203.6 LBS | DIASTOLIC BLOOD PRESSURE: 74 MMHG

## 2024-02-07 DIAGNOSIS — O36.80X0 PREGNANCY WITH UNCERTAIN FETAL VIABILITY, SINGLE OR UNSPECIFIED FETUS: Primary | ICD-10-CM

## 2024-02-07 PROCEDURE — 99214 OFFICE O/P EST MOD 30 MIN: CPT | Performed by: NURSE PRACTITIONER

## 2024-02-07 NOTE — PROGRESS NOTES
Assessment/Plan:    1. Pregnancy with uncertain fetal viability, single or unspecified fetus  29 yo  at 5w4d based upon LMP.  Explained ED labs and US findings.  Explained that we need to follow her HCG levels and another US to determine if the pregnancy is healthy.  Repeat HCG quant today and Friday.  Plan follow up US on 24.  Advised to call or go to ED after hours if bleeding is heavy and or having increasing pain.    - hCG, quantitative; Future  - hCG, quantitative; Future    I have spent a total time of 35 minutes on 24 in caring for this patient including Diagnostic results, Instructions for management, Impressions, Counseling / Coordination of care, Documenting in the medical record, Reviewing / ordering tests, medicine, procedures  , and Obtaining or reviewing history  .  Review of ED visit and labs, 10 minutes.    Subjective:      Patient ID: Tran Wilkins is a 28 y.o. female.    HPI  29 yo  presents for follow up post ED visit.    Seen in ED on 2/3/24 w/ c/o LLQ abdominal pain starting on 24.  Her LMP was 1230 and she had a known positive home pregnancy test prior to the ED visit.    ED Labs:  HCG Quant 1,212; A +  BMP normal; lipase low; hepatic function normal; D-dimer ^ 1.65; HS troponin <2  UA abnormal- treated with keflex; culture mixed contaminants  Pelvic US: small cystic structure, 3 mm, likely IU gestational sac; no yolk sac or fetal pole.  Dr. Valera ordered a rpt Quant hcg for 24 which was 2,242.    Reports seeing a speck of blood just before today's appt.  Mild cramping.  + nausea    3/2018 right salpingocystectomy    The following portions of the patient's history were reviewed and updated as appropriate: allergies, current medications, past family history, past medical history, past social history, past surgical history, and problem list.    Review of Systems   Constitutional:  Negative for chills and fever.   Genitourinary:  Positive for menstrual  "problem (absent) and vaginal bleeding (speck of blood). Negative for difficulty urinating, dysuria, frequency, genital sores, pelvic pain, urgency and vaginal discharge.         Objective:    /74 (BP Location: Right arm, Patient Position: Sitting, Cuff Size: Standard)   Ht 6' 5\" (1.956 m)   Wt 92.4 kg (203 lb 9.6 oz)   LMP 12/30/2023   BMI 24.14 kg/m²      Physical Exam  Constitutional:       General: She is not in acute distress.     Appearance: Normal appearance. She is well-developed and normal weight. She is not ill-appearing or diaphoretic.   HENT:      Head: Normocephalic and atraumatic.   Eyes:      Pupils: Pupils are equal, round, and reactive to light.   Pulmonary:      Effort: Pulmonary effort is normal.   Abdominal:      General: Abdomen is flat.      Palpations: Abdomen is soft.   Genitourinary:     General: Normal vulva.      Exam position: Lithotomy position.      Labia:         Right: No rash, tenderness, lesion or injury.         Left: No rash, tenderness, lesion or injury.       Vagina: No signs of injury and foreign body. Bleeding (tiny speck of blood noted in vaginal discharge) present. No vaginal discharge, erythema or tenderness.      Cervix: No cervical motion tenderness, discharge or friability.      Uterus: Not enlarged and not tender.       Adnexa:         Right: No mass or tenderness.          Left: No mass or tenderness.        Comments: Cervix long and closed  Skin:     General: Skin is warm and dry.   Neurological:      General: No focal deficit present.      Mental Status: She is alert and oriented to person, place, and time.   Psychiatric:         Mood and Affect: Mood normal.         Behavior: Behavior normal.         Thought Content: Thought content normal.         Judgment: Judgment normal.           "

## 2024-02-07 NOTE — PATIENT INSTRUCTIONS
FOR NAUSEA/ VOMITING    Eat every 2 hours, around the clock-- small amounts and foods that do not cause you gastrointestinal distress.      Sips of fluids in between eating-- very cold liquids, such as ginger ale or lemonade.      Italian ice, ice pops, jello gelatin-- these feel good because they are cold and give you fluids/calories.    Take Unisom 1/2 tablet with vit B6 25 mg three times a day-- You can get these over the counter at any store, such as [employer].    If you are no better or getting worse, let us know on Monday and I will send a prescription for a medication to your pharmacy.

## 2024-02-12 ENCOUNTER — ULTRASOUND (OUTPATIENT)
Dept: OBGYN CLINIC | Facility: CLINIC | Age: 29
End: 2024-02-12
Payer: COMMERCIAL

## 2024-02-12 DIAGNOSIS — O36.80X0 ENCOUNTER TO DETERMINE FETAL VIABILITY OF PREGNANCY, SINGLE OR UNSPECIFIED FETUS: Primary | ICD-10-CM

## 2024-02-12 PROCEDURE — 76817 TRANSVAGINAL US OBSTETRIC: CPT | Performed by: OBSTETRICS & GYNECOLOGY

## 2024-02-12 NOTE — PROGRESS NOTES
Procedures    Serial transvaginal images were obtained through the gravid maternal uterus. The patient's LMP was 12/30/2023 with an EMMANUEL of  10/05/2024 and a gestational age of  6w2d (based on LMP).   The indication for today's examination is to confirm fetal size and viability with left lower quadrant pain.    CRL=  3.0mm      5w6d     EMMANUEL by US 10/8/2024  YS=  1.4mm  FHR=  109bpm    The uterus appears within normal limits. The right ovary demonstrates a 4.2cm cyst with septation. The left ovary is not seen.     Uterus= 8.63 x 5.00 x 6.68cm  Rt Ovary= 5.76 x 3.18 x 3.21cm  Lt. Ovary= Not seen    Impression: Single, viable IUP. Right ovarian cyst.    Reba Moffett RDMS    GE RIC5-9A-RS transvaginal transducer Serial #[id number] was used to perform the examination today and subsequently followed with high level disinfection utilizing Trophon EPR procedure.    [facility]  [address] [place] [address]  [address] [place] [id number]  Phone  [phone]  Fax  [phone]

## 2024-02-12 NOTE — Clinical Note
Please notify patient of EDC of 10/5/2024 as she was inquiring She reports she was advised we do not take her insurance and is changing it. If she was able to change it, please schedule ob intake

## 2024-02-19 ENCOUNTER — TELEPHONE (OUTPATIENT)
Dept: OBGYN CLINIC | Facility: CLINIC | Age: 29
End: 2024-02-19

## 2024-02-19 NOTE — TELEPHONE ENCOUNTER
Spoke to patient.  States her insurance does not participate with [facility].  Patient transferred to [facility] until her open enrollment when she can change insurance.

## 2024-02-21 PROBLEM — Z01.419 ENCOUNTER FOR ANNUAL ROUTINE GYNECOLOGICAL EXAMINATION: Status: RESOLVED | Noted: 2022-08-05 | Resolved: 2024-02-21

## 2024-04-01 ENCOUNTER — TELEPHONE (OUTPATIENT)
Age: 29
End: 2024-04-01

## 2024-04-01 NOTE — TELEPHONE ENCOUNTER
[facility]. Stated pt was told to complete CBC and differential lab due to last one being abnormal. No order was put in for pt. Pt stated someone from office called and instructed pt to complete. Lab was not resulted by a provider in [facility]. Made pt aware of message sent to clinical staff.

## 2024-08-05 ENCOUNTER — OFFICE VISIT (OUTPATIENT)
Dept: FAMILY MEDICINE CLINIC | Facility: CLINIC | Age: 29
End: 2024-08-05
Payer: COMMERCIAL

## 2024-08-05 VITALS
DIASTOLIC BLOOD PRESSURE: 80 MMHG | OXYGEN SATURATION: 97 % | HEART RATE: 82 BPM | SYSTOLIC BLOOD PRESSURE: 110 MMHG | WEIGHT: 211 LBS | HEIGHT: 66 IN | BODY MASS INDEX: 33.91 KG/M2 | TEMPERATURE: 98.8 F

## 2024-08-05 DIAGNOSIS — Z13.29 SCREENING FOR THYROID DISORDER: ICD-10-CM

## 2024-08-05 DIAGNOSIS — F17.290 HOOKAH PIPE SMOKER: ICD-10-CM

## 2024-08-05 DIAGNOSIS — G89.29 CHRONIC LEFT-SIDED LOW BACK PAIN WITHOUT SCIATICA: ICD-10-CM

## 2024-08-05 DIAGNOSIS — Z13.220 SCREENING, LIPID: ICD-10-CM

## 2024-08-05 DIAGNOSIS — M54.50 CHRONIC LEFT-SIDED LOW BACK PAIN WITHOUT SCIATICA: ICD-10-CM

## 2024-08-05 DIAGNOSIS — Z00.01 ENCOUNTER FOR WELL ADULT EXAM WITH ABNORMAL FINDINGS: Primary | ICD-10-CM

## 2024-08-05 DIAGNOSIS — R09.81 SINUS CONGESTION: ICD-10-CM

## 2024-08-05 DIAGNOSIS — Z12.4 CERVICAL CANCER SCREENING: ICD-10-CM

## 2024-08-05 DIAGNOSIS — E66.9 OBESITY (BMI 30-39.9): ICD-10-CM

## 2024-08-05 PROCEDURE — 99214 OFFICE O/P EST MOD 30 MIN: CPT | Performed by: FAMILY MEDICINE

## 2024-08-05 PROCEDURE — 99395 PREV VISIT EST AGE 18-39: CPT | Performed by: FAMILY MEDICINE

## 2024-08-05 RX ORDER — FLUTICASONE PROPIONATE 50 MCG
2 SPRAY, SUSPENSION (ML) NASAL DAILY
Qty: 16 G | Refills: 1 | Status: SHIPPED | OUTPATIENT
Start: 2024-08-05

## 2024-08-05 NOTE — PROGRESS NOTES
Adult Annual Physical  Name: Tran Wilkins      : 1995      MRN: [id number]  Encounter Provider: Laurie Downing MD  Encounter Date: 2024   Encounter department: [facility]    Assessment & Plan   1. Encounter for well adult exam with abnormal findings  Assessment & Plan:  Advice and education were given regarding nutrition, aerobic exercises, weight-bearing exercises, cardiovascular risk reduction, fall risk reduction, and age-appropriate supplements.     The patient was counseled regarding instructions for management, risk factor reductions, prognosis, risks and benefits of treatment options, patient and family education, and importance of compliance with treatment.  Patient follow-up with a GYN for her woman health  Orders:  -     Basic metabolic panel; Future  -     Comprehensive metabolic panel; Future  -     Ferritin; Future  -     Iron; Future  2. Obesity (BMI 30-39.9)  Assessment & Plan:  Chronic uncontrol BMI 34.0 discussed portion control low-carb low-fat diet increase physical activity  Orders:  -     Basic metabolic panel; Future  -     Comprehensive metabolic panel; Future  -     Ferritin; Future  -     Iron; Future  3. Cervical cancer screening  Assessment & Plan:  Refer the patient to see her GYN for Pap smear  Orders:  -     Basic metabolic panel; Future  -     Comprehensive metabolic panel; Future  -     Ferritin; Future  -     Iron; Future  4. Hookah pipe smoker  Assessment & Plan:  Patient smokes hookah sometimes aware of the complication of smoking  5. Sinus congestion  Assessment & Plan:  Acute symptomatic recommended Flonase nasal spray 2 sprays nostril once a day proper use and possible side effect review  Orders:  -     fluticasone (FLONASE) 50 mcg/act nasal spray; 2 sprays into each nostril daily  -     Basic metabolic panel; Future  -     Comprehensive metabolic panel; Future  -     Ferritin; Future  -     Iron; Future  6. Chronic  left-sided low back pain without sciatica  Assessment & Plan:  New diagnosis of chronic pain has been going on for more than 3 months no fall or trauma no red flag symptoms recommend Tylenol for the pain plan for x-ray of the lumbar spine  Orders:  -     Basic metabolic panel; Future  -     Comprehensive metabolic panel; Future  -     Ferritin; Future  -     Iron; Future  -     XR spine lumbar minimum 4 views non injury; Future; Expected date: 08/05/2024  7. Screening, lipid  -     Lipid Panel with Direct LDL reflex; Future  -     Ferritin; Future  -     Iron; Future  8. Screening for thyroid disorder  -     TSH, 3rd generation with Free T4 reflex; Future  -     Ferritin; Future  -     Iron; Future    Immunizations and preventive care screenings were discussed with patient today. Appropriate education was printed on patient's after visit summary.    Counseling:  Alcohol/drug use: discussed moderation in alcohol intake, the recommendations for healthy alcohol use, and avoidance of illicit drug use.  Dental Health: discussed importance of regular tooth brushing, flossing, and dental visits.  Injury prevention: discussed safety/seat belts, safety helmets, smoke detectors, carbon dioxide detectors, and smoking near bedding or upholstery.  Sexual health: discussed sexually transmitted diseases, partner selection, use of condoms, avoidance of unintended pregnancy, and contraceptive alternatives.  Exercise: the importance of regular exercise/physical activity was discussed. Recommend exercise 3-5 times per week for at least 30 minutes.       Depression Screening and Follow-up Plan: Patient was screened for depression during today's encounter. They screened negative with a PHQ-2 score of 0.    Tobacco Cessation Counseling: Tobacco cessation counseling was provided. The patient is sincerely urged to quit consumption of tobacco. She is not ready to quit tobacco.         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     Diet and Physical Activity:    - Exercise: no formal exercise.    Depression Screening:  - PHQ-2 Score: 0    General Health:  - Sleep: sleeps well.  - Hearing: normal hearing bilateral ears.  - Vision: no vision problems.  - Dental: regular dental visits.    /GYN Health:  - Follows with GYN: yes.   - Menopause: premenopausal.     Review of Systems   Constitutional:  Negative for chills and fever.   HENT:  Positive for congestion and postnasal drip. Negative for ear pain and sore throat.    Eyes:  Negative for pain and visual disturbance.   Respiratory:  Negative for cough and shortness of breath.    Cardiovascular:  Negative for chest pain and palpitations.   Gastrointestinal:  Negative for abdominal pain, constipation, diarrhea and vomiting.   Genitourinary:  Negative for dysuria and hematuria.   Musculoskeletal:  Positive for back pain. Negative for gait problem.   Skin:  Negative for color change and rash.   Neurological:  Negative for seizures and syncope.   All other systems reviewed and are negative.        Objective     /80 (BP Location: Left arm, Patient Position: Sitting)   Pulse 82   Temp 98.8 °F (37.1 °C) (Tympanic)   Ht 5' 6\" (1.676 m)   Wt 95.7 kg (211 lb)   LMP 08/03/2024 (Exact Date)   SpO2 97%   Breastfeeding Unknown   BMI 34.06 kg/m²     Physical Exam  Vitals and nursing note reviewed.   Constitutional:       General: She is not in acute distress.     Appearance: She is " well-developed. She is not diaphoretic.   HENT:      Head: Normocephalic.      Right Ear: Tympanic membrane and external ear normal.      Left Ear: Tympanic membrane and external ear normal.      Nose: Congestion present. No rhinorrhea.      Mouth/Throat:      Pharynx: No posterior oropharyngeal erythema.   Eyes:      General:         Right eye: No discharge.         Left eye: No discharge.      Conjunctiva/sclera: Conjunctivae normal.   Neck:      Vascular: No JVD.   Cardiovascular:      Rate and Rhythm: Normal rate and regular rhythm.      Heart sounds: Normal heart sounds. No murmur heard.     No gallop.   Pulmonary:      Effort: Pulmonary effort is normal. No respiratory distress.      Breath sounds: Normal breath sounds. No stridor. No wheezing or rales.   Chest:      Chest wall: No tenderness.   Abdominal:      General: There is no distension.      Palpations: Abdomen is soft. There is no mass.      Tenderness: There is no abdominal tenderness. There is no rebound.   Musculoskeletal:      Cervical back: Normal range of motion and neck supple.      Lumbar back: Tenderness present. No bony tenderness. Normal range of motion. Positive left straight leg raise test. Negative right straight leg raise test.   Lymphadenopathy:      Cervical: No cervical adenopathy.   Skin:     General: Skin is warm.      Findings: No erythema or rash.   Neurological:      Mental Status: She is alert and oriented to person, place, and time.

## 2024-08-06 PROBLEM — G89.29 CHRONIC LEFT-SIDED LOW BACK PAIN WITHOUT SCIATICA: Status: ACTIVE | Noted: 2024-08-06

## 2024-08-06 PROBLEM — F17.290 HOOKAH PIPE SMOKER: Status: ACTIVE | Noted: 2024-08-06

## 2024-08-06 PROBLEM — R93.5 ABNORMAL ULTRASOUND OF UTERUS: Status: RESOLVED | Noted: 2021-06-04 | Resolved: 2024-08-06

## 2024-08-06 PROBLEM — Z12.4 CERVICAL CANCER SCREENING: Status: ACTIVE | Noted: 2024-08-06

## 2024-08-06 PROBLEM — E66.9 OBESITY (BMI 30-39.9): Status: ACTIVE | Noted: 2022-08-05

## 2024-08-06 PROBLEM — M54.50 CHRONIC LEFT-SIDED LOW BACK PAIN WITHOUT SCIATICA: Status: ACTIVE | Noted: 2024-08-06

## 2024-08-06 NOTE — ASSESSMENT & PLAN NOTE
New diagnosis of chronic pain has been going on for more than 3 months no fall or trauma no red flag symptoms recommend Tylenol for the pain plan for x-ray of the lumbar spine

## 2024-08-06 NOTE — ASSESSMENT & PLAN NOTE
Acute symptomatic recommended Flonase nasal spray 2 sprays nostril once a day proper use and possible side effect review

## 2024-08-06 NOTE — ASSESSMENT & PLAN NOTE
Chronic uncontrol BMI 34.0 discussed portion control low-carb low-fat diet increase physical activity

## 2024-08-06 NOTE — ASSESSMENT & PLAN NOTE
Advice and education were given regarding nutrition, aerobic exercises, weight-bearing exercises, cardiovascular risk reduction, fall risk reduction, and age-appropriate supplements.     The patient was counseled regarding instructions for management, risk factor reductions, prognosis, risks and benefits of treatment options, patient and family education, and importance of compliance with treatment.  Patient follow-up with a GYN for her woman health

## 2024-08-07 ENCOUNTER — TELEPHONE (OUTPATIENT)
Age: 29
End: 2024-08-07

## 2024-08-07 LAB
ALBUMIN SERPL-MCNC: 4 G/DL (ref 3.5–5.7)
ALP SERPL-CCNC: 49 U/L (ref 35–120)
ALT SERPL-CCNC: 14 U/L
ANION GAP SERPL CALCULATED.3IONS-SCNC: 10 MMOL/L (ref 3–11)
AST SERPL-CCNC: 12 U/L
BILIRUB SERPL-MCNC: 0.4 MG/DL (ref 0.2–1)
BUN SERPL-MCNC: 13 MG/DL (ref 7–25)
CALCIUM SERPL-MCNC: 9 MG/DL (ref 8.5–10.1)
CHLORIDE SERPL-SCNC: 104 MMOL/L (ref 100–109)
CHOLEST SERPL-MCNC: 168 MG/DL
CHOLEST/HDLC SERPL: 3.1 {RATIO}
CO2 SERPL-SCNC: 25 MMOL/L (ref 21–31)
CREAT SERPL-MCNC: 0.64 MG/DL (ref 0.4–1.1)
CYTOLOGY CMNT CVX/VAG CYTO-IMP: NORMAL
FERRITIN SERPL-MCNC: 8 NG/ML (ref 11–306.8)
GFR/BSA.PRED SERPLBLD CYS-BASED-ARV: 123 ML/MIN/{1.73_M2}
GLUCOSE SERPL-MCNC: 78 MG/DL (ref 65–99)
HDLC SERPL-MCNC: 54 MG/DL (ref 23–92)
IRON SERPL-MCNC: 47 UG/DL (ref 50–212)
LDLC SERPL CALC-MCNC: 93 MG/DL
NONHDLC SERPL-MCNC: 114 MG/DL
POTASSIUM SERPL-SCNC: 4.4 MMOL/L (ref 3.5–5.2)
PROT SERPL-MCNC: 6.5 G/DL (ref 6.3–8.3)
SODIUM SERPL-SCNC: 139 MMOL/L (ref 135–145)
TRIGL SERPL-MCNC: 103 MG/DL

## 2024-08-07 NOTE — TELEPHONE ENCOUNTER
Harris called from [facility] asking if the office can fax over the xray orders to [phone]. Reached out the office they will fax over orders

## 2024-08-09 ENCOUNTER — TELEPHONE (OUTPATIENT)
Dept: FAMILY MEDICINE CLINIC | Facility: CLINIC | Age: 29
End: 2024-08-09

## 2024-08-09 DIAGNOSIS — M47.9 SPONDYLOSIS: Primary | ICD-10-CM

## 2024-08-09 DIAGNOSIS — D50.9 IRON DEFICIENCY ANEMIA, UNSPECIFIED IRON DEFICIENCY ANEMIA TYPE: Primary | ICD-10-CM

## 2024-08-09 RX ORDER — FERROUS SULFATE 325(65) MG
325 TABLET ORAL
Qty: 30 TABLET | Refills: 0 | Status: SHIPPED | OUTPATIENT
Start: 2024-08-09

## 2024-08-09 NOTE — TELEPHONE ENCOUNTER
Pt called A.  Relayed results to (patient/patient representative as listed on communication consent form) as per provider message.   Xray showed Spondylosis   Recommend PT ,Motrin 400 mg po for pain prn  To order HLA-B 27 blood work   Physical therapy orders placed.   Blood work order placed for patient.      Patient/Patient Representative expressed understanding and had the following question(s): Re: lab work, should pt have this done in 8 weeks?  In addition, would like to speak to Dr. Downing directly, advise on how long she should wait on becoming pregnant.  Please advise.     B. Route to OFFICE CLINICAL POOL for follow-up with provider.

## 2024-08-09 NOTE — TELEPHONE ENCOUNTER
----- Message from Laurie Downing MD sent at 8/9/2024 11:49 AM EDT -----  Xray showed Spondylosis   Recommend PT ,Motrin 400 mg po for pain prn  To order HLA-B 27 blood work

## 2024-08-09 NOTE — TELEPHONE ENCOUNTER
Left message for patient to return call to the office.    Physical therapy orders placed.     Blood work order placed for patient.

## 2024-08-09 NOTE — TELEPHONE ENCOUNTER
Left message to return call to the office for results.     Lab orders placed for patient to do in 8 weeks.     Medication sent to provider for approval.

## 2024-08-20 ENCOUNTER — TELEPHONE (OUTPATIENT)
Age: 29
End: 2024-08-20

## 2024-08-20 NOTE — TELEPHONE ENCOUNTER
Pt going to  PT and is not able to view the physical therapy order in [employer]. Could we please make the order visible or send her a [employer] message with an attachment to open the order? She currently is unable to view it in [employer]. Please call pt back

## 2024-08-21 NOTE — TELEPHONE ENCOUNTER
[place] for pt to let her know I emailed referral to [email or web address], which was on file on [employer]. If pt did not get it, prestonk and I can send it again. I could also fax it to the  PT office pt will be going to.

## 2024-08-23 DIAGNOSIS — D50.9 IRON DEFICIENCY ANEMIA, UNSPECIFIED IRON DEFICIENCY ANEMIA TYPE: ICD-10-CM

## 2024-08-23 RX ORDER — FERROUS SULFATE 325(65) MG
1 TABLET ORAL
Qty: 30 TABLET | Refills: 0 | Status: SHIPPED | OUTPATIENT
Start: 2024-08-23

## 2024-08-27 DIAGNOSIS — R09.81 SINUS CONGESTION: ICD-10-CM

## 2024-08-28 RX ORDER — FLUTICASONE PROPIONATE 50 MCG
SPRAY, SUSPENSION (ML) NASAL
Qty: 48 ML | Refills: 1 | Status: SHIPPED | OUTPATIENT
Start: 2024-08-28

## 2024-09-05 PROBLEM — Z12.4 CERVICAL CANCER SCREENING: Status: RESOLVED | Noted: 2024-08-06 | Resolved: 2024-09-05

## 2024-10-08 LAB
BASOPHILS # BLD AUTO: 0 THOU/CMM (ref 0–0.1)
BASOPHILS NFR BLD AUTO: 0 %
DIFFERENTIAL METHOD BLD: ABNORMAL
EOSINOPHIL # BLD AUTO: 0.1 THOU/CMM (ref 0–0.5)
EOSINOPHIL NFR BLD AUTO: 1 %
ERYTHROCYTE [DISTWIDTH] IN BLOOD BY AUTOMATED COUNT: 15.3 % (ref 12–16)
FERRITIN SERPL-MCNC: 20 NG/ML (ref 11–306.8)
GLUCOSE 1H P CHAL SERPL-MCNC: 144 MG/DL
GLUCOSE 2H P CHAL SERPL-MCNC: 149 MG/DL
GLUCOSE 3H P 100 G GLC PO SERPL-MCNC: 101 MG/DL
HCT VFR BLD AUTO: 35.1 % (ref 35–43)
HGB BLD-MCNC: 11.8 G/DL (ref 11.5–14.5)
IRON SATN MFR SERPL: 16 % (ref 20–50)
IRON SERPL-MCNC: 69 UG/DL (ref 50–212)
LYMPHOCYTES # BLD AUTO: 2 THOU/CMM (ref 1–3)
LYMPHOCYTES NFR BLD AUTO: 19 %
MCH RBC QN AUTO: 26.1 PG (ref 26–34)
MCHC RBC AUTO-ENTMCNC: 33.7 G/DL (ref 32–37)
MCV RBC AUTO: 78 FL (ref 80–100)
MONOCYTES # BLD AUTO: 0.5 THOU/CMM (ref 0.3–1)
MONOCYTES NFR BLD AUTO: 5 %
NEUTROPHILS # BLD AUTO: 8.1 THOU/CMM (ref 1.8–7.8)
NEUTROPHILS NFR BLD AUTO: 75 %
PLATELET # BLD AUTO: 218 THOU/CMM (ref 140–350)
PMV BLD REES-ECKER: 8.8 FL (ref 7.5–11.3)
RBC # BLD AUTO: 4.53 MILL/CMM (ref 3.7–4.7)
TIBC SERPL-MCNC: 441 UG/DL (ref 260–430)
TRANSFERRIN SERPL-MCNC: 315 MG/DL (ref 203–362)
WBC # BLD AUTO: 10.8 THOU/CMM (ref 4–10)

## 2024-10-09 LAB
COLLECT DATE: NORMAL
HGB FRACT BLD-IMP: NORMAL
HLA-B27 QL: NEGATIVE
SPECIMEN SOURCE: NORMAL
TEST METHOD: NORMAL

## 2024-10-10 ENCOUNTER — TELEPHONE (OUTPATIENT)
Dept: FAMILY MEDICINE CLINIC | Facility: CLINIC | Age: 29
End: 2024-10-10

## 2024-10-10 NOTE — TELEPHONE ENCOUNTER
----- Message from Laurie Downing MD sent at 10/8/2024  8:16 PM EDT -----  Improve in iron  to stop Ferrous sulfate supplement

## 2024-12-17 ENCOUNTER — OFFICE VISIT (OUTPATIENT)
Dept: FAMILY MEDICINE CLINIC | Facility: CLINIC | Age: 29
End: 2024-12-17
Payer: COMMERCIAL

## 2024-12-17 VITALS
HEIGHT: 65 IN | OXYGEN SATURATION: 97 % | TEMPERATURE: 97.1 F | HEART RATE: 88 BPM | DIASTOLIC BLOOD PRESSURE: 60 MMHG | SYSTOLIC BLOOD PRESSURE: 120 MMHG | WEIGHT: 221 LBS | BODY MASS INDEX: 36.82 KG/M2

## 2024-12-17 DIAGNOSIS — R60.0 EDEMA OF LEFT LOWER EXTREMITY: Primary | ICD-10-CM

## 2024-12-17 DIAGNOSIS — Z3A.20 20 WEEKS GESTATION OF PREGNANCY: ICD-10-CM

## 2024-12-17 PROBLEM — O99.810 ABNORMAL MATERNAL GLUCOSE TOLERANCE, ANTEPARTUM: Status: ACTIVE | Noted: 2024-09-25

## 2024-12-17 PROBLEM — O99.210 OBESITY AFFECTING PREGNANCY, ANTEPARTUM: Status: ACTIVE | Noted: 2024-09-03

## 2024-12-17 PROBLEM — Z86.2 HISTORY OF IRON DEFICIENCY ANEMIA: Status: ACTIVE | Noted: 2024-02-26

## 2024-12-17 PROCEDURE — 99214 OFFICE O/P EST MOD 30 MIN: CPT | Performed by: FAMILY MEDICINE

## 2024-12-17 NOTE — ASSESSMENT & PLAN NOTE
Follow-up with the GYN she is on prenatal vitamin  Orders:  •  VAS VENOUS DUPLEX -LOWER LIMB UNILATERAL; Future

## 2024-12-17 NOTE — ASSESSMENT & PLAN NOTE
Acute symptomatic swelling tender and erythema of left lower extremity no history of trauma patient is a pregnant 20-week recommend to do stat venous duplex to rule out DVT we will follow-up with the result accordingly in case get worse to go to the emergency room  Orders:  •  VAS VENOUS DUPLEX -LOWER LIMB UNILATERAL; Future

## 2024-12-17 NOTE — PROGRESS NOTES
"Name: Tran Wilkins      : 1995      MRN: [id number]  Encounter Provider: Laurie Downing MD  Encounter Date: 2024   Encounter department: [facility]      Assessment & Plan  Edema of left lower extremity  Acute symptomatic swelling tender and erythema of left lower extremity no history of trauma patient is a pregnant 20-week recommend to do stat venous duplex to rule out DVT we will follow-up with the result accordingly in case get worse to go to the emergency room  Orders:  •  VAS VENOUS DUPLEX -LOWER LIMB UNILATERAL; Future    20 weeks gestation of pregnancy  Follow-up with the GYN she is on prenatal vitamin  Orders:  •  VAS VENOUS DUPLEX -LOWER LIMB UNILATERAL; Future         History of Present Illness     Patient who is a 20-week pregnant came to the office concerned about 3 days history of swelling redness and pain in her left lower extremity started around the ankle area and spreading to toward her calf no cough no wheezing no hematosis no short of breath no dyspnea on exertion      Review of Systems   Constitutional:  Negative for chills and fever.   HENT:  Negative for ear pain and sore throat.    Eyes:  Negative for pain and visual disturbance.   Respiratory:  Negative for cough and shortness of breath.    Cardiovascular:  Positive for leg swelling. Negative for chest pain and palpitations.   Gastrointestinal:  Negative for abdominal pain and vomiting.   Genitourinary:  Negative for dysuria and hematuria.   Musculoskeletal:  Negative for arthralgias and back pain.   Skin:  Negative for color change and rash.   Neurological:  Negative for seizures and syncope.   All other systems reviewed and are negative.    Objective     /60 (BP Location: Left arm, Patient Position: Sitting)   Pulse 88   Temp (!) 97.1 °F (36.2 °C) (Tympanic)   Ht 5' 5.25\" (1.657 m)   Wt 100 kg (221 lb)   LMP 2024 (Approximate)   SpO2 97%   Breastfeeding No   BMI " 36.50 kg/m²     Physical Exam  Vitals and nursing note reviewed.   Constitutional:       General: She is not in acute distress.     Appearance: She is well-developed. She is not diaphoretic.   HENT:      Head: Normocephalic.      Right Ear: Tympanic membrane and external ear normal.      Left Ear: Tympanic membrane and external ear normal.      Nose: No rhinorrhea.      Mouth/Throat:      Pharynx: No posterior oropharyngeal erythema.   Eyes:      General:         Right eye: No discharge.         Left eye: No discharge.      Conjunctiva/sclera: Conjunctivae normal.   Neck:      Vascular: No JVD.   Cardiovascular:      Rate and Rhythm: Normal rate and regular rhythm.      Heart sounds: Normal heart sounds. No murmur heard.     No gallop.   Pulmonary:      Effort: Pulmonary effort is normal. No respiratory distress.      Breath sounds: Normal breath sounds. No wheezing.   Abdominal:      General: There is no distension.      Palpations: Abdomen is soft.      Tenderness: There is no abdominal tenderness. There is no rebound.   Musculoskeletal:         General: No tenderness.      Cervical back: Normal range of motion and neck supple.   Lymphadenopathy:      Cervical: No cervical adenopathy.   Skin:     General: Skin is warm.      Findings: No rash.          Neurological:      Mental Status: She is alert and oriented to person, place, and time.         Laurie Downing MD

## 2024-12-18 ENCOUNTER — RESULTS FOLLOW-UP (OUTPATIENT)
Dept: FAMILY MEDICINE CLINIC | Facility: CLINIC | Age: 29
End: 2024-12-18

## 2024-12-18 DIAGNOSIS — R60.0 LOWER EXTREMITY EDEMA: ICD-10-CM

## 2024-12-18 DIAGNOSIS — R60.0 EDEMA OF LEFT LOWER EXTREMITY: Primary | ICD-10-CM

## 2024-12-18 NOTE — RESULT ENCOUNTER NOTE
Venous duplex negative for DVT  Recommend to wear compression stocking elevate the leg above the level of the heart if patient symptom persistent to be reevaluated  To send prescription for compression stocking bilateral lower extremity above-the-knee

## 2024-12-18 NOTE — TELEPHONE ENCOUNTER
I let pt know Us was negative for Dvt so that's good. Dr. Downing recommends pt to use compression stockings and if symptoms persist, to give office a call to be evaluated again.

## 2024-12-18 NOTE — TELEPHONE ENCOUNTER
----- Message from Laurie Downing MD sent at 12/18/2024 12:38 PM EST -----  Venous duplex negative for DVT  Recommend to wear compression stocking elevate the leg above the level of the heart if patient symptom persistent to be reevaluated  To send prescription for compression stocking bilateral lower extremity above-the-knee

## 2024-12-24 ENCOUNTER — TELEPHONE (OUTPATIENT)
Dept: FAMILY MEDICINE CLINIC | Facility: CLINIC | Age: 29
End: 2024-12-24

## 2024-12-24 DIAGNOSIS — R20.2 NUMBNESS AND TINGLING IN BOTH HANDS: Primary | ICD-10-CM

## 2024-12-24 DIAGNOSIS — R20.0 NUMBNESS AND TINGLING IN BOTH HANDS: Primary | ICD-10-CM

## 2024-12-24 NOTE — TELEPHONE ENCOUNTER
Lm to ask pt what arm she is having numbness in as I need to place an order for a MSK study. Is she is having numbness in both, we will order a mask study for both hands.

## 2025-01-22 NOTE — TELEPHONE ENCOUNTER
01/22/25 1:40 PM Pt called and LVM with [facility] confirming that she will be going back to work starting on 2/3 and that her off days will be Tuesdays and Thursdays. These would be her preferred INR check days. Next INR is 2/4 on Tuesday therefore will keep date. Pt did not require a call back.    Pt called back made her aware she needs to go for the HCG. Pt verbalized understanding.

## 2025-05-15 ENCOUNTER — TELEPHONE (OUTPATIENT)
Age: 30
End: 2025-05-15

## 2025-05-15 NOTE — TELEPHONE ENCOUNTER
Received transfer from [place].  Patient with right eye redness and itching.  Asking if she should go to urgent care today.  Advised that she would be fine to wait until virtual appointment tomorrow but if she is very uncomfortable and wants to be examined today, she can go to urgent care if she would prefer.  States that she will keep her appointment for tomorrow morning at 10:30.   She should continue to use ibuprofen as needed for pain relief and elevate the leg when Letitia is sitting. She can also try ice - 20 min on and 20 min off a few times daily. If wrapping it helps her with support on her ankle with walking, then that is fine. She does not need to keep it wrapped when she sleeps or if she is seated. It is hard to know if an ankle is broken without seeing it, but I am glad she is able to bear weight. I would say if she does not seem to have improvement over the next couple days, she should be seen to be evaluated for a possible xray.

## 2025-05-16 ENCOUNTER — TELEMEDICINE (OUTPATIENT)
Dept: FAMILY MEDICINE CLINIC | Facility: CLINIC | Age: 30
End: 2025-05-16
Payer: COMMERCIAL

## 2025-05-16 VITALS — SYSTOLIC BLOOD PRESSURE: 120 MMHG | DIASTOLIC BLOOD PRESSURE: 70 MMHG

## 2025-05-16 DIAGNOSIS — H10.31 ACUTE BACTERIAL CONJUNCTIVITIS OF RIGHT EYE: Primary | ICD-10-CM

## 2025-05-16 PROCEDURE — 99213 OFFICE O/P EST LOW 20 MIN: CPT | Performed by: FAMILY MEDICINE

## 2025-05-16 RX ORDER — VITAMIN B COMPLEX
1000 TABLET ORAL DAILY
COMMUNITY

## 2025-05-16 RX ORDER — OFLOXACIN 3 MG/ML
1 SOLUTION/ DROPS OPHTHALMIC 4 TIMES DAILY
Qty: 5 ML | Refills: 0 | Status: SHIPPED | OUTPATIENT
Start: 2025-05-16 | End: 2025-05-23

## 2025-05-16 NOTE — PROGRESS NOTES
Virtual Regular VisitName: Tran Wilkins      : 1995      MRN: [id number]  Encounter Provider: Laurie Downing MD  Encounter Date: 2025   Encounter department: [facility]  :  Assessment & Plan  Acute bacterial conjunctivitis of right eye  New diagnosis acute symptomatic positive sick contact her son had similar problem discussed the patient not sure of the problem recommend to use floxacillin eyedrop every 6 hours for 7 days proper eye care review  Orders:  •  ofloxacin (OCUFLOX) 0.3 % ophthalmic solution; Administer 1 drop to the right eye 4 (four) times a day for 7 days      Assessment & Plan        History of Present Illness     Patient virtual visit concerned about knee red discharge in her right eye she wake up in the morning to having similar symptom no pain with movement of the eyeball no light sensitivity patient son had similar problem he started antibiotic eyedrop by his pediatrician      Review of Systems   Constitutional:  Negative for chills and fever.   HENT:  Negative for ear pain and sore throat.    Eyes:  Positive for discharge and redness. Negative for pain and visual disturbance.   Respiratory:  Negative for cough and shortness of breath.    Cardiovascular:  Negative for chest pain and palpitations.   Gastrointestinal:  Negative for abdominal pain and vomiting.   Genitourinary:  Negative for dysuria and hematuria.   Skin:  Negative for color change and rash.   Neurological:  Negative for seizures and syncope.   All other systems reviewed and are negative.      Objective   /70 (BP Location: Left arm)   LMP 2024 (Approximate)     Physical Exam  Constitutional:       General: She is not in acute distress.     Appearance: She is well-developed. She is not ill-appearing, toxic-appearing or diaphoretic.   HENT:      Head: Normocephalic.      Nose: Nose normal. No congestion or rhinorrhea.      Mouth/Throat:      Mouth: Mucous membranes are moist.       Pharynx: No oropharyngeal exudate or posterior oropharyngeal erythema.     Eyes:      Comments: Right eye erythema and discharge   Neck:      Vascular: No JVD.      Comments: No grossly visible mass Pulmonary:      Effort: Pulmonary effort is normal. No respiratory distress.      Breath sounds: No stridor. No wheezing.   Abdominal:      General: There is no distension.      Comments: Patient state her abdomen and a soft she also state no tender  Patient deny any visible or palpable bulging to suggest hernia  I was able to visualize abdomen and there is no change in the color no distension no visible mass     Musculoskeletal:         General: Normal range of motion.      Cervical back: Normal range of motion and neck supple.     Skin:     Findings: No erythema or rash.     Neurological:      Mental Status: She is alert and oriented to person, place, and time.     Psychiatric:         Mood and Affect: Mood normal.         Administrative Statements   Encounter provider Laurie Downing MD    The Patient is located at Home and in the following state in which I hold an active license [place].    The patient was identified by name and date of birth. Tran Wilkins was informed that this is a telemedicine visit and that the visit is being conducted through the Epic Embedded platform. She agrees to proceed..  My office door was closed. No one else was in the room.  She acknowledged consent and understanding of privacy and security of the video platform. The patient has agreed to participate and understands they can discontinue the visit at any time.    I have spent a total time of 15 minutes in caring for this patient on the day of the visit/encounter including Risks and benefits of tx options, Instructions for management, Importance of tx compliance, and Impressions, not including the time spent for establishing the audio/video connection.

## 2025-05-16 NOTE — ASSESSMENT & PLAN NOTE
New diagnosis acute symptomatic positive sick contact her son had similar problem discussed the patient not sure of the problem recommend to use floxacillin eyedrop every 6 hours for 7 days proper eye care review  Orders:  •  ofloxacin (OCUFLOX) 0.3 % ophthalmic solution; Administer 1 drop to the right eye 4 (four) times a day for 7 days

## 2025-06-15 PROBLEM — H10.31 ACUTE BACTERIAL CONJUNCTIVITIS OF RIGHT EYE: Status: RESOLVED | Noted: 2025-05-16 | Resolved: 2025-06-15

## 2025-06-16 ENCOUNTER — OFFICE VISIT (OUTPATIENT)
Dept: FAMILY MEDICINE CLINIC | Facility: CLINIC | Age: 30
End: 2025-06-16
Payer: COMMERCIAL

## 2025-06-16 VITALS
WEIGHT: 212 LBS | TEMPERATURE: 97.8 F | OXYGEN SATURATION: 96 % | HEART RATE: 86 BPM | SYSTOLIC BLOOD PRESSURE: 112 MMHG | DIASTOLIC BLOOD PRESSURE: 86 MMHG | BODY MASS INDEX: 33.27 KG/M2 | HEIGHT: 67 IN

## 2025-06-16 DIAGNOSIS — R09.81 SINUS CONGESTION: ICD-10-CM

## 2025-06-16 DIAGNOSIS — M65.4 DE QUERVAIN'S DISEASE (RADIAL STYLOID TENOSYNOVITIS): Primary | ICD-10-CM

## 2025-06-16 PROCEDURE — 99214 OFFICE O/P EST MOD 30 MIN: CPT | Performed by: FAMILY MEDICINE

## 2025-06-16 RX ORDER — FLUTICASONE PROPIONATE 50 MCG
2 SPRAY, SUSPENSION (ML) NASAL DAILY
Start: 2025-06-16

## 2025-06-16 NOTE — ASSESSMENT & PLAN NOTE
New diagnosis symptomatic discussed with the patient natural of the problem recommend to wear splint recommend to apply ice 20 minutes 3 times a day and Voltaren gel 2 g 4 times a day  Orders:  •  Diclofenac Sodium (VOLTAREN) 1 %; Apply 2 g topically 4 (four) times a day

## 2025-06-18 NOTE — ASSESSMENT & PLAN NOTE
Symptomatic recommend use of Flonase nasal spray 2 sprays nostril once a day proper use and possible side effects reviewed  Orders:  •  fluticasone (FLONASE) 50 mcg/act nasal spray; 2 sprays into each nostril daily

## 2025-06-18 NOTE — PROGRESS NOTES
Name: Tran Wilkins      : 1995      MRN: [id number]  Encounter Provider: Laurie Downing MD  Encounter Date: 2025   Encounter department: [facility] [place] [facility]  :  Assessment & Plan  De Quervain's disease (radial styloid tenosynovitis)  New diagnosis symptomatic discussed with the patient natural of the problem recommend to wear splint recommend to apply ice 20 minutes 3 times a day and Voltaren gel 2 g 4 times a day  Orders:  •  Diclofenac Sodium (VOLTAREN) 1 %; Apply 2 g topically 4 (four) times a day    Sinus congestion  Symptomatic recommend use of Flonase nasal spray 2 sprays nostril once a day proper use and possible side effects reviewed  Orders:  •  fluticasone (FLONASE) 50 mcg/act nasal spray; 2 sprays into each nostril daily      Assessment & Plan           History of Present Illness   Patient today concerned about the pain on her right hand mostly at the base of the thumb radiate to the forearm certain range of motion aggravate the pain associated with some swelling no change in the color of the skin no rash patient had this 7-month-old baby and she will hold him most of the time and she do repetitive movement with her hand no numbness no muscle weakness no drop object also patient concerned about nasal congestion postnasal drip no cough wheezing no short of breath      Review of Systems   Constitutional:  Negative for activity change, appetite change, fatigue and fever.   HENT:  Positive for congestion. Negative for ear pain, sinus pressure, sinus pain and sore throat.    Eyes:  Negative for discharge and redness.   Respiratory:  Negative for cough, chest tightness and shortness of breath.    Cardiovascular:  Negative for chest pain, palpitations and leg swelling.   Gastrointestinal:  Negative for abdominal pain, constipation and diarrhea.   Genitourinary:  Negative for dysuria, flank pain, frequency and hematuria.   Musculoskeletal:  Positive for arthralgias. Negative for  "gait problem.   Skin:  Negative for pallor and rash.   Neurological:  Negative for dizziness, tremors, weakness, numbness and headaches.   Hematological:  Does not bruise/bleed easily.       Objective   /86   Pulse 86   Temp 97.8 °F (36.6 °C) (Tympanic)   Ht 5' 6.93\" (1.7 m)   Wt 96.2 kg (212 lb)   LMP 07/24/2024 (Approximate)   SpO2 96%   Breastfeeding Yes   BMI 33.27 kg/m²      Physical Exam  Vitals and nursing note reviewed.   Constitutional:       General: She is not in acute distress.     Appearance: She is well-developed. She is not diaphoretic.   HENT:      Head: Normocephalic.      Right Ear: Tympanic membrane and external ear normal.      Left Ear: Tympanic membrane and external ear normal.      Nose: No rhinorrhea.      Mouth/Throat:      Pharynx: No posterior oropharyngeal erythema.     Eyes:      General:         Right eye: No discharge.         Left eye: No discharge.      Conjunctiva/sclera: Conjunctivae normal.     Neck:      Vascular: No JVD.     Cardiovascular:      Rate and Rhythm: Normal rate and regular rhythm.      Heart sounds: Normal heart sounds. No murmur heard.     No gallop.   Pulmonary:      Effort: Pulmonary effort is normal. No respiratory distress.      Breath sounds: Normal breath sounds. No wheezing.   Abdominal:      General: There is no distension.      Palpations: Abdomen is soft.      Tenderness: There is no abdominal tenderness. There is no rebound.     Musculoskeletal:         General: No tenderness.        Arms:       Cervical back: Normal range of motion and neck supple.   Lymphadenopathy:      Cervical: No cervical adenopathy.     Skin:     General: Skin is warm.      Findings: No rash.     Neurological:      Mental Status: She is alert and oriented to person, place, and time.         "

## 2025-08-18 ENCOUNTER — OFFICE VISIT (OUTPATIENT)
Dept: FAMILY MEDICINE CLINIC | Facility: CLINIC | Age: 30
End: 2025-08-18
Payer: MEDICARE

## 2025-08-18 ENCOUNTER — APPOINTMENT (OUTPATIENT)
Dept: LAB | Facility: CLINIC | Age: 30
End: 2025-08-18
Payer: MEDICARE

## 2025-08-18 VITALS
OXYGEN SATURATION: 98 % | TEMPERATURE: 97.7 F | SYSTOLIC BLOOD PRESSURE: 108 MMHG | HEART RATE: 81 BPM | BODY MASS INDEX: 35.49 KG/M2 | HEIGHT: 65 IN | WEIGHT: 213 LBS | DIASTOLIC BLOOD PRESSURE: 70 MMHG

## 2025-08-18 DIAGNOSIS — E61.1 IRON DEFICIENCY: ICD-10-CM

## 2025-08-18 DIAGNOSIS — L65.9 HAIR LOSS: ICD-10-CM

## 2025-08-18 DIAGNOSIS — Z00.01 ENCOUNTER FOR WELL ADULT EXAM WITH ABNORMAL FINDINGS: Primary | ICD-10-CM

## 2025-08-18 DIAGNOSIS — M65.4 DE QUERVAIN'S DISEASE (RADIAL STYLOID TENOSYNOVITIS): ICD-10-CM

## 2025-08-18 DIAGNOSIS — E66.812 CLASS 2 OBESITY DUE TO EXCESS CALORIES WITHOUT SERIOUS COMORBIDITY WITH BODY MASS INDEX (BMI) OF 35.0 TO 35.9 IN ADULT: ICD-10-CM

## 2025-08-18 DIAGNOSIS — E66.09 CLASS 2 OBESITY DUE TO EXCESS CALORIES WITHOUT SERIOUS COMORBIDITY WITH BODY MASS INDEX (BMI) OF 35.0 TO 35.9 IN ADULT: ICD-10-CM

## 2025-08-18 PROBLEM — Z3A.20 20 WEEKS GESTATION OF PREGNANCY: Status: RESOLVED | Noted: 2024-12-17 | Resolved: 2025-08-18

## 2025-08-18 LAB
25(OH)D3 SERPL-MCNC: 39.4 NG/ML (ref 30–100)
ALBUMIN SERPL BCG-MCNC: 4.3 G/DL (ref 3.5–5)
ALP SERPL-CCNC: 63 U/L (ref 34–104)
ALT SERPL W P-5'-P-CCNC: 27 U/L (ref 7–52)
ANION GAP SERPL CALCULATED.3IONS-SCNC: 11 MMOL/L (ref 4–13)
AST SERPL W P-5'-P-CCNC: 18 U/L (ref 13–39)
BASOPHILS # BLD AUTO: 0.03 THOUSANDS/ÂΜL (ref 0–0.1)
BASOPHILS NFR BLD AUTO: 0 % (ref 0–1)
BILIRUB SERPL-MCNC: 0.51 MG/DL (ref 0.2–1)
BUN SERPL-MCNC: 13 MG/DL (ref 5–25)
CALCIUM SERPL-MCNC: 9.1 MG/DL (ref 8.4–10.2)
CHLORIDE SERPL-SCNC: 103 MMOL/L (ref 96–108)
CHOLEST SERPL-MCNC: 186 MG/DL (ref ?–200)
CO2 SERPL-SCNC: 25 MMOL/L (ref 21–32)
CREAT SERPL-MCNC: 0.58 MG/DL (ref 0.6–1.3)
EOSINOPHIL # BLD AUTO: 0.15 THOUSAND/ÂΜL (ref 0–0.61)
EOSINOPHIL NFR BLD AUTO: 2 % (ref 0–6)
ERYTHROCYTE [DISTWIDTH] IN BLOOD BY AUTOMATED COUNT: 12.9 % (ref 11.6–15.1)
FERRITIN SERPL-MCNC: 113 NG/ML (ref 30–307)
GFR SERPL CREATININE-BSD FRML MDRD: 124 ML/MIN/1.73SQ M
GLUCOSE P FAST SERPL-MCNC: 71 MG/DL (ref 65–99)
HCT VFR BLD AUTO: 40 % (ref 34.8–46.1)
HDLC SERPL-MCNC: 57 MG/DL
HGB BLD-MCNC: 13 G/DL (ref 11.5–15.4)
IMM GRANULOCYTES # BLD AUTO: 0.02 THOUSAND/UL (ref 0–0.2)
IMM GRANULOCYTES NFR BLD AUTO: 0 % (ref 0–2)
IRON SERPL-MCNC: 130 UG/DL (ref 50–212)
LDLC SERPL CALC-MCNC: 109 MG/DL (ref 0–100)
LYMPHOCYTES # BLD AUTO: 2.11 THOUSANDS/ÂΜL (ref 0.6–4.47)
LYMPHOCYTES NFR BLD AUTO: 27 % (ref 14–44)
MCH RBC QN AUTO: 26.8 PG (ref 26.8–34.3)
MCHC RBC AUTO-ENTMCNC: 32.5 G/DL (ref 31.4–37.4)
MCV RBC AUTO: 83 FL (ref 82–98)
MONOCYTES # BLD AUTO: 0.63 THOUSAND/ÂΜL (ref 0.17–1.22)
MONOCYTES NFR BLD AUTO: 8 % (ref 4–12)
NEUTROPHILS # BLD AUTO: 4.96 THOUSANDS/ÂΜL (ref 1.85–7.62)
NEUTS SEG NFR BLD AUTO: 63 % (ref 43–75)
NONHDLC SERPL-MCNC: 129 MG/DL
NRBC BLD AUTO-RTO: 0 /100 WBCS
PLATELET # BLD AUTO: 289 THOUSANDS/UL (ref 149–390)
PMV BLD AUTO: 10.4 FL (ref 8.9–12.7)
POTASSIUM SERPL-SCNC: 3.9 MMOL/L (ref 3.5–5.3)
PROT SERPL-MCNC: 7.2 G/DL (ref 6.4–8.4)
RBC # BLD AUTO: 4.85 MILLION/UL (ref 3.81–5.12)
SODIUM SERPL-SCNC: 139 MMOL/L (ref 135–147)
TRIGL SERPL-MCNC: 101 MG/DL (ref ?–150)
TSH SERPL DL<=0.05 MIU/L-ACNC: 1.94 UIU/ML (ref 0.45–4.5)
VIT B12 SERPL-MCNC: 218 PG/ML (ref 180–914)
WBC # BLD AUTO: 7.9 THOUSAND/UL (ref 4.31–10.16)

## 2025-08-18 PROCEDURE — 99395 PREV VISIT EST AGE 18-39: CPT | Performed by: FAMILY MEDICINE

## 2025-08-18 PROCEDURE — 36415 COLL VENOUS BLD VENIPUNCTURE: CPT

## 2025-08-18 PROCEDURE — 82306 VITAMIN D 25 HYDROXY: CPT

## 2025-08-18 PROCEDURE — 82607 VITAMIN B-12: CPT

## 2025-08-18 PROCEDURE — 80061 LIPID PANEL: CPT

## 2025-08-18 PROCEDURE — 80053 COMPREHEN METABOLIC PANEL: CPT

## 2025-08-18 PROCEDURE — 83540 ASSAY OF IRON: CPT

## 2025-08-18 PROCEDURE — 99214 OFFICE O/P EST MOD 30 MIN: CPT | Performed by: FAMILY MEDICINE

## 2025-08-18 PROCEDURE — 86038 ANTINUCLEAR ANTIBODIES: CPT

## 2025-08-18 PROCEDURE — 85025 COMPLETE CBC W/AUTO DIFF WBC: CPT

## 2025-08-18 PROCEDURE — 82728 ASSAY OF FERRITIN: CPT

## 2025-08-18 PROCEDURE — 84443 ASSAY THYROID STIM HORMONE: CPT

## 2025-08-18 PROCEDURE — 86225 DNA ANTIBODY NATIVE: CPT

## 2025-08-18 RX ORDER — METHYLPREDNISOLONE 4 MG/1
TABLET ORAL
Qty: 21 EACH | Refills: 0 | Status: SHIPPED | OUTPATIENT
Start: 2025-08-18

## 2025-08-20 LAB
DSDNA IGG SERPL IA-ACNC: 1.1 IU/ML (ref ?–15)
NUCLEAR IGG SER IA-RTO: <0.09 RATIO (ref ?–1)

## (undated) DEVICE — ADHESIVE SKN CLSR HISTOACRYL FLEX 0.5ML LF

## (undated) DEVICE — UNIVERSAL GYN LAPAROSCOPY,KIT: Brand: CARDINAL HEALTH

## (undated) DEVICE — GLOVE SRG BIOGEL 7

## (undated) DEVICE — [HIGH FLOW INSUFFLATOR,  DO NOT USE IF PACKAGE IS DAMAGED,  KEEP DRY,  KEEP AWAY FROM SUNLIGHT,  PROTECT FROM HEAT AND RADIOACTIVE SOURCES.]: Brand: PNEUMOSURE

## (undated) DEVICE — SYRINGE 50ML LL

## (undated) DEVICE — PREMIUM DRY TRAY LF: Brand: MEDLINE INDUSTRIES, INC.

## (undated) DEVICE — GLOVE INDICATOR PI UNDERGLOVE SZ 7.5 BLUE

## (undated) DEVICE — CHLORAPREP HI-LITE 26ML ORANGE

## (undated) DEVICE — SUT VICRYL 0 UR-6 27 IN J603H

## (undated) DEVICE — ENDOPATH XCEL BLADELESS TROCARS WITH STABILITY SLEEVES: Brand: ENDOPATH XCEL

## (undated) DEVICE — ENDOPOUCH RETRIEVER SPECIMEN RETRIEVAL BAGS: Brand: ENDOPOUCH RETRIEVER

## (undated) DEVICE — PLASTIC ADHESIVE BANDAGE: Brand: CURITY

## (undated) DEVICE — BANDAID SHEER SPOT

## (undated) DEVICE — HYDROPHILIC WOUND DRESSING WITH ZINC PLUS VITAMINS A AND B6.: Brand: DERMAGRAN®-B

## (undated) DEVICE — INTERCEED

## (undated) DEVICE — SUT MONOCRYL 4-0 PS-2 27 IN Y426H

## (undated) DEVICE — ENDOPATH PNEUMONEEDLE INSUFFLATION NEEDLES WITH LUER LOCK CONNECTORS 120MM: Brand: ENDOPATH

## (undated) DEVICE — BLUE HEAT SCOPE WARMER

## (undated) DEVICE — IRRIG ENDO FLO TUBING

## (undated) DEVICE — INTENDED FOR TISSUE SEPARATION, AND OTHER PROCEDURES THAT REQUIRE A SHARP SURGICAL BLADE TO PUNCTURE OR CUT.: Brand: BARD-PARKER SAFETY BLADES SIZE 11, STERILE

## (undated) DEVICE — ENDOPATH XCEL UNIVERSAL TROCAR STABLILITY SLEEVES: Brand: ENDOPATH XCEL